# Patient Record
Sex: MALE | Race: WHITE | NOT HISPANIC OR LATINO | URBAN - METROPOLITAN AREA
[De-identification: names, ages, dates, MRNs, and addresses within clinical notes are randomized per-mention and may not be internally consistent; named-entity substitution may affect disease eponyms.]

---

## 2019-12-23 PROBLEM — Z00.00 ENCOUNTER FOR PREVENTIVE HEALTH EXAMINATION: Status: ACTIVE | Noted: 2019-12-23

## 2021-08-31 ENCOUNTER — OUTPATIENT (OUTPATIENT)
Dept: OUTPATIENT SERVICES | Facility: HOSPITAL | Age: 61
LOS: 1 days | Discharge: HOME | End: 2021-08-31
Payer: MEDICARE

## 2021-08-31 ENCOUNTER — RESULT REVIEW (OUTPATIENT)
Age: 61
End: 2021-08-31

## 2021-08-31 VITALS
DIASTOLIC BLOOD PRESSURE: 86 MMHG | HEART RATE: 75 BPM | RESPIRATION RATE: 16 BRPM | SYSTOLIC BLOOD PRESSURE: 123 MMHG | WEIGHT: 189.82 LBS | TEMPERATURE: 99 F | OXYGEN SATURATION: 97 % | HEIGHT: 70 IN

## 2021-08-31 DIAGNOSIS — M16.12 UNILATERAL PRIMARY OSTEOARTHRITIS, LEFT HIP: ICD-10-CM

## 2021-08-31 DIAGNOSIS — Z01.818 ENCOUNTER FOR OTHER PREPROCEDURAL EXAMINATION: ICD-10-CM

## 2021-08-31 DIAGNOSIS — Z98.890 OTHER SPECIFIED POSTPROCEDURAL STATES: Chronic | ICD-10-CM

## 2021-08-31 DIAGNOSIS — Z87.19 PERSONAL HISTORY OF OTHER DISEASES OF THE DIGESTIVE SYSTEM: Chronic | ICD-10-CM

## 2021-08-31 LAB
A1C WITH ESTIMATED AVERAGE GLUCOSE RESULT: 5.7 % — HIGH (ref 4–5.6)
ALBUMIN SERPL ELPH-MCNC: 4.4 G/DL — SIGNIFICANT CHANGE UP (ref 3.5–5.2)
ALP SERPL-CCNC: 86 U/L — SIGNIFICANT CHANGE UP (ref 30–115)
ALT FLD-CCNC: 26 U/L — SIGNIFICANT CHANGE UP (ref 0–41)
ANION GAP SERPL CALC-SCNC: 10 MMOL/L — SIGNIFICANT CHANGE UP (ref 7–14)
APTT BLD: 33.5 SEC — SIGNIFICANT CHANGE UP (ref 27–39.2)
AST SERPL-CCNC: 18 U/L — SIGNIFICANT CHANGE UP (ref 0–41)
BASOPHILS # BLD AUTO: 0.05 K/UL — SIGNIFICANT CHANGE UP (ref 0–0.2)
BASOPHILS NFR BLD AUTO: 1 % — SIGNIFICANT CHANGE UP (ref 0–1)
BILIRUB SERPL-MCNC: 0.7 MG/DL — SIGNIFICANT CHANGE UP (ref 0.2–1.2)
BLD GP AB SCN SERPL QL: SIGNIFICANT CHANGE UP
BUN SERPL-MCNC: 12 MG/DL — SIGNIFICANT CHANGE UP (ref 10–20)
CALCIUM SERPL-MCNC: 9.3 MG/DL — SIGNIFICANT CHANGE UP (ref 8.5–10.1)
CHLORIDE SERPL-SCNC: 106 MMOL/L — SIGNIFICANT CHANGE UP (ref 98–110)
CO2 SERPL-SCNC: 25 MMOL/L — SIGNIFICANT CHANGE UP (ref 17–32)
CREAT SERPL-MCNC: 0.9 MG/DL — SIGNIFICANT CHANGE UP (ref 0.7–1.5)
EOSINOPHIL # BLD AUTO: 0.25 K/UL — SIGNIFICANT CHANGE UP (ref 0–0.7)
EOSINOPHIL NFR BLD AUTO: 5 % — SIGNIFICANT CHANGE UP (ref 0–8)
ESTIMATED AVERAGE GLUCOSE: 117 MG/DL — HIGH (ref 68–114)
GLUCOSE SERPL-MCNC: 103 MG/DL — HIGH (ref 70–99)
HCT VFR BLD CALC: 45.5 % — SIGNIFICANT CHANGE UP (ref 42–52)
HGB BLD-MCNC: 15.6 G/DL — SIGNIFICANT CHANGE UP (ref 14–18)
IMM GRANULOCYTES NFR BLD AUTO: 0.2 % — SIGNIFICANT CHANGE UP (ref 0.1–0.3)
INR BLD: 0.96 RATIO — SIGNIFICANT CHANGE UP (ref 0.65–1.3)
LYMPHOCYTES # BLD AUTO: 1.81 K/UL — SIGNIFICANT CHANGE UP (ref 1.2–3.4)
LYMPHOCYTES # BLD AUTO: 35.8 % — SIGNIFICANT CHANGE UP (ref 20.5–51.1)
MCHC RBC-ENTMCNC: 32.2 PG — HIGH (ref 27–31)
MCHC RBC-ENTMCNC: 34.3 G/DL — SIGNIFICANT CHANGE UP (ref 32–37)
MCV RBC AUTO: 93.8 FL — SIGNIFICANT CHANGE UP (ref 80–94)
MONOCYTES # BLD AUTO: 0.61 K/UL — HIGH (ref 0.1–0.6)
MONOCYTES NFR BLD AUTO: 12.1 % — HIGH (ref 1.7–9.3)
MRSA PCR RESULT.: NEGATIVE — SIGNIFICANT CHANGE UP
NEUTROPHILS # BLD AUTO: 2.32 K/UL — SIGNIFICANT CHANGE UP (ref 1.4–6.5)
NEUTROPHILS NFR BLD AUTO: 45.9 % — SIGNIFICANT CHANGE UP (ref 42.2–75.2)
NRBC # BLD: 0 /100 WBCS — SIGNIFICANT CHANGE UP (ref 0–0)
PLATELET # BLD AUTO: 266 K/UL — SIGNIFICANT CHANGE UP (ref 130–400)
POTASSIUM SERPL-MCNC: 4.1 MMOL/L — SIGNIFICANT CHANGE UP (ref 3.5–5)
POTASSIUM SERPL-SCNC: 4.1 MMOL/L — SIGNIFICANT CHANGE UP (ref 3.5–5)
PROT SERPL-MCNC: 6.9 G/DL — SIGNIFICANT CHANGE UP (ref 6–8)
PROTHROM AB SERPL-ACNC: 11.1 SEC — SIGNIFICANT CHANGE UP (ref 9.95–12.87)
RBC # BLD: 4.85 M/UL — SIGNIFICANT CHANGE UP (ref 4.7–6.1)
RBC # FLD: 12.8 % — SIGNIFICANT CHANGE UP (ref 11.5–14.5)
SODIUM SERPL-SCNC: 141 MMOL/L — SIGNIFICANT CHANGE UP (ref 135–146)
WBC # BLD: 5.05 K/UL — SIGNIFICANT CHANGE UP (ref 4.8–10.8)
WBC # FLD AUTO: 5.05 K/UL — SIGNIFICANT CHANGE UP (ref 4.8–10.8)

## 2021-08-31 PROCEDURE — 71046 X-RAY EXAM CHEST 2 VIEWS: CPT | Mod: 26

## 2021-08-31 PROCEDURE — 93010 ELECTROCARDIOGRAM REPORT: CPT

## 2021-08-31 PROCEDURE — 73502 X-RAY EXAM HIP UNI 2-3 VIEWS: CPT | Mod: 26,LT

## 2021-08-31 NOTE — H&P PST ADULT - HISTORY OF PRESENT ILLNESS
PT PRESENTS TO PAST WITH NO SOB, CP, PALPITATIONS, DYSURIA, UTI OR URI AT PRESENT.   PT ABLE TO WALK UP 2-3 FLIGHTS OF STEPS WITH NO SOB.  AS PER THE PT, THIS IS HIS/HER COMPLETE MEDICAL AND SURGICAL HX, INCLUDING MEDICATIONS PRESCRIBED AND OVER THE COUNTER  pt denies any covid s/s, or tested positive in the past- PT AWARE OF COVID TEST ON 9/12 PRIOR TO PROCEDURE.   pt advised self quarantine till day of procedure  PT TRAVELED TO [place] the [place] in the past 30 days  Anesthesia Alert  NO--Difficult Airway  NO--History of neck surgery or radiation  NO--Limited ROM of neck  NO--History of Malignant hyperthermia  NO--Personal or family history of Pseudocholinesterase deficiency  NO--Prior Anesthesia Complication  NO--Latex Allergy  NO--Loose teeth  NO--History of Rheumatoid Arthritis  NO--STEFANY  NO BLEEDING RISK  NO--Other_____

## 2021-08-31 NOTE — H&P PST ADULT - REASON FOR ADMISSION
Patient is a   61   year old   male presenting to PAST in preparation for  left - thr   on    9/15/21  under    regional   anesthesia by Dr. CARVALHO .  pr reports- I have oa in my left hip FOR SEVERAL YEARS.  THE PAIN HAS INCREASED A LOT OVER THE LAST FEW MONTHS.  THE PAIN IS A 9 OUT OF 10.  ITS A BURNING, SHARP AND THROBBING PAIN.  PT POINTS TO HIS LEFT HIP AND AROUND HIS BACK AS HE DESCRIBES WHERE THE PAIN TRAVELS.   NOTHING REALLY TAKES THE PAIN AWAY.

## 2021-08-31 NOTE — H&P PST ADULT - NSICDXPASTSURGICALHX_GEN_ALL_CORE_FT
PAST SURGICAL HISTORY:  H/O hemorrhoids     H/O hernia repair     History of open reduction and internal fixation (ORIF) procedure RIGHT LEG

## 2021-08-31 NOTE — H&P PST ADULT - NSICDXPASTMEDICALHX_GEN_ALL_CORE_FT
PAST MEDICAL HISTORY:  Backache     Depression PT DENIES SUICIDAL IDEATION    HTN (hypertension)     Hypercholesterolemia     OA (osteoarthritis)

## 2021-09-14 ENCOUNTER — FORM ENCOUNTER (OUTPATIENT)
Age: 61
End: 2021-09-14

## 2021-09-15 ENCOUNTER — NON-APPOINTMENT (OUTPATIENT)
Age: 61
End: 2021-09-15

## 2021-09-15 PROBLEM — M54.9 DORSALGIA, UNSPECIFIED: Chronic | Status: ACTIVE | Noted: 2021-08-31

## 2021-09-15 PROBLEM — F32.9 MAJOR DEPRESSIVE DISORDER, SINGLE EPISODE, UNSPECIFIED: Chronic | Status: ACTIVE | Noted: 2021-08-31

## 2021-09-15 PROBLEM — I10 ESSENTIAL (PRIMARY) HYPERTENSION: Chronic | Status: ACTIVE | Noted: 2021-08-31

## 2021-09-15 PROBLEM — M19.90 UNSPECIFIED OSTEOARTHRITIS, UNSPECIFIED SITE: Chronic | Status: ACTIVE | Noted: 2021-08-31

## 2021-09-15 PROBLEM — E78.00 PURE HYPERCHOLESTEROLEMIA, UNSPECIFIED: Chronic | Status: ACTIVE | Noted: 2021-08-31

## 2021-11-03 ENCOUNTER — APPOINTMENT (OUTPATIENT)
Dept: ORTHOPEDIC SURGERY | Facility: CLINIC | Age: 61
End: 2021-11-03
Payer: MEDICARE

## 2021-11-03 ENCOUNTER — NON-APPOINTMENT (OUTPATIENT)
Age: 61
End: 2021-11-03

## 2021-11-03 VITALS — TEMPERATURE: 97.9 F

## 2021-11-03 DIAGNOSIS — Z86.79 PERSONAL HISTORY OF OTHER DISEASES OF THE CIRCULATORY SYSTEM: ICD-10-CM

## 2021-11-03 DIAGNOSIS — Z86.59 PERSONAL HISTORY OF OTHER MENTAL AND BEHAVIORAL DISORDERS: ICD-10-CM

## 2021-11-03 PROCEDURE — 99204 OFFICE O/P NEW MOD 45 MIN: CPT

## 2021-11-03 RX ORDER — METOPROLOL SUCCINATE 100 MG/1
100 TABLET, EXTENDED RELEASE ORAL
Refills: 0 | Status: ACTIVE | COMMUNITY

## 2021-11-03 RX ORDER — IBUPROFEN 800 MG/1
800 TABLET, FILM COATED ORAL
Refills: 0 | Status: ACTIVE | COMMUNITY

## 2021-11-03 RX ORDER — LORAZEPAM 0.5 MG/1
0.5 TABLET ORAL
Refills: 0 | Status: ACTIVE | COMMUNITY

## 2021-11-03 RX ORDER — DILTIAZEM HYDROCHLORIDE 120 MG/1
120 TABLET ORAL
Refills: 0 | Status: ACTIVE | COMMUNITY

## 2021-11-03 RX ORDER — GABAPENTIN 300 MG
300 TABLET ORAL
Refills: 0 | Status: ACTIVE | COMMUNITY

## 2021-11-03 RX ORDER — ZOLPIDEM TARTRATE 10 MG/1
10 TABLET ORAL
Refills: 0 | Status: ACTIVE | COMMUNITY

## 2021-11-03 RX ORDER — VENLAFAXINE HYDROCHLORIDE 37.5 MG/1
37.5 CAPSULE, EXTENDED RELEASE ORAL
Refills: 0 | Status: ACTIVE | COMMUNITY

## 2021-11-03 RX ORDER — LORAZEPAM 2 MG/1
2 TABLET ORAL
Refills: 0 | Status: ACTIVE | COMMUNITY

## 2021-11-03 RX ORDER — MELOXICAM 15 MG/1
15 TABLET ORAL DAILY
Qty: 30 | Refills: 0 | Status: COMPLETED | COMMUNITY
Start: 2021-11-03 | End: 2021-12-03

## 2021-11-19 ENCOUNTER — APPOINTMENT (OUTPATIENT)
Dept: ORTHOPEDIC SURGERY | Facility: CLINIC | Age: 61
End: 2021-11-19
Payer: MEDICARE

## 2021-11-19 DIAGNOSIS — Q65.89 OTHER SPECIFIED CONGENITAL DEFORMITIES OF HIP: ICD-10-CM

## 2021-11-19 PROCEDURE — 20610 DRAIN/INJ JOINT/BURSA W/O US: CPT | Mod: LT

## 2021-11-19 RX ORDER — TRIAMCINOLONE ACETONIDE 40 MG/ML
40 SUSPENSION INTRA-ARTERIAL; INTRAMUSCULAR
Qty: 1 | Refills: 0 | Status: COMPLETED | OUTPATIENT
Start: 2021-11-19

## 2021-11-19 RX ADMIN — TRIAMCINOLONE ACETONIDE 1 MG/ML: 40 INJECTION, SUSPENSION INTRA-ARTICULAR; INTRAMUSCULAR at 00:00

## 2021-11-19 NOTE — PHYSICAL EXAM
[de-identified] : Left hip\par Inspection: No swelling or ecchymosis.\par Wounds: none.\par Palpation: tender over the greater trochanter \par Stability: no instability.\par Strength: 5/5 all motor groups.\par ROM: Flexion to 80, ER 15, IR 10 with complaints of lateral hip pain, no groin pain. Not tender to palpation over greater trochanter. ABD 30\par Leg length: equal.\par  [de-identified] : Radiographs done outside 08/2021 at [facility] AP pelvis and lateral of hips shows dysplasia of the left hip with well maintained joint spaces.

## 2021-11-19 NOTE — ASSESSMENT
[FreeTextEntry1] : 11/3/2021 - 61 year old male presents to the office with complaints of left lateral hip pain. No complaints of groin pain. He has difficulty with ambulation, putting on shoes and socks and negotiating stairs. He takes ibuprofen PRN for pain. He has had PT without relief. His clinical picture is confusing. His radiographs do not show severe arthritis and his symptoms are limited to lateral hip. We will start with treating him with Mobic. Depending on his clinical pic at that time we might send him for a diagnostic hip injections. \par \par 11/19/2021 - Pt presents for a follow up after taking a course of Mobic. He did get moderate pain relief. He did admit that he does get occasional groin pain which is primarily negotiating stairs. But the majority of his pain is lateral. I reexamined him and ranging left hip only created lateral hip pain. He was also tender to palpation over posterior lateral trochanter. He has already tried PT without relief. We therefore injected his painful trochanteric bursa with lidocaine and kenalog. He will keep a log over the  next few hours about how he feels, continue to Mobic and f/u in 2-3 weeks. \par \par Discussed at length with the patient the planned steroid and lidocaine injection. The risks, benefits, convalescence and alternatives were reviewed. The possible side effects discussed included but were not limited to: pain, swelling, heat and redness. There symptoms are generally mild but if they are extensive then contact the office. Giving pain relievers by mouth such as NSAID’s or Tylenol can generally treat the reactions to steroid and lidocaine. Rare cases of infection have been noted. Rash, hives and itching may occur post injection. If you have muscle pain or cramps, flushing and or swelling of the face, rapid heart beat, nausea, dizziness, fever, chills, headache, difficulty breathing, swelling in the arms or legs, or have a prickly feeling of your skin, contact a health care provider immediately.\par \par Following this discussion, left lateral thigh was prepped with Betadine and under sterile conditions 4 cc of 1% lidocaine and 1 ml Kenalog were injected with a 21 gauge needle. The needle was introduced into the joint, aspiration was performed to ensure intra-articular placement and the medication was injected. Upon withdrawal of the needle the site was cleaned with alcohol and a Band-Aid applied. The patient tolerated the injection well and there were no adverse effects. Post injection instructions included no strenuous activity for 24 hours, cryotherapy and if there are any adverse effects to contact the office.

## 2021-11-19 NOTE — ASSESSMENT
[FreeTextEntry1] : 61 year old male presents to the office with complaints of left lateral hip pain. No complaints of groin pain. He has difficulty with ambulation, putting on shoes and socks and negotiating stairs. He takes ibuprofen PRN for pain. He has had PT without relief. His clinical picture is confusing. His radiographs do not show severe arthritis and his symptoms are limited to lateral hip. We will start with treating him with Mobic. Depending on his clinical pic at that time we might send him for a diagnostic hip injections.

## 2021-11-19 NOTE — HISTORY OF PRESENT ILLNESS
[de-identified] : 11/3/2021 - 61 year old male presents to the office today complaining of left hip pain for more than one year that is worsening. Patient reports pain that is sharp in nature. He states he was scheduled for a total hip replacement with another orthopedist but was canceled due to not getting his COVID test. He states this was the fault of the office and he knew nothing about the test. Patient reports clicking and a loss of motion. He uses a cane for long walks but states he is mostly only able to ambulate about one block at a time. Patient reports pain and difficulty with negotiating stairs. There is also difficulty with putting socks and shoes on. Patient reports taking Ibuprofen 800 mg leftover from a previous femur fracture repair and taking Gabapentin given by his PCP with only minimal relief. Patient has done physical therapy for his left hip in the past without any relief. Patient is here today to discuss his next options for pain relief. \par \par 11/19/2021 - 61 year old male presents to the office today for a follow up on his painful left hip. Patient was started on Meloxicam when we last saw him but reports only mild relief. He continues to have pain located mostly at the lateral hip and in the groin. Patient continues to have to ambulate using a cane for long walks. Patient is here today to discuss his next options for pain relief.

## 2021-11-19 NOTE — HISTORY OF PRESENT ILLNESS
[de-identified] : 61 year old male presents to the office today complaining of left hip pain for more than one year that is worsening. Patient reports pain that is sharp in nature. He states he was scheduled for a total hip replacement with another orthopedist but was canceled due to not getting his COVID test. He states this was the fault of the office and he knew nothing about the test. Patient reports clicking and a loss of motion. He uses a cane for long walks but states he is mostly only able to ambulate about one block at a time. Patient reports pain and difficulty with negotiating stairs. There is also difficulty with putting socks and shoes on. Patient reports taking Ibuprofen 800 mg leftover from a previous femur fracture repair and taking Gabapentin given by his PCP with only minimal relief. Patient has done physical therapy for his left hip in the past without any relief. Patient is here today to discuss his next options for pain relief.

## 2021-11-19 NOTE — PHYSICAL EXAM
[de-identified] : General appearance: well nourished and hydrated, pleasant, alert and oriented x 3, cooperative.\par Cardiovascular: no apparent abnormalities, no lower leg edema, no varicosities, pedal pulses are palpable.\par Neurologic: sensation is normal, no muscle weakness in upper or lower extremities\par Dermatologic no apparent skin lesions, moist, warm, no rash.\par Gait: nonantalgic.\par \par Left knee\par Inspection: no effusion or erythema.\par Wounds: none.\par Alignment: normal.\par Palpation: no specific tenderness on palpation.\par ROM: 0-120 degrees \par Ligamentous laxity: all ligaments appear stable\par Meniscal Test: negative McMurrays\par Muscle Test: good quad strength.\par \par Right knee\par Inspection: no effusion or erythema.\par Wounds: none.\par Alignment: normal.\par Palpation: no specific tenderness on palpation.\par ROM: 0-120 degrees \par Ligamentous laxity: all ligaments appear stable\par Meniscal Test: negative McMurrays.\par Muscle Test: good quad strength.\par \par Left hip\par Inspection: No swelling or ecchymosis.\par Wounds: none.\par Palpation: non-tender.\par Stability: no instability.\par Strength: 5/5 all motor groups.\par ROM: Flexion to 80, ER 15, IR 10 with complaints of lateral hip pain, no groin pain. Not tender to palpation over greater trochanter. ABD 30\par Leg length: equal.\par \par Right hip\par Inspection: No swelling or ecchymosis.\par Wounds: none.\par Palpation: non-tender.\par Stability: no instability.\par Strength: 5/5 all motor groups.\par ROM: no pain with FROM.\par Leg length: equal. [de-identified] : Radiographs done outside AP pelvis and lateral of hips shows dysplasia of the left hip with well maintained joint spaces.

## 2021-11-30 ENCOUNTER — RX RENEWAL (OUTPATIENT)
Age: 61
End: 2021-11-30

## 2021-12-10 ENCOUNTER — APPOINTMENT (OUTPATIENT)
Dept: ORTHOPEDIC SURGERY | Facility: CLINIC | Age: 61
End: 2021-12-10
Payer: MEDICARE

## 2021-12-10 DIAGNOSIS — M25.552 PAIN IN LEFT HIP: ICD-10-CM

## 2021-12-10 PROCEDURE — 99213 OFFICE O/P EST LOW 20 MIN: CPT

## 2021-12-10 NOTE — ASSESSMENT
[FreeTextEntry1] : 11/3/2021 - 61 year old male presents to the office with complaints of left lateral hip pain. No complaints of groin pain. He has difficulty with ambulation, putting on shoes and socks and negotiating stairs. He takes ibuprofen PRN for pain. He has had PT without relief. His clinical picture is confusing. His radiographs do not show severe arthritis and his symptoms are limited to lateral hip. We will start with treating him with Mobic. Depending on his clinical pic at that time we might send him for a diagnostic hip injections. \par \par 11/19/2021 - Pt presents for a follow up after taking a course of Mobic. He did get moderate pain relief. He did admit that he does get occasional groin pain which is primarily negotiating stairs. But the majority of his pain is lateral. I reexamined him and ranging left hip only created lateral hip pain. He was also tender to palpation over posterior lateral trochanter. He has already tried PT without relief. We therefore injected his painful trochanteric bursa with lidocaine and kenalog. He will keep a log over the  next few hours about how he feels, continue to Mobic and f/u in 2-3 weeks. \par \par 12/10/2021- Pt presents for a follow up after having a Kenalog and lidocaine injection into the left bursa on the last visit. He has also remained on Mobic. He states that the pain now is a 3 or 4. He attributes him improvement to the Mobic and not to the injection. I did reexamine his hip. He has a reasonable ROM with no groin pain. But he is still very tender over the trochanter. I think at this point he would benefit from PT. This was prescribed. He will remain on Mobic under PCP and f/u in 2 months time.

## 2021-12-10 NOTE — PHYSICAL EXAM
[de-identified] : Left hip\par Inspection: No swelling or ecchymosis.\par Wounds: none.\par Palpation: tender over the greater trochanter \par Stability: no instability.\par Strength: 5/5 all motor groups.\par ROM: Flexion to 80, ER 15, IR 10 with complaints of lateral hip pain, no groin pain. Not tender to palpation over greater trochanter. ABD 30\par Leg length: equal.\par  [de-identified] : Radiographs done outside 08/2021 at [facility] AP pelvis and lateral of hips shows dysplasia of the left hip with well maintained joint spaces.

## 2021-12-10 NOTE — HISTORY OF PRESENT ILLNESS
[de-identified] : 11/3/2021 - 61 year old male presents to the office today complaining of left hip pain for more than one year that is worsening. Patient reports pain that is sharp in nature. He states he was scheduled for a total hip replacement with another orthopedist but was canceled due to not getting his COVID test. He states this was the fault of the office and he knew nothing about the test. Patient reports clicking and a loss of motion. He uses a cane for long walks but states he is mostly only able to ambulate about one block at a time. Patient reports pain and difficulty with negotiating stairs. There is also difficulty with putting socks and shoes on. Patient reports taking Ibuprofen 800 mg leftover from a previous femur fracture repair and taking Gabapentin given by his PCP with only minimal relief. Patient has done physical therapy for his left hip in the past without any relief. Patient is here today to discuss his next options for pain relief. \par \par 11/19/2021 - 61 year old male presents to the office today for a follow up on his painful left hip. Patient was started on Meloxicam when we last saw him but reports only mild relief. He continues to have pain located mostly at the lateral hip and in the groin. Patient continues to have to ambulate using a cane for long walks. Patient is here today to discuss his next options for pain relief. \par \par 12/10/2021 - 61 year old male presents to the office today for a follow up on his painful left hip. When we last saw the patient, he received a cortisone injection into the bursa of the left hip. He denies any relief with this and states he did not feel a difference. Patient continues to take Meloxicam but reports only having mild relief with it. Patient is here today to discuss his next options for pain relief.

## 2021-12-30 ENCOUNTER — EMERGENCY (EMERGENCY)
Facility: HOSPITAL | Age: 61
LOS: 0 days | Discharge: HOME | End: 2021-12-30
Attending: EMERGENCY MEDICINE | Admitting: EMERGENCY MEDICINE
Payer: MEDICARE

## 2021-12-30 VITALS
RESPIRATION RATE: 17 BRPM | HEIGHT: 70 IN | SYSTOLIC BLOOD PRESSURE: 129 MMHG | HEART RATE: 102 BPM | WEIGHT: 184.97 LBS | TEMPERATURE: 97 F | DIASTOLIC BLOOD PRESSURE: 69 MMHG | OXYGEN SATURATION: 100 %

## 2021-12-30 DIAGNOSIS — Z98.890 OTHER SPECIFIED POSTPROCEDURAL STATES: Chronic | ICD-10-CM

## 2021-12-30 DIAGNOSIS — N20.0 CALCULUS OF KIDNEY: ICD-10-CM

## 2021-12-30 DIAGNOSIS — E78.00 PURE HYPERCHOLESTEROLEMIA, UNSPECIFIED: ICD-10-CM

## 2021-12-30 DIAGNOSIS — R91.1 SOLITARY PULMONARY NODULE: ICD-10-CM

## 2021-12-30 DIAGNOSIS — Z87.19 PERSONAL HISTORY OF OTHER DISEASES OF THE DIGESTIVE SYSTEM: Chronic | ICD-10-CM

## 2021-12-30 DIAGNOSIS — R10.9 UNSPECIFIED ABDOMINAL PAIN: ICD-10-CM

## 2021-12-30 DIAGNOSIS — E78.5 HYPERLIPIDEMIA, UNSPECIFIED: ICD-10-CM

## 2021-12-30 DIAGNOSIS — I10 ESSENTIAL (PRIMARY) HYPERTENSION: ICD-10-CM

## 2021-12-30 LAB
ALBUMIN SERPL ELPH-MCNC: 4.5 G/DL — SIGNIFICANT CHANGE UP (ref 3.5–5.2)
ALP SERPL-CCNC: 66 U/L — SIGNIFICANT CHANGE UP (ref 30–115)
ALT FLD-CCNC: 22 U/L — SIGNIFICANT CHANGE UP (ref 0–41)
ANION GAP SERPL CALC-SCNC: 16 MMOL/L — HIGH (ref 7–14)
APPEARANCE UR: CLEAR — SIGNIFICANT CHANGE UP
AST SERPL-CCNC: 21 U/L — SIGNIFICANT CHANGE UP (ref 0–41)
BASOPHILS # BLD AUTO: 0.05 K/UL — SIGNIFICANT CHANGE UP (ref 0–0.2)
BASOPHILS NFR BLD AUTO: 0.6 % — SIGNIFICANT CHANGE UP (ref 0–1)
BILIRUB DIRECT SERPL-MCNC: <0.2 MG/DL — SIGNIFICANT CHANGE UP (ref 0–0.3)
BILIRUB INDIRECT FLD-MCNC: >0.3 MG/DL — SIGNIFICANT CHANGE UP (ref 0.2–1.2)
BILIRUB SERPL-MCNC: 0.5 MG/DL — SIGNIFICANT CHANGE UP (ref 0.2–1.2)
BILIRUB UR-MCNC: NEGATIVE — SIGNIFICANT CHANGE UP
BUN SERPL-MCNC: 22 MG/DL — HIGH (ref 10–20)
CALCIUM SERPL-MCNC: 9.5 MG/DL — SIGNIFICANT CHANGE UP (ref 8.5–10.1)
CHLORIDE SERPL-SCNC: 103 MMOL/L — SIGNIFICANT CHANGE UP (ref 98–110)
CO2 SERPL-SCNC: 19 MMOL/L — SIGNIFICANT CHANGE UP (ref 17–32)
COLOR SPEC: YELLOW — SIGNIFICANT CHANGE UP
CREAT SERPL-MCNC: 1.2 MG/DL — SIGNIFICANT CHANGE UP (ref 0.7–1.5)
DIFF PNL FLD: SIGNIFICANT CHANGE UP
EOSINOPHIL # BLD AUTO: 0.02 K/UL — SIGNIFICANT CHANGE UP (ref 0–0.7)
EOSINOPHIL NFR BLD AUTO: 0.2 % — SIGNIFICANT CHANGE UP (ref 0–8)
GLUCOSE SERPL-MCNC: 108 MG/DL — HIGH (ref 70–99)
GLUCOSE UR QL: NEGATIVE — SIGNIFICANT CHANGE UP
HCT VFR BLD CALC: 43.1 % — SIGNIFICANT CHANGE UP (ref 42–52)
HGB BLD-MCNC: 14.7 G/DL — SIGNIFICANT CHANGE UP (ref 14–18)
IMM GRANULOCYTES NFR BLD AUTO: 0.3 % — SIGNIFICANT CHANGE UP (ref 0.1–0.3)
KETONES UR-MCNC: NEGATIVE — SIGNIFICANT CHANGE UP
LACTATE SERPL-SCNC: 1.1 MMOL/L — SIGNIFICANT CHANGE UP (ref 0.7–2)
LEUKOCYTE ESTERASE UR-ACNC: NEGATIVE — SIGNIFICANT CHANGE UP
LIDOCAIN IGE QN: 18 U/L — SIGNIFICANT CHANGE UP (ref 7–60)
LYMPHOCYTES # BLD AUTO: 1.08 K/UL — LOW (ref 1.2–3.4)
LYMPHOCYTES # BLD AUTO: 12.2 % — LOW (ref 20.5–51.1)
MCHC RBC-ENTMCNC: 32 PG — HIGH (ref 27–31)
MCHC RBC-ENTMCNC: 34.1 G/DL — SIGNIFICANT CHANGE UP (ref 32–37)
MCV RBC AUTO: 93.7 FL — SIGNIFICANT CHANGE UP (ref 80–94)
MONOCYTES # BLD AUTO: 0.95 K/UL — HIGH (ref 0.1–0.6)
MONOCYTES NFR BLD AUTO: 10.7 % — HIGH (ref 1.7–9.3)
NEUTROPHILS # BLD AUTO: 6.71 K/UL — HIGH (ref 1.4–6.5)
NEUTROPHILS NFR BLD AUTO: 76 % — HIGH (ref 42.2–75.2)
NITRITE UR-MCNC: NEGATIVE — SIGNIFICANT CHANGE UP
NRBC # BLD: 0 /100 WBCS — SIGNIFICANT CHANGE UP (ref 0–0)
PH UR: 6 — SIGNIFICANT CHANGE UP (ref 5–8)
PLATELET # BLD AUTO: 283 K/UL — SIGNIFICANT CHANGE UP (ref 130–400)
POTASSIUM SERPL-MCNC: 4.4 MMOL/L — SIGNIFICANT CHANGE UP (ref 3.5–5)
POTASSIUM SERPL-SCNC: 4.4 MMOL/L — SIGNIFICANT CHANGE UP (ref 3.5–5)
PROT SERPL-MCNC: 7 G/DL — SIGNIFICANT CHANGE UP (ref 6–8)
PROT UR-MCNC: SIGNIFICANT CHANGE UP
RBC # BLD: 4.6 M/UL — LOW (ref 4.7–6.1)
RBC # FLD: 12.7 % — SIGNIFICANT CHANGE UP (ref 11.5–14.5)
SODIUM SERPL-SCNC: 138 MMOL/L — SIGNIFICANT CHANGE UP (ref 135–146)
SP GR SPEC: 1.02 — SIGNIFICANT CHANGE UP (ref 1.01–1.03)
UROBILINOGEN FLD QL: SIGNIFICANT CHANGE UP
WBC # BLD: 8.84 K/UL — SIGNIFICANT CHANGE UP (ref 4.8–10.8)
WBC # FLD AUTO: 8.84 K/UL — SIGNIFICANT CHANGE UP (ref 4.8–10.8)

## 2021-12-30 PROCEDURE — 99285 EMERGENCY DEPT VISIT HI MDM: CPT

## 2021-12-30 PROCEDURE — 93010 ELECTROCARDIOGRAM REPORT: CPT

## 2021-12-30 PROCEDURE — 74177 CT ABD & PELVIS W/CONTRAST: CPT | Mod: 26,MA

## 2021-12-30 PROCEDURE — 71045 X-RAY EXAM CHEST 1 VIEW: CPT | Mod: 26

## 2021-12-30 RX ORDER — TAMSULOSIN HYDROCHLORIDE 0.4 MG/1
0.4 CAPSULE ORAL AT BEDTIME
Refills: 0 | Status: DISCONTINUED | OUTPATIENT
Start: 2021-12-30 | End: 2021-12-30

## 2021-12-30 RX ORDER — KETOROLAC TROMETHAMINE 30 MG/ML
1 SYRINGE (ML) INJECTION
Qty: 20 | Refills: 0
Start: 2021-12-30 | End: 2022-01-03

## 2021-12-30 RX ORDER — SODIUM CHLORIDE 9 MG/ML
1000 INJECTION, SOLUTION INTRAVENOUS ONCE
Refills: 0 | Status: COMPLETED | OUTPATIENT
Start: 2021-12-30 | End: 2021-12-30

## 2021-12-30 RX ORDER — TAMSULOSIN HYDROCHLORIDE 0.4 MG/1
1 CAPSULE ORAL
Qty: 5 | Refills: 0
Start: 2021-12-30 | End: 2022-01-03

## 2021-12-30 RX ORDER — KETOROLAC TROMETHAMINE 30 MG/ML
1 SYRINGE (ML) INJECTION
Qty: 12 | Refills: 0
Start: 2021-12-30 | End: 2022-01-01

## 2021-12-30 RX ORDER — ASPIRIN/CALCIUM CARB/MAGNESIUM 324 MG
162 TABLET ORAL ONCE
Refills: 0 | Status: DISCONTINUED | OUTPATIENT
Start: 2021-12-30 | End: 2021-12-30

## 2021-12-30 RX ADMIN — SODIUM CHLORIDE 1000 MILLILITER(S): 9 INJECTION, SOLUTION INTRAVENOUS at 15:53

## 2021-12-30 RX ADMIN — SODIUM CHLORIDE 1000 MILLILITER(S): 9 INJECTION, SOLUTION INTRAVENOUS at 17:15

## 2021-12-30 NOTE — ED PROVIDER NOTE - OBJECTIVE STATEMENT
61yM pmhx HTN, HLD, OA, "arrhythmia," kidney stones c/o LT flank pain x2days; sharp, waxing and waning, radiating to LT groin; associated w/ sweats; pain relieved by tramadol; denies fever, dysuria, hematuria, chest pain, SOB.

## 2021-12-30 NOTE — ED PROVIDER NOTE - CARE PLAN
Assessment and plan of treatment:	Plan: EKG, CXR, labs, ivf, urine, imaging, reassess.   Principal Discharge DX:	Kidney stones  Assessment and plan of treatment:	Plan: EKG, CXR, labs, ivf, urine, imaging, reassess.  Secondary Diagnosis:	Pulmonary nodule   1

## 2021-12-30 NOTE — ED PROVIDER NOTE - PATIENT PORTAL LINK FT
You can access the FollowMyHealth Patient Portal offered by [facility] by registering at the following website: [email or web address]. By joining [facility]’s FollowMyHealth portal, you will also be able to view your health information using other applications (apps) compatible with our system.

## 2021-12-30 NOTE — ED PROVIDER NOTE - ATTENDING CONTRIBUTION TO CARE
62 y/o m w/ pmhx of anxiety, depressions, htn, hld, ? arrythmia, kidney stones, last was few years ago, follows [facility] Dr. Rosa ,presents with ~ 3 days L sided flank pain, intermittent, sharp, moderate in intensity when it comes on, radiating to LLQ relieved with tramadol he has been taking that his daughter had, worse with movement, no trauma, no urinary or bowel incontinence, no saddle parerethisis. pt reports last took tramadol at 12:30 pm which helps. reports feels like his kidney stones. denies fever, chills, n/v, cp, sob, pleuritic chest pain, palpitations, diaphoresis, cough, diarrhea, constipation, melena/brbpr, urinary symptoms,  penile pain/discharge, testicular pain/swelling/erythema, rectal pain or tenesmus, a sick contacts, recent travel or rash.      on exam: WDWN appearing male sitting on stretcher in nad, no rash, mmm, regular rate, radial pulses 2/4 b/l, no jvd, ctabl w/ breath sounds present b/l, no wheezing or crackles, no accessory muscle use, no tachypnea, no stridor, bs present throughout all 4 quadrants, abd soft, nd, nt, no rebound tenderness or guarding, (+) L cvat, (-) Rovsing (-) Obturator (-) Psaos. (-) Mukherjee's,  FROM of ext, no edema, no calf pain/swelling/erythema, No spinous ttp, no palpable shelves or step - offs. AAOx3. No focal deficits. 62 y/o m w/ pmhx of anxiety, depressions, htn, hld, ? arrythmia (pvc's), kidney stones, last was few years ago, follows [facility] Dr. Rosa ,presents with ~ 3 days L sided flank pain, intermittent, sharp, moderate in intensity when it comes on, radiating to LLQ relieved with tramadol he has been taking that his daughter had, worse with movement, no trauma, no urinary or bowel incontinence, no saddle parerethisis. pt reports last took tramadol at 12:30 pm which helps. reports feels like his kidney stones. denies fever, chills, n/v, cp, sob, pleuritic chest pain, palpitations, diaphoresis, cough, diarrhea, constipation, melena/brbpr, urinary symptoms,  penile pain/discharge, testicular pain/swelling/erythema, rectal pain or tenesmus, a sick contacts, recent travel or rash.      on exam: WDWN appearing male sitting on stretcher in nad, no rash, mmm, regular rate, radial pulses 2/4 b/l, no jvd, ctabl w/ breath sounds present b/l, no wheezing or crackles, no accessory muscle use, no tachypnea, no stridor, bs present throughout all 4 quadrants, abd soft, nd, nt, no rebound tenderness or guarding, (+) L cvat, (-) Rovsing (-) Obturator (-) Psaos. (-) Mukherjee's,  FROM of ext, no edema, no calf pain/swelling/erythema, No spinous ttp, no palpable shelves or step - offs. AAOx3. No focal deficits.

## 2021-12-30 NOTE — ED PROVIDER NOTE - PHYSICAL EXAMINATION
Vital Signs: Reviewed  GEN: alert, NAD, speaks full sentences  HEAD:  normocephalic, atraumatic  EYES:  PERRLA; conjunctivae without injection, drainage or discharge  ENMT:  nasal mucosa moist; mouth moist without ulcerations or lesions; throat moist without erythema, exudate, ulcerations or lesions  NECK:  supple  CARDIAC:  regular rate, normal S1 and S2, no murmurs  RESP:  respiratory rate and effort appear normal for age; lungs are clear to auscultation bilaterally; no rales or wheezes  ABDOMEN: LLQ discomfort no guarding no rebound; soft, nondistended  : LT flank tenderness  MUSCULOSKELETAL/NEURO:  normal movement, normal tone  SKIN:  normal skin color for age and race, well-perfused; warm and dry

## 2021-12-30 NOTE — ED ADULT TRIAGE NOTE - CHIEF COMPLAINT QUOTE
patient complaining of left flank pain that started two days ago. patient denies any fevers or dysuria

## 2021-12-30 NOTE — ED PROVIDER NOTE - CARE PROVIDER_API CALL
Marquis Rosa)  Urology  Count includes the [facility] [address]  Phone: [phone]  Fax: [phone]  Follow Up Time:

## 2021-12-30 NOTE — ED ADULT NURSE NOTE - OBJECTIVE STATEMENT
Pt c/o left flank pain radiating to left groin x2 days. Pt endorses diaphoresis. Denies fever, chills, cough, dysuria, hematuria, chest pain, SOB. Pt has hx of kidney stones.

## 2021-12-30 NOTE — ED PROVIDER NOTE - CLINICAL SUMMARY MEDICAL DECISION MAKING FREE TEXT BOX
Patient is a good candidate to attempt outpatient management. Supportive care and home care discussed in detail. Patient aware they may have to return for re-evaluation  if outpatient treatment fails. Strict return precautions discussed.  Reports will follow up.

## 2021-12-30 NOTE — ED PROVIDER NOTE - PROGRESS NOTE DETAILS
AG: pt declined analgesia on initial evaluation Pt currently in no pain. Informed of all ct/ua/blood results. Says he has flomax at home. Says he is already aware of pulmonary nodule on CT. ED Attending SANDHYA Strickland  Pt aware of all results, family at bedside :"1. Mild left hydronephrosis with a 0.3 x 0.3 x 0.3 cm calculus at left   proximal ureter. Additional 0.1 x 0.1 x 0.1 cm calculus layering   proximally within distended left ureter.  2. Right lower lobe 0.8 cm groundglass pulmonary nodule. Per Fleischner   Society 2017 guidelines, follow-up CT chest in 6-12 months recommended." has urology to follow up with, understands proper hydration, signs and symptoms to return for, abd re exam soft ntnd, no r/g, no cvat, tolerating po, cre 1.2, copy of results provided, states he is aware of nodule, reports will follow up.

## 2021-12-31 LAB
CULTURE RESULTS: SIGNIFICANT CHANGE UP
SPECIMEN SOURCE: SIGNIFICANT CHANGE UP

## 2022-02-11 ENCOUNTER — APPOINTMENT (OUTPATIENT)
Dept: ORTHOPEDIC SURGERY | Facility: CLINIC | Age: 62
End: 2022-02-11
Payer: MEDICARE

## 2022-02-11 PROCEDURE — 99214 OFFICE O/P EST MOD 30 MIN: CPT | Mod: 95

## 2022-02-11 NOTE — PHYSICAL EXAM
[de-identified] : 12/10/2021- \par Left hip\par Inspection: No swelling or ecchymosis.\par Wounds: none.\par Palpation: tender over the greater trochanter \par Stability: no instability.\par Strength: 5/5 all motor groups.\par ROM: Flexion to 80, ER 15, IR 10 with complaints of lateral hip pain, no groin pain. Not tender to palpation over greater trochanter. ABD 30\par Leg length: equal.\par  [de-identified] : Radiographs done outside 08/2021 at [facility] AP pelvis and lateral of hips shows dysplasia of the left hip with well maintained joint spaces.

## 2022-02-11 NOTE — REASON FOR VISIT
[Home] : at home, [unfilled] , at the time of the visit. [Other Location: e.g. Home (Enter Location, City,State)___] : at [unfilled] [Verbal consent obtained from patient] : the patient, [unfilled] [FreeTextEntry4] : Rmoina Rucker MA [Follow-Up Visit] : a follow-up visit for [Hip Pain] : hip pain

## 2022-02-11 NOTE — HISTORY OF PRESENT ILLNESS
[de-identified] : 11/3/2021 - 61 year old male presents to the office today complaining of left hip pain for more than one year that is worsening. Patient reports pain that is sharp in nature. He states he was scheduled for a total hip replacement with another orthopedist but was canceled due to not getting his COVID test. He states this was the fault of the office and he knew nothing about the test. Patient reports clicking and a loss of motion. He uses a cane for long walks but states he is mostly only able to ambulate about one block at a time. Patient reports pain and difficulty with negotiating stairs. There is also difficulty with putting socks and shoes on. Patient reports taking Ibuprofen 800 mg leftover from a previous femur fracture repair and taking Gabapentin given by his PCP with only minimal relief. Patient has done physical therapy for his left hip in the past without any relief. Patient is here today to discuss his next options for pain relief. \par \par 11/19/2021 - 61 year old male presents to the office today for a follow up on his painful left hip. Patient was started on Meloxicam when we last saw him but reports only mild relief. He continues to have pain located mostly at the lateral hip and in the groin. Patient continues to have to ambulate using a cane for long walks. Patient is here today to discuss his next options for pain relief. \par \par 12/10/2021 - 61 year old male presents to the office today for a follow up on his painful left hip. When we last saw the patient, he received a cortisone injection into the bursa of the left hip. He denies any relief with this and states he did not feel a difference. Patient continues to take Meloxicam but reports only having mild relief with it. Patient is here today to discuss his next options for pain relief.

## 2022-02-11 NOTE — ASSESSMENT
[FreeTextEntry1] : 11/3/2021 - 61 year old male presents to the office with complaints of left lateral hip pain. No complaints of groin pain. He has difficulty with ambulation, putting on shoes and socks and negotiating stairs. He takes ibuprofen PRN for pain. He has had PT without relief. His clinical picture is confusing. His radiographs do not show severe arthritis and his symptoms are limited to lateral hip. We will start with treating him with Mobic. Depending on his clinical pic at that time we might send him for a diagnostic hip injections. \par \par 11/19/2021 - Pt presents for a follow up after taking a course of Mobic. He did get moderate pain relief. He did admit that he does get occasional groin pain which is primarily negotiating stairs. But the majority of his pain is lateral. I reexamined him and ranging left hip only created lateral hip pain. He was also tender to palpation over posterior lateral trochanter. He has already tried PT without relief. We therefore injected his painful trochanteric bursa with lidocaine and kenalog. He will keep a log over the  next few hours about how he feels, continue to Mobic and f/u in 2-3 weeks. \par \par 12/10/2021- Pt presents for a follow up after having a Kenalog and lidocaine injection into the left bursa on the last visit. He has also remained on Mobic. He states that the pain now is a 3 or 4. He attributes him improvement to the Mobic and not to the injection. I did reexamine his hip. He has a reasonable ROM with no groin pain. But he is still very tender over the trochanter. I think at this point he would benefit from PT. This was prescribed. He will remain on Mobic under PCP and f/u in 2 months time. \par \par 02/11/2022- Pt says his hip is a little better. He is still in PT. His knee has started to hurt him in the mean time. He will make an appt to come into the office for xray.

## 2022-02-18 ENCOUNTER — APPOINTMENT (OUTPATIENT)
Dept: ORTHOPEDIC SURGERY | Facility: CLINIC | Age: 62
End: 2022-02-18
Payer: MEDICARE

## 2022-02-18 DIAGNOSIS — M22.2X9 PATELLOFEMORAL DISORDERS, UNSPECIFIED KNEE: ICD-10-CM

## 2022-02-18 DIAGNOSIS — M17.11 UNILATERAL PRIMARY OSTEOARTHRITIS, RIGHT KNEE: ICD-10-CM

## 2022-02-18 PROCEDURE — 73564 X-RAY EXAM KNEE 4 OR MORE: CPT | Mod: RT

## 2022-02-18 PROCEDURE — 73502 X-RAY EXAM HIP UNI 2-3 VIEWS: CPT | Mod: LT

## 2022-02-18 PROCEDURE — 99214 OFFICE O/P EST MOD 30 MIN: CPT

## 2022-02-23 PROBLEM — M22.2X9 PATELLOFEMORAL MALTRACKING: Status: ACTIVE | Noted: 2022-02-23

## 2022-02-23 PROBLEM — M17.11 PATELLOFEMORAL ARTHRITIS OF RIGHT KNEE: Status: ACTIVE | Noted: 2022-02-23

## 2022-02-23 NOTE — HISTORY OF PRESENT ILLNESS
[de-identified] : 11/3/2021 - 61 year old male presents to the office today complaining of left hip pain for more than one year that is worsening. Patient reports pain that is sharp in nature. He states he was scheduled for a total hip replacement with another orthopedist but was canceled due to not getting his COVID test. He states this was the fault of the office and he knew nothing about the test. Patient reports clicking and a loss of motion. He uses a cane for long walks but states he is mostly only able to ambulate about one block at a time. Patient reports pain and difficulty with negotiating stairs. There is also difficulty with putting socks and shoes on. Patient reports taking Ibuprofen 800 mg leftover from a previous femur fracture repair and taking Gabapentin given by his PCP with only minimal relief. Patient has done physical therapy for his left hip in the past without any relief. Patient is here today to discuss his next options for pain relief. \par \par 11/19/2021 - 61 year old male presents to the office today for a follow up on his painful left hip. Patient was started on Meloxicam when we last saw him but reports only mild relief. He continues to have pain located mostly at the lateral hip and in the groin. Patient continues to have to ambulate using a cane for long walks. Patient is here today to discuss his next options for pain relief. \par \par 12/10/2021 - 61 year old male presents to the office today for a follow up on his painful left hip. When we last saw the patient, he received a cortisone injection into the bursa of the left hip. He denies any relief with this and states he did not feel a difference. Patient continues to take Meloxicam but reports only having mild relief with it. Patient is here today to discuss his next options for pain relief.

## 2022-02-23 NOTE — ADDENDUM
[FreeTextEntry1] : While the pt was here for his knee pain he also brought up the fact that his left hip has been giving him more pain. The majority of his pain is lateral with minimal groin pain. He does have moderate arthritis secondary to dysplasia. Since he is considering hip replacement Sx, I feel a intraarticular hip injection of Marcaine and Kenalog would both be diagnostic and therapeutic. The pt was agreeable for going for the injection.

## 2022-02-23 NOTE — PHYSICAL EXAM
[de-identified] : \par  [de-identified] : Radiographs done today  AP pelvis and lateral of hips shows dysplasia of the left hip with 50 percent narrowing superior laterally. \par \par Radiographs done today AP lateral and skyline of knees shows well maintained joint spaces on AP standing view, and the right knee can be seen the distal part of an interlocking lisa.

## 2022-02-23 NOTE — ASSESSMENT
[FreeTextEntry1] : 11/3/2021 - 61 year old male presents to the office with complaints of left lateral hip pain. No complaints of groin pain. He has difficulty with ambulation, putting on shoes and socks and negotiating stairs. He takes ibuprofen PRN for pain. He has had PT without relief. His clinical picture is confusing. His radiographs do not show severe arthritis and his symptoms are limited to lateral hip. We will start with treating him with Mobic. Depending on his clinical pic at that time we might send him for a diagnostic hip injections. \par \par 11/19/2021 - Pt presents for a follow up after taking a course of Mobic. He did get moderate pain relief. He did admit that he does get occasional groin pain which is primarily negotiating stairs. But the majority of his pain is lateral. I reexamined him and ranging left hip only created lateral hip pain. He was also tender to palpation over posterior lateral trochanter. He has already tried PT without relief. We therefore injected his painful trochanteric bursa with lidocaine and kenalog. He will keep a log over the  next few hours about how he feels, continue to Mobic and f/u in 2-3 weeks. \par \par 12/10/2021- Pt presents for a follow up after having a Kenalog and lidocaine injection into the left bursa on the last visit. He has also remained on Mobic. He states that the pain now is a 3 or 4. He attributes him improvement to the Mobic and not to the injection. I did reexamine his hip. He has a reasonable ROM with no groin pain. But he is still very tender over the trochanter. I think at this point he would benefit from PT. This was prescribed. He will remain on Mobic under PCP and f/u in 2 months time. \par \par 02/11/2022- Pt says his hip is a little better. He is still in PT. His knee has started to hurt him in the mean time. He will make an appt to come into the office for xray. \par \par 2/18/2022- Pt presents to the office with complaints of right knee pain. Pain is over the anterior aspect of the knee. It is associated with getting in and out of chairs and going up and down stairs. On exam his pain localizes to the patella and his radiographs show a laterally tracking patella on skyline. We will therefore start him on a rehab program with an emphasize on his VMO. He can f/u in 3 months time.

## 2022-03-10 ENCOUNTER — APPOINTMENT (OUTPATIENT)
Dept: UROLOGY | Facility: CLINIC | Age: 62
End: 2022-03-10
Payer: MEDICARE

## 2022-03-10 VITALS — HEIGHT: 70 IN | WEIGHT: 181 LBS | BODY MASS INDEX: 25.91 KG/M2

## 2022-03-10 DIAGNOSIS — N20.1 CALCULUS OF URETER: ICD-10-CM

## 2022-03-10 PROCEDURE — 99204 OFFICE O/P NEW MOD 45 MIN: CPT

## 2022-03-10 NOTE — ADDENDUM
[FreeTextEntry1] : Patient's note was transcribed with the assistance of a medical scribe under the supervision of Dr. Polk.\par I, Dr. Polk, have reviewed the patient's chart and agree that it aligns with my medical decisions.\par Harmony Bach, our scribe, also served as a chaperone for physical examination purposes.\par \par \par

## 2022-03-10 NOTE — HISTORY OF PRESENT ILLNESS
[FreeTextEntry1] : ANGLE KING is a 61 year old male who presents for consultation for renal colic, CT found to have Lt ureteral stone, passed per pt.\par \par Pt reports in December he initially experienced LLQ pain, found to have Lt ureteral stone which he passed. Reports no pain at this time. Denies gross hematuria, dysuria or associated symptoms. \par Pt does not recall having a PSA done in the past. \par Pt reports he is taking multivitamin supplement at this time. \par \par  PMH: Previously one episode of renal stone, prior pt of dr regalado no gu procedures\par Family History: No  malignancies\par Social History: on disability \par \par CT scan images visualized from 12/2021: Mild Lt hydroureteronephrosis to the level of 3 mm proximal ureteral stone, additional 1 mm stone proximal to the initial stone. [id number]=[id number]. No stones in the kidneys BL. \par 8 mm pulmonary nodule. WHich he has been following for 20 years (no change). \par \par Cr=1.2 \par UA: negative \par Ca= 9.5 // \par \par \par

## 2022-03-10 NOTE — ASSESSMENT
[FreeTextEntry1] : ANGLE KING is a 61 year old male who presents for consultation for renal colic, CT found to have Lt ureteral stone, passed per pt. Asymptomatic now.\par \par Plan: \par -Renal BLadder US assess passage \par -Stone prevention diet with increased hydration  -D/C Multivitamin if not deficient \par -PSA (today)\par -f/u Telemed to review the symptoms\par \par \par Stone nutritional counseling given--First and foremost, the most important recommendation is to increase water intake. I have recommended that he drink enough water to produce 2.5L of urine per day. More easily put, I have recommended the patient consume enough water to keep His urine clear. I have also recommended adding crystal light (or lemon) which contains citrate which prevents stone formation. I have also stressed the importance of minimizing salt and animal protein in the diet.\par

## 2022-03-23 ENCOUNTER — RESULT REVIEW (OUTPATIENT)
Age: 62
End: 2022-03-23

## 2022-03-23 ENCOUNTER — OUTPATIENT (OUTPATIENT)
Dept: OUTPATIENT SERVICES | Facility: HOSPITAL | Age: 62
LOS: 1 days | Discharge: HOME | End: 2022-03-23
Payer: MEDICARE

## 2022-03-23 DIAGNOSIS — Z87.19 PERSONAL HISTORY OF OTHER DISEASES OF THE DIGESTIVE SYSTEM: Chronic | ICD-10-CM

## 2022-03-23 DIAGNOSIS — N13.30 UNSPECIFIED HYDRONEPHROSIS: ICD-10-CM

## 2022-03-23 DIAGNOSIS — Z98.890 OTHER SPECIFIED POSTPROCEDURAL STATES: Chronic | ICD-10-CM

## 2022-03-23 DIAGNOSIS — M25.559 PAIN IN UNSPECIFIED HIP: ICD-10-CM

## 2022-03-23 PROCEDURE — 73525 CONTRAST X-RAY OF HIP: CPT | Mod: 26,LT

## 2022-03-23 PROCEDURE — 76770 US EXAM ABDO BACK WALL COMP: CPT | Mod: 26

## 2022-03-23 PROCEDURE — 27093 INJECTION FOR HIP X-RAY: CPT | Mod: LT

## 2022-03-25 ENCOUNTER — NON-APPOINTMENT (OUTPATIENT)
Age: 62
End: 2022-03-25

## 2022-03-31 ENCOUNTER — NON-APPOINTMENT (OUTPATIENT)
Age: 62
End: 2022-03-31

## 2022-03-31 LAB
PSA FREE FLD-MCNC: 29 %
PSA FREE SERPL-MCNC: 0.25 NG/ML
PSA SERPL-MCNC: 0.87 NG/ML

## 2022-04-28 ENCOUNTER — APPOINTMENT (OUTPATIENT)
Dept: UROLOGY | Facility: CLINIC | Age: 62
End: 2022-04-28
Payer: MEDICARE

## 2022-04-28 DIAGNOSIS — Z12.5 ENCOUNTER FOR SCREENING FOR MALIGNANT NEOPLASM OF PROSTATE: ICD-10-CM

## 2022-04-28 DIAGNOSIS — N13.30 UNSPECIFIED HYDRONEPHROSIS: ICD-10-CM

## 2022-04-28 DIAGNOSIS — N28.1 CYST OF KIDNEY, ACQUIRED: ICD-10-CM

## 2022-04-28 PROCEDURE — 99214 OFFICE O/P EST MOD 30 MIN: CPT | Mod: 95

## 2022-04-28 NOTE — ASSESSMENT
[FreeTextEntry1] : ANGLE KING is a 61 year old male who presents for consultation for renal colic, CT found to have Lt ureteral stone, passed per pt. Asymptomatic now and sono confirms passage.\par \par Plan: \par -Renal sono one year\par -Stone prevention diet with increased hydration  -D/C Multivitamin if not deficient \par -PSA 1 year\par fu1  year \par \par \par \par Stone nutritional counseling given--First and foremost, the most important recommendation is to increase water intake. I have recommended that he drink enough water to produce 2.5L of urine per day. More easily put, I have recommended the patient consume enough water to keep His urine clear. I have also recommended adding crystal light (or lemon) which contains citrate which prevents stone formation. I have also stressed the importance of minimizing salt and animal protein in the diet.\par

## 2022-04-28 NOTE — HISTORY OF PRESENT ILLNESS
[Home] : at home, [unfilled] , at the time of the visit. [Medical Office: ([facility])___] : at the medical office located in  [Verbal consent obtained from patient] : the patient, [unfilled] [FreeTextEntry1] : ANGLE KING is a 61 year old male who presents for consultation for renal colic, CT found to have Lt ureteral stone, passed per pt.\par \par Patient denies any flank pain or hematuria.\par \par RBUS 3/2022 images visualized agree with findings and the cyst has in fact been present since at least 2009\par 1.  No hydronephrosis. Interval resolution of mild left hydronephrosis seen on 12/30/2021 CT scan.\par 2.  Left renal cyst with peripheral calcification, stable compared to CT.\par 3.  Mildly enlarged prostate gland without evidence of urinary retention. Normal \par \par PSA 0.87 3/2022\par \par  PMH: Previously one episode of renal stone, prior pt of dr regalado no gu procedures\par Family History: No  malignancies\par Social History: on disability \par \par CT scan images visualized from 12/2021: Mild Lt hydroureteronephrosis to the level of 3 mm proximal ureteral stone, additional 1 mm stone proximal to the initial stone. [id number]. No stones in the kidneys BL. \par 8 mm pulmonary nodule. WHich he has been following for 20 years (no change). \par \par Cr=1.2 \par UA: negative \par Ca= 9.5 // \par \par \par

## 2022-05-18 ENCOUNTER — APPOINTMENT (OUTPATIENT)
Dept: ORTHOPEDIC SURGERY | Facility: CLINIC | Age: 62
End: 2022-05-18
Payer: MEDICARE

## 2022-05-18 PROCEDURE — 99213 OFFICE O/P EST LOW 20 MIN: CPT

## 2022-05-18 NOTE — HISTORY OF PRESENT ILLNESS
[de-identified] : 11/3/2021 - 61 year old male presents to the office today complaining of left hip pain for more than one year that is worsening. Patient reports pain that is sharp in nature. He states he was scheduled for a total hip replacement with another orthopedist but was canceled due to not getting his COVID test. He states this was the fault of the office and he knew nothing about the test. Patient reports clicking and a loss of motion. He uses a cane for long walks but states he is mostly only able to ambulate about one block at a time. Patient reports pain and difficulty with negotiating stairs. There is also difficulty with putting socks and shoes on. Patient reports taking Ibuprofen 800 mg leftover from a previous femur fracture repair and taking Gabapentin given by his PCP with only minimal relief. Patient has done physical therapy for his left hip in the past without any relief. Patient is here today to discuss his next options for pain relief. \par \par 11/19/2021 - 61 year old male presents to the office today for a follow up on his painful left hip. Patient was started on Meloxicam when we last saw him but reports only mild relief. He continues to have pain located mostly at the lateral hip and in the groin. Patient continues to have to ambulate using a cane for long walks. Patient is here today to discuss his next options for pain relief. \par \par 12/10/2021 - 61 year old male presents to the office today for a follow up on his painful left hip. When we last saw the patient, he received a cortisone injection into the bursa of the left hip. He denies any relief with this and states he did not feel a difference. Patient continues to take Meloxicam but reports only having mild relief with it. Patient is here today to discuss his next options for pain relief.\par \par 5/18/2022 - 61 year old male presents to the office today for a follow up after doing PT and receiving a diagnostic hip injection. Patient states the injection gave him relief x3 weeks, but states that his groin pain was gone and is now mild. Patient is doing PT 2x per week for his hip and knee but doesn’t feel that it is helping. He is taking Meloxicam and Tylenol for pain with some relief. Patient is here today to discuss his next options for pain relief.

## 2022-05-18 NOTE — ASSESSMENT
[FreeTextEntry1] : Pt presents for a follow up after receiving a diagnostic injection into the left hip. Consisting of Kenalog and Marcaine. He states that several hours after the shot, he experienced relief of his left groin and left lateral hip pain. He is also in PT and taking Mobic. He feels these are also helpful. He is complaining of bilateral knee pain. I have felt this in the past to be PF. Both the PT and the Mobic should help that as well. He is not interested in having a hip replacement at this time. He can f/u in 1 year, sooner if he wants to schedule surgery for the hip.

## 2022-05-22 ENCOUNTER — EMERGENCY (EMERGENCY)
Facility: HOSPITAL | Age: 62
LOS: 0 days | Discharge: HOME | End: 2022-05-22
Attending: EMERGENCY MEDICINE | Admitting: EMERGENCY MEDICINE
Payer: MEDICARE

## 2022-05-22 VITALS
RESPIRATION RATE: 17 BRPM | WEIGHT: 173.94 LBS | TEMPERATURE: 101 F | HEIGHT: 70 IN | HEART RATE: 148 BPM | DIASTOLIC BLOOD PRESSURE: 70 MMHG | OXYGEN SATURATION: 98 % | SYSTOLIC BLOOD PRESSURE: 108 MMHG

## 2022-05-22 VITALS — HEART RATE: 93 BPM

## 2022-05-22 DIAGNOSIS — M19.90 UNSPECIFIED OSTEOARTHRITIS, UNSPECIFIED SITE: ICD-10-CM

## 2022-05-22 DIAGNOSIS — Z87.19 PERSONAL HISTORY OF OTHER DISEASES OF THE DIGESTIVE SYSTEM: Chronic | ICD-10-CM

## 2022-05-22 DIAGNOSIS — R10.9 UNSPECIFIED ABDOMINAL PAIN: ICD-10-CM

## 2022-05-22 DIAGNOSIS — Z98.890 OTHER SPECIFIED POSTPROCEDURAL STATES: Chronic | ICD-10-CM

## 2022-05-22 DIAGNOSIS — Z20.822 CONTACT WITH AND (SUSPECTED) EXPOSURE TO COVID-19: ICD-10-CM

## 2022-05-22 DIAGNOSIS — R50.9 FEVER, UNSPECIFIED: ICD-10-CM

## 2022-05-22 DIAGNOSIS — E78.5 HYPERLIPIDEMIA, UNSPECIFIED: ICD-10-CM

## 2022-05-22 DIAGNOSIS — Z87.442 PERSONAL HISTORY OF URINARY CALCULI: ICD-10-CM

## 2022-05-22 DIAGNOSIS — R30.0 DYSURIA: ICD-10-CM

## 2022-05-22 DIAGNOSIS — I10 ESSENTIAL (PRIMARY) HYPERTENSION: ICD-10-CM

## 2022-05-22 DIAGNOSIS — N30.90 CYSTITIS, UNSPECIFIED WITHOUT HEMATURIA: ICD-10-CM

## 2022-05-22 DIAGNOSIS — R00.0 TACHYCARDIA, UNSPECIFIED: ICD-10-CM

## 2022-05-22 LAB
ALBUMIN SERPL ELPH-MCNC: 4.1 G/DL — SIGNIFICANT CHANGE UP (ref 3.5–5.2)
ALP SERPL-CCNC: 80 U/L — SIGNIFICANT CHANGE UP (ref 30–115)
ALT FLD-CCNC: 48 U/L — HIGH (ref 0–41)
ANION GAP SERPL CALC-SCNC: 14 MMOL/L — SIGNIFICANT CHANGE UP (ref 7–14)
APPEARANCE UR: CLEAR — SIGNIFICANT CHANGE UP
APTT BLD: 30.8 SEC — SIGNIFICANT CHANGE UP (ref 27–39.2)
AST SERPL-CCNC: 30 U/L — SIGNIFICANT CHANGE UP (ref 0–41)
BACTERIA # UR AUTO: NEGATIVE — SIGNIFICANT CHANGE UP
BASOPHILS # BLD AUTO: 0.03 K/UL — SIGNIFICANT CHANGE UP (ref 0–0.2)
BASOPHILS NFR BLD AUTO: 0.2 % — SIGNIFICANT CHANGE UP (ref 0–1)
BILIRUB SERPL-MCNC: 0.8 MG/DL — SIGNIFICANT CHANGE UP (ref 0.2–1.2)
BILIRUB UR-MCNC: NEGATIVE — SIGNIFICANT CHANGE UP
BUN SERPL-MCNC: 21 MG/DL — HIGH (ref 10–20)
CALCIUM SERPL-MCNC: 9.6 MG/DL — SIGNIFICANT CHANGE UP (ref 8.5–10.1)
CHLORIDE SERPL-SCNC: 102 MMOL/L — SIGNIFICANT CHANGE UP (ref 98–110)
CO2 SERPL-SCNC: 20 MMOL/L — SIGNIFICANT CHANGE UP (ref 17–32)
COLOR SPEC: SIGNIFICANT CHANGE UP
CREAT SERPL-MCNC: 1.2 MG/DL — SIGNIFICANT CHANGE UP (ref 0.7–1.5)
DIFF PNL FLD: NEGATIVE — SIGNIFICANT CHANGE UP
EGFR: 68 ML/MIN/1.73M2 — SIGNIFICANT CHANGE UP
EOSINOPHIL # BLD AUTO: 0.01 K/UL — SIGNIFICANT CHANGE UP (ref 0–0.7)
EOSINOPHIL NFR BLD AUTO: 0.1 % — SIGNIFICANT CHANGE UP (ref 0–8)
EPI CELLS # UR: 1 /HPF — SIGNIFICANT CHANGE UP (ref 0–5)
GLUCOSE SERPL-MCNC: 118 MG/DL — HIGH (ref 70–99)
GLUCOSE UR QL: NEGATIVE — SIGNIFICANT CHANGE UP
HCT VFR BLD CALC: 39.7 % — LOW (ref 42–52)
HGB BLD-MCNC: 13.7 G/DL — LOW (ref 14–18)
HYALINE CASTS # UR AUTO: 0 /LPF — SIGNIFICANT CHANGE UP (ref 0–7)
IMM GRANULOCYTES NFR BLD AUTO: 0.7 % — HIGH (ref 0.1–0.3)
INR BLD: 1.25 RATIO — SIGNIFICANT CHANGE UP (ref 0.65–1.3)
KETONES UR-MCNC: NEGATIVE — SIGNIFICANT CHANGE UP
LACTATE BLDV-MCNC: 2 MMOL/L — SIGNIFICANT CHANGE UP (ref 0.5–2)
LACTATE SERPL-SCNC: 2 MMOL/L — SIGNIFICANT CHANGE UP (ref 0.7–2)
LEUKOCYTE ESTERASE UR-ACNC: ABNORMAL
LYMPHOCYTES # BLD AUTO: 0.67 K/UL — LOW (ref 1.2–3.4)
LYMPHOCYTES # BLD AUTO: 5 % — LOW (ref 20.5–51.1)
MCHC RBC-ENTMCNC: 31.8 PG — HIGH (ref 27–31)
MCHC RBC-ENTMCNC: 34.5 G/DL — SIGNIFICANT CHANGE UP (ref 32–37)
MCV RBC AUTO: 92.1 FL — SIGNIFICANT CHANGE UP (ref 80–94)
MONOCYTES # BLD AUTO: 0.67 K/UL — HIGH (ref 0.1–0.6)
MONOCYTES NFR BLD AUTO: 5 % — SIGNIFICANT CHANGE UP (ref 1.7–9.3)
NEUTROPHILS # BLD AUTO: 12.06 K/UL — HIGH (ref 1.4–6.5)
NEUTROPHILS NFR BLD AUTO: 89 % — HIGH (ref 42.2–75.2)
NITRITE UR-MCNC: NEGATIVE — SIGNIFICANT CHANGE UP
NRBC # BLD: 0 /100 WBCS — SIGNIFICANT CHANGE UP (ref 0–0)
PH UR: 6.5 — SIGNIFICANT CHANGE UP (ref 5–8)
PLATELET # BLD AUTO: 215 K/UL — SIGNIFICANT CHANGE UP (ref 130–400)
POTASSIUM SERPL-MCNC: 4 MMOL/L — SIGNIFICANT CHANGE UP (ref 3.5–5)
POTASSIUM SERPL-SCNC: 4 MMOL/L — SIGNIFICANT CHANGE UP (ref 3.5–5)
PROT SERPL-MCNC: 6.6 G/DL — SIGNIFICANT CHANGE UP (ref 6–8)
PROT UR-MCNC: SIGNIFICANT CHANGE UP
PROTHROM AB SERPL-ACNC: 14.4 SEC — HIGH (ref 9.95–12.87)
RBC # BLD: 4.31 M/UL — LOW (ref 4.7–6.1)
RBC # FLD: 13.1 % — SIGNIFICANT CHANGE UP (ref 11.5–14.5)
RBC CASTS # UR COMP ASSIST: 2 /HPF — SIGNIFICANT CHANGE UP (ref 0–4)
SARS-COV-2 RNA SPEC QL NAA+PROBE: SIGNIFICANT CHANGE UP
SODIUM SERPL-SCNC: 136 MMOL/L — SIGNIFICANT CHANGE UP (ref 135–146)
SP GR SPEC: 1.01 — SIGNIFICANT CHANGE UP (ref 1.01–1.03)
UROBILINOGEN FLD QL: SIGNIFICANT CHANGE UP
WBC # BLD: 13.53 K/UL — HIGH (ref 4.8–10.8)
WBC # FLD AUTO: 13.53 K/UL — HIGH (ref 4.8–10.8)
WBC UR QL: 11 /HPF — HIGH (ref 0–5)

## 2022-05-22 PROCEDURE — 99285 EMERGENCY DEPT VISIT HI MDM: CPT

## 2022-05-22 PROCEDURE — 71045 X-RAY EXAM CHEST 1 VIEW: CPT | Mod: 26

## 2022-05-22 PROCEDURE — 93010 ELECTROCARDIOGRAM REPORT: CPT

## 2022-05-22 PROCEDURE — 74176 CT ABD & PELVIS W/O CONTRAST: CPT | Mod: 26,MA

## 2022-05-22 RX ORDER — ACETAMINOPHEN 500 MG
650 TABLET ORAL ONCE
Refills: 0 | Status: COMPLETED | OUTPATIENT
Start: 2022-05-22 | End: 2022-05-22

## 2022-05-22 RX ORDER — MORPHINE SULFATE 50 MG/1
4 CAPSULE, EXTENDED RELEASE ORAL ONCE
Refills: 0 | Status: DISCONTINUED | OUTPATIENT
Start: 2022-05-22 | End: 2022-05-22

## 2022-05-22 RX ORDER — CEFDINIR 250 MG/5ML
1 POWDER, FOR SUSPENSION ORAL
Qty: 20 | Refills: 0
Start: 2022-05-22 | End: 2022-05-31

## 2022-05-22 RX ORDER — CEFTRIAXONE 500 MG/1
1000 INJECTION, POWDER, FOR SOLUTION INTRAMUSCULAR; INTRAVENOUS ONCE
Refills: 0 | Status: COMPLETED | OUTPATIENT
Start: 2022-05-22 | End: 2022-05-22

## 2022-05-22 RX ORDER — SODIUM CHLORIDE 9 MG/ML
2400 INJECTION INTRAMUSCULAR; INTRAVENOUS; SUBCUTANEOUS ONCE
Refills: 0 | Status: DISCONTINUED | OUTPATIENT
Start: 2022-05-22 | End: 2022-05-22

## 2022-05-22 RX ORDER — SODIUM CHLORIDE 9 MG/ML
2400 INJECTION, SOLUTION INTRAVENOUS ONCE
Refills: 0 | Status: COMPLETED | OUTPATIENT
Start: 2022-05-22 | End: 2022-05-22

## 2022-05-22 RX ADMIN — CEFTRIAXONE 100 MILLIGRAM(S): 500 INJECTION, POWDER, FOR SOLUTION INTRAMUSCULAR; INTRAVENOUS at 15:42

## 2022-05-22 RX ADMIN — SODIUM CHLORIDE 2400 MILLILITER(S): 9 INJECTION, SOLUTION INTRAVENOUS at 17:00

## 2022-05-22 RX ADMIN — CEFTRIAXONE 1000 MILLIGRAM(S): 500 INJECTION, POWDER, FOR SOLUTION INTRAMUSCULAR; INTRAVENOUS at 16:00

## 2022-05-22 RX ADMIN — SODIUM CHLORIDE 2400 MILLILITER(S): 9 INJECTION, SOLUTION INTRAVENOUS at 15:45

## 2022-05-22 NOTE — ED PROVIDER NOTE - CLINICAL SUMMARY MEDICAL DECISION MAKING FREE TEXT BOX
62-year-old male presents emerged from with right flank and right lower quadrant pain.  History of stones in the past initial impression was infected kidney stone due to existing tachycardia.  Emergency department screening exam, labs, imaging, parenteral IV antibiotics administration.  Patient is well-appearing, with wife and prefers outpatient management.  CT imaging reveals no nephrolithiasis, no appendicitis.  Vital signs improved with IV hydration in the ED, no acute emergent findings on lab work, will discharge with outpatient management and return follow-up instructions.  Will continue on oral antibiotics as outpatient.

## 2022-05-22 NOTE — ED PROVIDER NOTE - CARE PROVIDER_API CALL
Yoshi Riley)  Urology  [address] [place] [address]  Phone: [phone]  Fax: [phone]  Follow Up Time: 1-3 Days

## 2022-05-22 NOTE — ED ADULT TRIAGE NOTE - CHIEF COMPLAINT QUOTE
pt here for abd pain x 2 days. pt also c.o not being able to urinate effectively. pt HR in triage 148. ekg in progresws

## 2022-05-22 NOTE — ED ADULT NURSE NOTE - NS ED NURSE LEVEL OF CONSCIOUSNESS SPEECH
Report received at 7:00 AM from NOC BERNARD Martinez, pt is sitting up at the edge of the bed, awake and alert with no reports of pain or dicomfort. Bed is locked in lowest position with call light, belongings within reach, white board updated, and POC discussed including VIBHA and echo. All needs met at this time.    Speaking Coherently/Age appropriate

## 2022-05-22 NOTE — ED PROVIDER NOTE - PHYSICAL EXAMINATION
CONST: well appearing for age  HEAD:  normocephalic, atraumatic  EYES:  conjunctivae without injection, drainage or discharge  ENMT:  tympanic membranes pearly gray with normal landmarks; nasal mucosa moist; mouth moist without ulcerations or lesions; throat moist without erythema, exudate, ulcerations or lesions  NECK:  supple, no masses, no significant lymphadenopathy  CARDIAC:  regular rate and rhythm, normal S1 and S2, no murmurs, rubs or gallops  RESP:  respiratory rate and effort appear normal for age; lungs are clear to auscultation bilaterally; no rales or wheezes  ABDOMEN:  soft, + right CVA tenderness, + suprapubic tenderness  MUSCULOSKELETAL/NEURO:  CN II-XII grossly intact, sensation intact throughout, 5/5 strength in all extremities, normal movement, normal tone  SKIN:  normal skin color for age and race, well-perfused; warm and dry

## 2022-05-22 NOTE — ED PROVIDER NOTE - OBJECTIVE STATEMENT
Pt is a 61 y/o male with PMH of HTN, HLD, osteoarthritis and kidney stones presenting for right flank pain. Reports pain started 2 days ago, has progressively worsened. Associated with dysuria and fever. No nausea/vomiting, no hematuria.

## 2022-05-22 NOTE — ED PROVIDER NOTE - NS ED ROS FT
Review of Systems:  CONSTITUTIONAL: + fever, No diaphoresis, No weight change  SKIN: No rash  HEMATOLOGIC: No abnormal bleeding or bruising  EYES: No eye pain, No blurred vision  ENT: No change in hearing, No sore throat, No neck pain, No rhinorrhea, No ear pain  RESPIRATORY: No shortness of breath, No cough  CARDIAC: No chest pain, No palpitations  GI: + right sided abdominal pain, No nausea, No vomiting, No diarrhea, No constipation, No bright red blood per rectum or melena. No flank pain  : No dysuria, frequency, hematuria.   ENDO: No polydypsia, No polyuria, No heat/cold intolerance  MUSCULOSKELETAL: No joint paint, No swelling, No back pain  NEUROLOGIC: No numbness, No focal weakness, No headache, No dizziness  All other systems negative, unless specified in HPI

## 2022-05-22 NOTE — ED PROVIDER NOTE - PATIENT PORTAL LINK FT
You can access the FollowMyHealth Patient Portal offered by  by registering at the following website: [email or web address]. By joining [employer]’s FollowMyHealth portal, you will also be able to view your health information using other applications (apps) compatible with our system.

## 2022-05-22 NOTE — ED PROVIDER NOTE - ATTENDING CONTRIBUTION TO CARE
I personally evaluated the patient. I reviewed the Resident´s or Physician Assistant´s note (as assigned above), and agree with the findings and plan except as documented in my note.    62-year-old male presents to emergency department for 1 days worth of right lower quadrant pain/right flank pain.  History of stones in the past, is followed by outpatient urology.  No vomiting, fevers or chills.    The review of systems otherwise unremarkable    GENERAL: male in no distress.   HEENT: EOMI non icteric   NECK: FROM  CHEST: normal work of breathing noted. CTA bilateral.   CV: pulses intact S1S2 regular  ABD: soft, non rigid, non distended.  Mild right lower quadrant tenderness without rebound or guarding  EXTR: FROM   NEURO: AAO 3 no focal deficits  SKIN: normal no pallor  PSYCH: normal mood & mentation    Impression right flank pain, RLQ pain    Plan: IV, labs, imaging, supportive care & reevaluation

## 2022-05-22 NOTE — ED PROVIDER NOTE - NSFOLLOWUPINSTRUCTIONS_ED_ALL_ED_FT
Urinary Tract Infection, Adult       A urinary tract infection (UTI) is an infection of any part of the urinary tract. The urinary tract includes the kidneys, ureters, bladder, and urethra. These organs make, store, and get rid of urine in the body.    An upper UTI affects the ureters and kidneys. A lower UTI affects the bladder and urethra.      What are the causes?    Most urinary tract infections are caused by bacteria in your genital area around your urethra, where urine leaves your body. These bacteria grow and cause inflammation of your urinary tract.      What increases the risk?    You are more likely to develop this condition if:  •You have a urinary catheter that stays in place.      •You are not able to control when you urinate or have a bowel movement (incontinence).    •You are female and you:  •Use a spermicide or diaphragm for birth control.      •Have low estrogen levels.      •Are pregnant.        •You have certain genes that increase your risk.      •You are sexually active.      •You take antibiotic medicines.    •You have a condition that causes your flow of urine to slow down, such as:  •An enlarged prostate, if you are male.      •Blockage in your urethra.      •A kidney stone.      •A nerve condition that affects your bladder control (neurogenic bladder).      •Not getting enough to drink, or not urinating often.      •You have certain medical conditions, such as:  •Diabetes.      •A weak disease-fighting system (immunesystem).      •Sickle cell disease.      •Gout.      •Spinal cord injury.          What are the signs or symptoms?    Symptoms of this condition include:  •Needing to urinate right away (urgency).      •Frequent urination. This may include small amounts of urine each time you urinate.      •Pain or burning with urination.      •Blood in the urine.      •Urine that smells bad or unusual.      •Trouble urinating.      •Cloudy urine.      •Vaginal discharge, if you are female.      •Pain in the abdomen or the lower back.      You may also have:  •Vomiting or a decreased appetite.      •Confusion.      •Irritability or tiredness.      •A fever or chills.      •Diarrhea.      The first symptom in older adults may be confusion. In some cases, they may not have any symptoms until the infection has worsened.      How is this diagnosed?    This condition is diagnosed based on your medical history and a physical exam. You may also have other tests, including:  •Urine tests.      •Blood tests.      •Tests for STIs (sexually transmitted infections).      If you have had more than one UTI, a cystoscopy or imaging studies may be done to determine the cause of the infections.      How is this treated?    Treatment for this condition includes:  •Antibiotic medicine.      •Over-the-counter medicines to treat discomfort.      •Drinking enough water to stay hydrated.      If you have frequent infections or have other conditions such as a kidney stone, you may need to see a health care provider who specializes in the urinary tract (urologist).    In rare cases, urinary tract infections can cause sepsis. Sepsis is a life-threatening condition that occurs when the body responds to an infection. Sepsis is treated in the hospital with IV antibiotics, fluids, and other medicines.      Follow these instructions at home:     Medicines     •Take over-the-counter and prescription medicines only as told by your health care provider.      •If you were prescribed an antibiotic medicine, take it as told by your health care provider. Do not stop using the antibiotic even if you start to feel better.      General instructions   •Make sure you:  •Empty your bladder often and completely. Do not hold urine for long periods of time.      •Empty your bladder after sex.      •Wipe from front to back after urinating or having a bowel movement if you are female. Use each tissue only one time when you wipe.        •Drink enough fluid to keep your urine pale yellow.      •Keep all follow-up visits. This is important.        Contact a health care provider if:    •Your symptoms do not get better after 1–2 days.      •Your symptoms go away and then return.        Get help right away if:    •You have severe pain in your back or your lower abdomen.      •You have a fever or chills.      •You have nausea or vomiting.        Summary    •A urinary tract infection (UTI) is an infection of any part of the urinary tract, which includes the kidneys, ureters, bladder, and urethra.      •Most urinary tract infections are caused by bacteria in your genital area.      •Treatment for this condition often includes antibiotic medicines.      •If you were prescribed an antibiotic medicine, take it as told by your health care provider. Do not stop using the antibiotic even if you start to feel better.      •Keep all follow-up visits. This is important.

## 2022-05-23 LAB
CULTURE RESULTS: SIGNIFICANT CHANGE UP
SPECIMEN SOURCE: SIGNIFICANT CHANGE UP

## 2022-05-28 LAB
CULTURE RESULTS: SIGNIFICANT CHANGE UP
CULTURE RESULTS: SIGNIFICANT CHANGE UP
SPECIMEN SOURCE: SIGNIFICANT CHANGE UP
SPECIMEN SOURCE: SIGNIFICANT CHANGE UP

## 2022-06-15 DIAGNOSIS — R30.0 DYSURIA: ICD-10-CM

## 2022-06-20 ENCOUNTER — APPOINTMENT (OUTPATIENT)
Dept: UROLOGY | Facility: CLINIC | Age: 62
End: 2022-06-20
Payer: MEDICARE

## 2022-06-20 VITALS
WEIGHT: 183 LBS | DIASTOLIC BLOOD PRESSURE: 87 MMHG | HEIGHT: 70 IN | HEART RATE: 88 BPM | BODY MASS INDEX: 26.2 KG/M2 | SYSTOLIC BLOOD PRESSURE: 133 MMHG

## 2022-06-20 DIAGNOSIS — N41.9 INFLAMMATORY DISEASE OF PROSTATE, UNSPECIFIED: ICD-10-CM

## 2022-06-20 PROCEDURE — 99214 OFFICE O/P EST MOD 30 MIN: CPT

## 2022-06-20 RX ORDER — PHENAZOPYRIDINE HYDROCHLORIDE 200 MG/1
200 TABLET ORAL 3 TIMES DAILY
Qty: 21 | Refills: 3 | Status: ACTIVE | COMMUNITY
Start: 2022-06-20 | End: 1900-01-01

## 2022-06-20 NOTE — HISTORY OF PRESENT ILLNESS
[Home] : at home, [unfilled] , at the time of the visit. [Medical Office: ([facility])___] : at the medical office located in  [Verbal consent obtained from patient] : the patient, [unfilled] [FreeTextEntry1] : ANGLE KING is a 61 year old male who presents for consultation for renal colic, CT found to have Lt ureteral stone, passed per pt. Asymptomatic now and sono confirms passage.\par Now presents with dysuria improving. \par \par may 22 2022 - reported to [facility] because he believed and showed symptoms of a stone - fever , nausea and generally malaise. \par they ordered a ct scan \par Pt took more antibiotics , but still experiencing dysuria \par Patient denies any flank pain or hematuria.\par \par CT abdomen pelvis images visualized from May 2022\par  no hydroperinephrosis , bilaterally no kidney stones or urt \par left renal cyst 3 cm with peripheral calcification , seen previously unchanged \par Hounsfield units 19\par perivesical stranding \par \par UA \par UA- 5/22 \par wBS , no nitrate ,\par UC - no growth\par \par previously -\par RBUS 3/2022 images visualized agree with findings and the cyst has in fact been present since at least 2009\par 1.  No hydronephrosis. Interval resolution of mild left hydronephrosis seen on 12/30/2021 CT scan.\par 2.  Left renal cyst with peripheral calcification, stable compared to CT.\par 3.  Mildly enlarged prostate gland without evidence of urinary retention. Normal \par \par PSA 0.87 3/2022\par \par  PMH: Previously one episode of renal stone, prior pt of dr regalado no gu procedures\par Family History: No  malignancies\par Social History: on disability \par \par CT scan images visualized from 12/2021: Mild Lt hydroureteronephrosis to the level of 3 mm proximal ureteral stone, additional 1 mm stone proximal to the initial stone. [id number]. No stones in the kidneys BL. \par 8 mm pulmonary nodule. WHich he has been following for 20 years (no change). \par \par Cr=1.2 \par UA: negative \par Ca= 9.5 // \par \par \par

## 2022-06-20 NOTE — ASSESSMENT
[FreeTextEntry1] : ANGLE KING is a 61 year old male who presents for consultation for renal colic, CT found to have Lt ureteral stone, passed per pt. Asymptomatic now and sono confirms passage.\par Now presents with dysuria improving. Culture neg. Perhaps this was a prostatitis. \par \par Recommend Pyridium\par -Stone prevention diet with increased hydration  -D/C Multivitamin if not deficient \par -PSA 1 year\par fu1  year \par \par \par Stone nutritional counseling given--First and foremost, the most important recommendation is to increase water intake. I have recommended that he drink enough water to produce 2.5L of urine per day. More easily put, I have recommended the patient consume enough water to keep His urine clear. I have also recommended adding crystal light (or lemon) which contains citrate which prevents stone formation. I have also stressed the importance of minimizing salt and animal protein in the diet.\par

## 2022-06-20 NOTE — PHYSICAL EXAM
[Urethral Meatus] : meatus normal [Urinary Bladder Findings] : the bladder was normal on palpation [Scrotum] : the scrotum was normal [Testes Mass (___cm)] : there were no testicular masses [No Prostate Nodules] : no prostate nodules [FreeTextEntry1] : 20 grams non tender

## 2022-06-20 NOTE — HISTORY OF PRESENT ILLNESS
[Home] : at home, [unfilled] , at the time of the visit. [Medical Office: ([facility])___] : at the medical office located in  [Verbal consent obtained from patient] : the patient, [unfilled] [FreeTextEntry1] : ANGLE KING is a 61 year old male who presents for consultation for renal colic, CT found to have Lt ureteral stone, passed per pt. Asymptomatic now and sono confirms passage.\par Now presents with dysuria improving. \par \par may 22 2022 - reported to [facility] because he believed and showed symptoms of a stone - fever , nausea and generally malaise. \par they ordered a ct scan \par Pt took more antibiotics , but still experiencing dysuria \par Patient denies any flank pain or hematuria.\par \par CT abdomen pelvis images visualized from May 2022\par  no hydroperinephrosis , bilaterally no kidney stones or urt \par left renal cyst 3 cm with peripheral calcification , seen previously unchanged \par Hounsfield units 19\par perivesical stranding \par \par UA \par UA- 5/22 \par wBS , no nitrate ,\par UC - no growth\par \par previously -\par RBUS 3/2022 images visualized agree with findings and the cyst has in fact been present since at least 2009\par 1.  No hydronephrosis. Interval resolution of mild left hydronephrosis seen on 12/30/2021 CT scan.\par 2.  Left renal cyst with peripheral calcification, stable compared to CT.\par 3.  Mildly enlarged prostate gland without evidence of urinary retention. Normal \par \par PSA 0.87 3/2022\par \par  PMH: Previously one episode of renal stone, prior pt of dr regalado no gu procedures\par Family History: No  malignancies\par Social History: on disability \par \par CT scan images visualized from 12/2021: Mild Lt hydroureteronephrosis to the level of 3 mm proximal ureteral stone, additional 1 mm stone proximal to the initial stone. BZ=2512. No stones in the kidneys BL. \par 8 mm pulmonary nodule. WHich he has been following for 20 years (no change). \par \par Cr=1.2 \par UA: negative \par Ca= 9.5 // \par \par \par

## 2022-06-20 NOTE — ADDENDUM
[FreeTextEntry1] : Patient's note was transcribed with the assistance of a medical scribe under the supervision of Dr. Polk.\par I, Dr. Polk, have reviewed the patient's chart and agree that it aligns with my medical decisions.\par Deena Riggs, our scribe, also served as a chaperone for physical examination purposes.\par \par \par

## 2022-06-28 ENCOUNTER — NON-APPOINTMENT (OUTPATIENT)
Age: 62
End: 2022-06-28

## 2022-06-28 LAB
APPEARANCE: CLEAR
BACTERIA UR CULT: NORMAL
BILIRUBIN URINE: NEGATIVE
BLOOD URINE: NEGATIVE
COLOR: NORMAL
GLUCOSE QUALITATIVE U: NEGATIVE
KETONES URINE: NEGATIVE
LEUKOCYTE ESTERASE URINE: NEGATIVE
NITRITE URINE: NEGATIVE
PH URINE: 6.5
PROTEIN URINE: NEGATIVE
SPECIFIC GRAVITY URINE: 1.01
UROBILINOGEN URINE: NORMAL

## 2022-08-25 ENCOUNTER — APPOINTMENT (OUTPATIENT)
Dept: PAIN MANAGEMENT | Facility: CLINIC | Age: 62
End: 2022-08-25

## 2022-08-25 VITALS
BODY MASS INDEX: 26.2 KG/M2 | DIASTOLIC BLOOD PRESSURE: 81 MMHG | HEIGHT: 70 IN | WEIGHT: 183 LBS | SYSTOLIC BLOOD PRESSURE: 126 MMHG | HEART RATE: 56 BPM

## 2022-08-25 DIAGNOSIS — M22.2X2 PATELLOFEMORAL DISORDERS, LEFT KNEE: ICD-10-CM

## 2022-08-25 PROCEDURE — 99204 OFFICE O/P NEW MOD 45 MIN: CPT | Mod: 25

## 2022-08-25 PROCEDURE — 20610 DRAIN/INJ JOINT/BURSA W/O US: CPT

## 2022-08-25 NOTE — PHYSICAL EXAM
[de-identified] : Lumbar Spine Exam:\par \par Inspection:\par erythema (-)\par ecchymosis (-)\par rashes (-)\par alignment: no scoliosis, (_)\par \par Palpation:\par Midline lumbar tenderness:             (-)\par midline thoracic tenderness:          (-)\par paraspinal tenderness:                  L (-) ; R (-)\par TTP at the lateral buttock\par \par ROM:  \par Full ROM with mild stiffness. \par \par Strength:\par 5/5 throughout all muscle groups of the lower extremity \par \par Special Tests:\par SLR:                           R (+) ; L (+)\par Facet loading:            R (-) ; L (-)\par MAXINE test:               R (-) ; L (-)\par \par Neurologic:\par Light touch intact throughout LE \par Reflexes normal and symmetric \par \par Gait:\par mildly antalgic gait\par \par

## 2022-08-25 NOTE — REASON FOR VISIT
[Initial Consultation] : an initial pain management consultation [FreeTextEntry2] : Patient presents to office for an evaluation on left knee and hip pain

## 2022-08-25 NOTE — DISCUSSION/SUMMARY
[Medication Risks Reviewed] : Medication risks reviewed [de-identified] : A discussion regarding available pain management treatment options occurred with the patient.  These included interventional, rehabilitative, pharmacological, and alternative modalities. We will proceed with the following:\par \par Interventional treatment options:\par - performed a left knee injection CSI with gelone today under anatomic guidance without complications- done for meniscal pain and patellofemoral pain syndrome\par \par Rehabilitative options:\par - gave the patient physician directed HEP focusing on lumbar radiculopathy and knee pain\par \par Medication based treatment options:\par - can trial motrin and tylenol for pain relief. He deferred mobic today. \par \par Additional treatment recommendations as follows:\par - patient with follow up in 2 weeks\par \par Reviewed his left hip MRI and left knee MRI. Left knee MRI showed some degenerative wear of the meniscus with evidence of patellofemoral defects.\par MRI lumbar NC was ordered for his likely left-sided lumbar radiculopathy \par

## 2022-08-25 NOTE — HISTORY OF PRESENT ILLNESS
[FreeTextEntry1] : 62 year old patient presents with dull, achy left knee pain worse with walking and better with sitting but worse with sitting for long periods of time. He has had this for more than 6 months and has done more than 10 weeks of PT which does help mildly. He occasionally tries NSAIDs and tylenol which does not help him. He is in severe pain in his left knee which impedes his ADLs and everyday activities. He also complains of burning, tingling sharp pain in b/l lower extremities when he lays down and is intermittent in nature. It improves when he is sitting and standing. He does have some left buttock tingling, pain and low back pain associated with it.

## 2022-08-25 NOTE — PROCEDURE
[Large Joint Injection] : Large joint injection [Left] : of the left [Knee] : knee [Pain] : pain [Inflammation] : inflammation [Alcohol] : alcohol [___ cc    0.25%] : Bupivacaine (Marcaine) ~Vcc of 0.25%  [____] : [unfilled] [Gel One] : Gel One [de-identified] : evidence of OA and meniscal tear on MRI knee

## 2022-09-08 ENCOUNTER — APPOINTMENT (OUTPATIENT)
Dept: PAIN MANAGEMENT | Facility: CLINIC | Age: 62
End: 2022-09-08

## 2022-09-08 VITALS
BODY MASS INDEX: 26.2 KG/M2 | SYSTOLIC BLOOD PRESSURE: 123 MMHG | WEIGHT: 183 LBS | DIASTOLIC BLOOD PRESSURE: 68 MMHG | HEIGHT: 70 IN | HEART RATE: 55 BPM

## 2022-09-08 PROCEDURE — 99214 OFFICE O/P EST MOD 30 MIN: CPT

## 2022-09-08 RX ORDER — MELOXICAM 15 MG/1
15 TABLET ORAL DAILY
Qty: 14 | Refills: 0 | Status: ACTIVE | COMMUNITY
Start: 2022-09-08 | End: 1900-01-01

## 2022-09-08 NOTE — REASON FOR VISIT
[Follow-Up Visit] : a follow-up pain management visit [FreeTextEntry2] : follow up hip and knee pain

## 2022-09-08 NOTE — PHYSICAL EXAM
[de-identified] : Lumbar Spine Exam:\par \par Palpation:\par paraspinal tenderness:                  L (-) ; R (-)\par TTP at the lateral buttock\par \par ROM:  \par Full ROM with mild stiffness. \par \par Strength:\par \par Neurologic:\par Light touch intact throughout LE \par Reflexes normal and symmetric \par \par Gait:\par mildly antalgic gait\par \par

## 2022-09-08 NOTE — DISCUSSION/SUMMARY
[Medication Risks Reviewed] : Medication risks reviewed [de-identified] : A discussion regarding available pain management treatment options occurred with the patient.  These included interventional, rehabilitative, pharmacological, and alternative modalities. We will proceed with the following:\par \par Interventional treatment options:\par - pt has 60% relief from left knee injection. Pt continues to have left lumbar radiculopathy symptoms that is impeding his adls and quality of life. \par Discussed option of a left TESI injection to help manage the pain if the pain continues with continued conservative tx\par \par Rehabilitative options:\par - c/w patient physician directed HEP focusing on lumbar radiculopathy \par \par Medication based treatment options:\par - ordered mobic 15 mg daily for 10 days and then stop. Also ordered gabapentin with up titration to 300mg TID\par \par Additional treatment recommendations as follows:\par - patient with follow up for image review\par \par MRI lumbar spine NC is still pending auth approval\par

## 2022-09-08 NOTE — HISTORY OF PRESENT ILLNESS
[FreeTextEntry1] : 62 year old patient presents with dull, achy left knee pain worse with walking and better with sitting but worse with sitting for long periods of time. He has had this for more than 6 months and has done more than 10 weeks of PT which does help mildly. He occasionally tries NSAIDs and tylenol which does not help him. He is in severe pain in his left knee which impedes his ADLs and everyday activities. He also complains of burning, tingling sharp pain in b/l lower extremities when he lays down and is intermittent in nature. It improves when he is sitting and standing. He does have some left buttock tingling, pain and low back pain associated with it. \par \par interval hx 9/8/22:\par \par Patient states his left knee pain is 60% improve currently. He continues to have severe left low back pain tingling, burning, sharp that is radiates down the left posteriolateral leg more than the right leg. The pain is worse when laying down and intermittent in nature, improves with sitting. He denies bowel/bladder incontinence, falls. His ADLs and and quality of life is severely impaired with the back and radicular pain. NSAIDs and tylenol has not help.

## 2022-10-20 ENCOUNTER — APPOINTMENT (OUTPATIENT)
Dept: PAIN MANAGEMENT | Facility: CLINIC | Age: 62
End: 2022-10-20
Payer: MEDICARE

## 2022-10-20 VITALS
HEART RATE: 97 BPM | SYSTOLIC BLOOD PRESSURE: 119 MMHG | HEIGHT: 70 IN | BODY MASS INDEX: 26.2 KG/M2 | DIASTOLIC BLOOD PRESSURE: 87 MMHG | WEIGHT: 183 LBS

## 2022-10-20 PROCEDURE — 20610 DRAIN/INJ JOINT/BURSA W/O US: CPT | Mod: LT

## 2022-10-20 PROCEDURE — 99214 OFFICE O/P EST MOD 30 MIN: CPT | Mod: 25

## 2022-10-20 NOTE — PROCEDURE
[Large Joint Injection] : Large joint injection [Left] : of the left [Knee] : knee [Pain] : pain [Inflammation] : inflammation [Alcohol] : alcohol [___ cc    0.25%] : Bupivacaine (Marcaine) ~Vcc of 0.25%  [___ cc    4mg] : Dexamethasone (Decadron) ~Vcc of 4 mg  [Call if redness, pain or fever occur] : call if redness, pain or fever occur [Apply ice for 15min out of every hour for the next 12-24 hours as tolerated] : apply ice for 15 minutes out of every hour for the next 12-24 hours as tolerated [Previous OTC use and PT nontherapeutic] : patient has tried OTC's including aspirin, Ibuprofen, Aleve, etc or prescription NSAIDS, and/or exercises at home and/or physical therapy without satisfactory response [Patient had decreased mobility in the joint] : patient had decreased mobility in the joint [Risks, benefits, alternatives discussed / Verbal consent obtained] : the risks benefits, and alternatives have been discussed, and verbal consent was obtained

## 2022-10-20 NOTE — PHYSICAL EXAM
[de-identified] : Lumbar Spine Exam:\par \par Palpation:\par paraspinal tenderness:                  L (-) ; R (-)\par TTP at the lateral buttock\par \par ROM:  \par Full ROM with mild stiffness. \par \par Strength:\par \par Neurologic:\par Light touch intact throughout LE \par Reflexes normal and symmetric \par \par Gait:\par mildly antalgic gait\par \par left knee\par TTP to medial and lateral joint\par mild effusion, no erythema, mildly antalgic gait\par \par

## 2022-10-20 NOTE — HISTORY OF PRESENT ILLNESS
[FreeTextEntry1] : 62 year old patient presents with dull, achy left knee pain worse with walking and better with sitting but worse with sitting for long periods of time. He has had this for more than 6 months and has done more than 10 weeks of PT which does help mildly. He occasionally tries NSAIDs and tylenol which does not help him. He is in severe pain in his left knee which impedes his ADLs and everyday activities. He also complains of burning, tingling sharp pain in b/l lower extremities when he lays down and is intermittent in nature. It improves when he is sitting and standing. He does have some left buttock tingling, pain and low back pain associated with it. \par \par interval hx 9/8/22:\par \par Patient states his left knee pain is 60% improve currently. He continues to have severe left low back pain tingling, burning, sharp that is radiates down the left posteriolateral leg more than the right leg. The pain is worse when laying down and intermittent in nature, improves with sitting. He denies bowel/bladder incontinence, falls. His ADLs and and quality of life is severely impaired with the back and radicular pain. NSAIDs and tylenol has not help.\par \par interval hx 10/20/22\par Pt states he had 60% pain relief after his previous knee steroid injection for 5 weeks. He continues to have left low back pain with radicular pain to the anterior hip and posterior thigh and calf that is burning, tingling, stabbing worse with sitting better with standing. He denies bowel/bladder incontinence, weakness, falls, knee buckling. MRI lumbar spine NC has not been approved yet. The radicular pain is impeding his gait, ADLs, and QOL. He has been doing home exercises without relief. Mobic and gabapenitn has not helped his pain.

## 2022-10-20 NOTE — REASON FOR VISIT
[Follow-Up Visit] : a follow-up pain management visit [FreeTextEntry2] : follow up back and knee pain

## 2022-10-20 NOTE — DISCUSSION/SUMMARY
[Medication Risks Reviewed] : Medication risks reviewed [de-identified] : A discussion regarding available pain management treatment options occurred with the patient.  These included interventional, rehabilitative, pharmacological, and alternative modalities. We will proceed with the following:\par \par Interventional treatment options:\par - pt has 60% relief from left knee injection for 5 weeks. \par \par Rehabilitative options:\par - c/w patient physician directed HEP focusing on lumbar radiculopathy \par \par Medication based treatment options:\par - c/w mobic and gabapentin\par \par MRI lumbar spine NC is still pending auth approval\par

## 2022-11-04 ENCOUNTER — OUTPATIENT (OUTPATIENT)
Dept: OUTPATIENT SERVICES | Facility: HOSPITAL | Age: 62
LOS: 1 days | Discharge: HOME | End: 2022-11-04

## 2022-11-04 ENCOUNTER — NON-APPOINTMENT (OUTPATIENT)
Age: 62
End: 2022-11-04

## 2022-11-04 ENCOUNTER — APPOINTMENT (OUTPATIENT)
Dept: GASTROENTEROLOGY | Facility: CLINIC | Age: 62
End: 2022-11-04

## 2022-11-04 VITALS
OXYGEN SATURATION: 99 % | SYSTOLIC BLOOD PRESSURE: 138 MMHG | HEART RATE: 80 BPM | HEIGHT: 70 IN | DIASTOLIC BLOOD PRESSURE: 74 MMHG | WEIGHT: 190 LBS | BODY MASS INDEX: 27.2 KG/M2 | TEMPERATURE: 97 F

## 2022-11-04 DIAGNOSIS — Z98.890 OTHER SPECIFIED POSTPROCEDURAL STATES: Chronic | ICD-10-CM

## 2022-11-04 DIAGNOSIS — Z86.010 PERSONAL HISTORY OF COLONIC POLYPS: ICD-10-CM

## 2022-11-04 DIAGNOSIS — K21.9 GASTRO-ESOPHAGEAL REFLUX DISEASE W/OUT ESOPHAGITIS: ICD-10-CM

## 2022-11-04 DIAGNOSIS — Z78.9 OTHER SPECIFIED HEALTH STATUS: ICD-10-CM

## 2022-11-04 DIAGNOSIS — Z87.19 PERSONAL HISTORY OF OTHER DISEASES OF THE DIGESTIVE SYSTEM: Chronic | ICD-10-CM

## 2022-11-04 PROCEDURE — 99204 OFFICE O/P NEW MOD 45 MIN: CPT | Mod: GC

## 2022-11-04 NOTE — ASSESSMENT
[FreeTextEntry1] : 63 y/o M w/ pMHx of HTN, DLD, CBP and Renal stones presents for initial evaluation for colon cancer screening. Pt does not endorse any new symptoms including change in bowel habits, red blood per rectum or abdominal pain. States he had CF 5 years ago in [place] and unsure of findings. Pt also endorses chronic GERD on setting over being over weight. Denies any dyspepsia, N/v/D/C, fever ,chills, epigasric pain, hematemesis, hx of smokin,g occasional etoh use and no family hx of gastric, colon or pancreatic cancer\par \par #Colon cancer surveillance in average risk patient\par - Last CF 5 years ago. unsure of findings\par \par Plan\par - will order bowel prep\par - will order CBC/BMP/INR\par - will schedule for colonoscopy\par \par #Chronic GERD >5 years \par #Overweight\par \par Plan\par - diet and exercise counselling provided\par - will schedule for EGD\par \par #Isolated elevation of ALT - 48\par - previous liver function test wnl\par \par Plan\par - will repeat LFts and if elevated will perform cld workup including RUQ sono

## 2022-11-04 NOTE — HISTORY OF PRESENT ILLNESS
[FreeTextEntry1] : 61 y/o M w/ pMHx of HTN, DLD, CBP and Renal stones presents for initial evaluation for colon cancer screening. Pt does not endorse any new symptoms including change in bowel habits, red blood per rectum or abdominal pain. States he had CF 5 years ago in [place] and unsure of findings. Pt also endorses chronic GERD on setting over being over weight. Denies any dyspepsia, N/v/D/C, fever ,chills, epigasric pain, hematemesis, hx of smokin,g occasional etoh use and no family hx of gastric, colon or pancreatic cancer

## 2022-11-04 NOTE — END OF VISIT
[] : Fellow [FreeTextEntry3] : Pt presentong for CRC screening adenoma status unclear last colonoscopy reportedly 5 years ago at [facility]. Also endroses chronic GERD failed PPI. WIll perform EGD and colonosocpy

## 2022-11-04 NOTE — PHYSICAL EXAM
[Alert] : alert [Normal Voice/Communication] : normal voice/communication [Healthy Appearing] : healthy appearing [No Acute Distress] : no acute distress [Sclera] : the sclera and conjunctiva were normal [Hearing Threshold Finger Rub Not [place]] : hearing was normal [Normal Lips/Gums] : the lips and gums were normal [Oropharynx] : the oropharynx was normal [Normal Appearance] : the appearance of the neck was normal [No Neck Mass] : no neck mass was observed [No Respiratory Distress] : no respiratory distress [No Acc Muscle Use] : no accessory muscle use [Respiration, Rhythm And Depth] : normal respiratory rhythm and effort [Auscultation Breath Sounds / Voice Sounds] : lungs were clear to auscultation bilaterally [Heart Rate And Rhythm] : heart rate was normal and rhythm regular [Normal S1, S2] : normal S1 and S2 [Murmurs] : no murmurs [Bowel Sounds] : normal bowel sounds [Abdomen Tenderness] : non-tender [No Masses] : no abdominal mass palpated [Abdomen Soft] : soft [] : no hepatosplenomegaly [Oriented To Time, Place, And Person] : oriented to person, place, and time

## 2022-11-09 DIAGNOSIS — K21.9 GASTRO-ESOPHAGEAL REFLUX DISEASE WITHOUT ESOPHAGITIS: ICD-10-CM

## 2022-11-09 DIAGNOSIS — Z86.010 PERSONAL HISTORY OF COLONIC POLYPS: ICD-10-CM

## 2022-11-10 ENCOUNTER — APPOINTMENT (OUTPATIENT)
Dept: PAIN MANAGEMENT | Facility: CLINIC | Age: 62
End: 2022-11-10

## 2022-11-10 VITALS
BODY MASS INDEX: 27.2 KG/M2 | HEIGHT: 70 IN | HEART RATE: 53 BPM | DIASTOLIC BLOOD PRESSURE: 73 MMHG | SYSTOLIC BLOOD PRESSURE: 130 MMHG | WEIGHT: 190 LBS

## 2022-11-10 PROCEDURE — 99214 OFFICE O/P EST MOD 30 MIN: CPT

## 2022-11-10 NOTE — HISTORY OF PRESENT ILLNESS
[FreeTextEntry1] : 62 year old patient presents with dull, achy left knee pain worse with walking and better with sitting but worse with sitting for long periods of time. He has had this for more than 6 months and has done more than 10 weeks of PT which does help mildly. He occasionally tries NSAIDs and tylenol which does not help him. He is in severe pain in his left knee which impedes his ADLs and everyday activities. He also complains of burning, tingling sharp pain in b/l lower extremities when he lays down and is intermittent in nature. It improves when he is sitting and standing. He does have some left buttock tingling, pain and low back pain associated with it. \par \par interval hx 9/8/22:\par \par Patient states his left knee pain is 60% improve currently. He continues to have severe left low back pain tingling, burning, sharp that is radiates down the left posteriolateral leg more than the right leg. The pain is worse when laying down and intermittent in nature, improves with sitting. He denies bowel/bladder incontinence, falls. His ADLs and and quality of life is severely impaired with the back and radicular pain. NSAIDs and tylenol has not help.\par \par interval hx 10/20/22\par Pt states he had 60% pain relief after his previous knee steroid injection for 5 weeks. He continues to have left low back pain with radicular pain to the anterior hip and posterior thigh and calf that is burning, tingling, stabbing worse with sitting better with standing. He denies bowel/bladder incontinence, weakness, falls, knee buckling. MRI lumbar spine NC has not been approved yet. The radicular pain is impeding his gait, ADLs, and QOL. He has been doing home exercises without relief. Mobic and gabapenitn has not helped his pain. \par \par interval hx 11/10/22\par Pt continues to have severe left groin pain with walking and standing, left low back pain that is sharp and burning that radiates down the lateral thigh  and anterior leg with standing and sitting and severe left knee pain. He has 50% relief from his last steroid intraarticular knee injection roughly one month ago. The patient had severe left knee OA on imaging from this year and multilevel disc bulging worse at L4-5 on the left side on MRI lumbar spine from 2022. The patient denies bowel/bladder incontinence, weakness, falls. NSAIDs, gabapentin has not helped. His low back and radicular pain and left hip pain is impeding his gait, ADLs, QOL.

## 2022-11-10 NOTE — PHYSICAL EXAM
[de-identified] : Lumbar Spine Exam:\par \par Palpation:\par paraspinal tenderness:                  L (-) ; R (-)\par TTP at the lateral buttock\par \par ROM:  \par Full ROM with mild stiffness. \par \par Strength:\par \par Neurologic:\par Light touch intact throughout LE \par Reflexes normal and symmetric \par + seated slump on left\par \par Gait:\par  antalgic gait\par \par left knee\par TTP to medial and lateral joint\par mild effusion, no erythema, mildly antalgic gait\par \par pain with IR and ER in the left groin and lateral hip with restricted ROM\par \par

## 2022-11-10 NOTE — DISCUSSION/SUMMARY
[Medication Risks Reviewed] : Medication risks reviewed [de-identified] : A discussion regarding available pain management treatment options occurred with the patient.  These included interventional, rehabilitative, pharmacological, and alternative modalities. We will proceed with the following:\par \par Interventional treatment options:\par - pt has 50% relief to date of left knee injection\par We are going to undergo a diagnostic and potentially therapeutic hip injection under fluoroscopy. The patient understands that if the injection helps for the duration of the local anesthetic then it means that the hip joint is the cause of the pain. Whether or not there is longer pain relief from the corticosteroids will depend on the susceptibility of the underlying pathology to steroids.\par The risks and benefits were discussed and the patient would like to proceed. For left hip\par I explained that we may have to send him for surgical eval by an Orthopedic surgeon for PATRICE on left side. He may knee a surgical consult for his left knee as well at some point. I discussed genicular blocks since his hip is worse and we can hold him over with the knee until the hip is addressed. \par \par \par \par Medication based treatment options:\par deferred for now\par \par MRI lumbar spine NC and left hip imaging was reviewed today as stated in the HPI\par

## 2022-11-16 ENCOUNTER — APPOINTMENT (OUTPATIENT)
Dept: PAIN MANAGEMENT | Facility: CLINIC | Age: 62
End: 2022-11-16

## 2022-11-16 PROCEDURE — 20610 DRAIN/INJ JOINT/BURSA W/O US: CPT | Mod: LT

## 2022-11-16 PROCEDURE — 77002 NEEDLE LOCALIZATION BY XRAY: CPT

## 2022-12-01 ENCOUNTER — APPOINTMENT (OUTPATIENT)
Dept: PAIN MANAGEMENT | Facility: CLINIC | Age: 62
End: 2022-12-01

## 2022-12-01 PROCEDURE — 99214 OFFICE O/P EST MOD 30 MIN: CPT

## 2022-12-01 NOTE — HISTORY OF PRESENT ILLNESS
[FreeTextEntry1] : 62 year old patient presents with dull, achy left knee pain worse with walking and better with sitting but worse with sitting for long periods of time. He has had this for more than 6 months and has done more than 10 weeks of PT which does help mildly. He occasionally tries NSAIDs and tylenol which does not help him. He is in severe pain in his left knee which impedes his ADLs and everyday activities. He also complains of burning, tingling sharp pain in b/l lower extremities when he lays down and is intermittent in nature. It improves when he is sitting and standing. He does have some left buttock tingling, pain and low back pain associated with it. \par \par interval hx 9/8/22:\par \par Patient states his left knee pain is 60% improve currently. He continues to have severe left low back pain tingling, burning, sharp that is radiates down the left posteriolateral leg more than the right leg. The pain is worse when laying down and intermittent in nature, improves with sitting. He denies bowel/bladder incontinence, falls. His ADLs and and quality of life is severely impaired with the back and radicular pain. NSAIDs and tylenol has not help.\par \par interval hx 10/20/22\par Pt states he had 60% pain relief after his previous knee steroid injection for 5 weeks. He continues to have left low back pain with radicular pain to the anterior hip and posterior thigh and calf that is burning, tingling, stabbing worse with sitting better with standing. He denies bowel/bladder incontinence, weakness, falls, knee buckling. MRI lumbar spine NC has not been approved yet. The radicular pain is impeding his gait, ADLs, and QOL. He has been doing home exercises without relief. Mobic and gabapenitn has not helped his pain. \par \par interval hx 11/10/22\par Pt continues to have severe left groin pain with walking and standing, left low back pain that is sharp and burning that radiates down the lateral thigh  and anterior leg with standing and sitting and severe left knee pain. He has 50% relief from his last steroid intraarticular knee injection roughly one month ago. The patient had severe left knee OA on imaging from this year and multilevel disc bulging worse at L4-5 on the left side on MRI lumbar spine from 2022. The patient denies bowel/bladder incontinence, weakness, falls. NSAIDs, gabapentin has not helped. His low back and radicular pain and left hip pain is impeding his gait, ADLs, QOL. \par \par interval hx 12/1/2022\par Pt presents with 40% pain relief to the left hip joint and is able to walk and stand more with less left groin pain. The patient continues to have left lateral hip pain, low back pain that radiates to the lateral thigh and anterior leg worse with sitting, laying flat. He denies bowel/bladder incontinence, weakness, falls. The pain is sharp and dull at times. the radicular pain is a 9/10 at its worst and sometimes impedes ADLs.

## 2022-12-01 NOTE — PROCEDURE
[FreeTextEntry3] : \par Date of Service: 11/16/2022 \par \par Account: [id number]\par \par Patient: ANGLE KING \par \par YOB: 1960\par \par Age: 62 year\par \par Surgeon:      Vincent Reis DO\par \par Pre-Operative Diagnosis:        Left Primary Osteoarthritis of Hip (M16.0)\par \par Post-Operative Diagnosis:       Same\par \par Procedure:             LEFTl Hip arthrogram and steroid injection under fluoroscopic guidance. \par \par \par This procedure was carried out using fluoroscopic guidance.  The risks and benefits of the procedure were discussed extensively with the patient.  The consent of the patient was obtained and the following procedure was performed. The patient was placed in the supine position on the fluoroscopic table and the area was prepped and draped in a sterile fashion.  A timeout was performed with all essential staff present and the site and side were verified.\par \par The patient was placed in the supine position with right hip flexed and externally rotated 25 degrees.  The area of the right groin was prepped and draped in a sterile fashion.  The fluoroscopic image intensifier was then positioned so that the right hip appeared in view, and the midline intertrochanteric region was identified and marked.  A 25 gauge spinal needle was then inserted and directed into this right hip intra-capsular region.  After negative aspiration for heme and CSF, 3 cc of Omnipaque was injected and appeared to fill the joint margins.\par \par Right hip arthrogram showed no intravascular flow, and good spread around the femoral head and to the acetabulum. An injectate of 3 cc 0.25% Marcaine plus 10 mg of Dexamethasone was then injected into the right hip space. \par \par The patient was placed in the supine position with left hip flexed and externally rotated 25 degrees.  The area of the left groin was prepped and draped in a sterile fashion.  The fluoroscopic image intensifier was then positioned so that the left hip appeared in view, and the midline intertrochanteric region was identified and marked.  A 25 gauge spinal needle was then inserted and directed into the left hip intra-capsular region.  After negative aspiration for heme and CSF, 3 cc of Omnipaque was injected and appeared to fill the joint margins.\par \par Left hip arthrogram showed no intravascular flow, and good spread around the femoral head and to the acetabulum. An injectate of 3 cc 0.25% marcaine plus 10 mg of Dexamethasone was then injected into the left hip space.\par \par The needle was subsequently removed.  Vital signs remained normal.  Pulse oximeter was used throughout the procedure and the patient's pulse and oxygen saturation remained within normal limits.  The patient tolerated the procedure well.  There were no complications.  The patient was instructed to apply ice over the injection sites for twenty minutes every two hours for the next 24 to 48 hours.\par \par Disposition:\par \par      1. The patient was advised to F/U in 1-2 weeks to assess the response to the injection.\par      2. The patient was also instructed to contact me immediately if there were any concerns related to the procedure performed.\par \par Vincent Reis DO\par

## 2022-12-01 NOTE — PHYSICAL EXAM
[de-identified] : Lumbar Spine Exam:\par \par Palpation:\par paraspinal tenderness:                  L (+) ; R (-)\par TTP at the lateral buttock\par \par ROM:  \par Full ROM with mild stiffness. \par \par Strength:\par \par Neurologic:\par Reflexes normal and symmetric \par + seated slump on left\par \par Gait:\par  antalgic gait\par \par left knee\par TTP to medial and lateral joint\par mild effusion, no erythema, mildly antalgic gait\par \par much less pain with IR and ER in the left groin and lateral hip with restricted ROM\par \par

## 2022-12-01 NOTE — DISCUSSION/SUMMARY
[Medication Risks Reviewed] : Medication risks reviewed [de-identified] : A discussion regarding available pain management treatment options occurred with the patient.  These included interventional, rehabilitative, pharmacological, and alternative modalities. We will proceed with the following:\par \par Interventional treatment options:\par pt has 40% relief after his left hip injection for 2 weeks thus far. Pt has 10% relief to date after his left knee injection after 1 month.\par Treatment options were discussed with the patient. The patient has been having persistent lower back and lumbar radicular pain with minimal improvement with conservative therapies. Given that the patient has severe pain and failed conservative treatment, the patient was given the option to proceed with a lumbar epidural steroid injection to try to get some pain relief.  \par \par The risks and benefits were discussed which included bleeding, infection, nerve injury, no pain relief or worse, increased pain. All questions were answered and concerns addressed.\par L3-4 LESI\par \par Medication based treatment options:\par deferred for now\par \par f/u for LESI\par

## 2022-12-02 ENCOUNTER — APPOINTMENT (OUTPATIENT)
Dept: PAIN MANAGEMENT | Facility: CLINIC | Age: 62
End: 2022-12-02

## 2022-12-02 PROCEDURE — 62323 NJX INTERLAMINAR LMBR/SAC: CPT

## 2022-12-02 NOTE — PROCEDURE
[FreeTextEntry3] : Date of Service: 12/02/2022 \par \par Account: [id number]\par \par Patient: ANGLE KING \par \par YOB: 1960\par \par Age: 62 year\par \par Surgeon:     Vincent Reis DO\par \par Assistant:    None\par \par Pre-Operative Diagnosis:         Lumbosacral Radiculitis (M54.17)\par \par Post Operative Diagnosis:       Lumbosacral Radiculitis (M54.17)   \par \par Procedure:             Lumbar interlaminar (L4-5) epidural steroid injection under fluoroscopic guidance\par \par Anesthesia:           none\par \par This procedure was carried out using fluoroscopic guidance.  The risks and benefits of the procedure were discussed extensively with the patient.  The consent of the patient was obtained and the following procedure was performed. The patient was placed in the prone position on the fluoroscopy table and the area was prepped and draped in a sterile fashion.  A timeout was performed with all essential staff present and the site and side were verified.\par \par The patient was placed in the prone position with a pillow under the abdomen to minimize the lumbar lordosis.  The lumbar area was prepped and draped in a sterile fashion.  Under A/P view with slight cephalad-caudad angulation, the L4-5 interspace was identified and marked.  Using sterile technique the superficial skin was anesthetized with 1% Lidocaine.  A 20 gauge Tuohy needle was advanced into the epidural space under fluoroscopy using loss of resistance at the L4-5 level.  After negative aspiration for heme or CSF, an epidurogram was obtained in the A/P fluoroscopic views using 2-3 cc of Omnipaque contrast confirming epidural placement of the needle.  After this, 5 cc of of a mixture of preservative free normal saline and 10mg dexamethasone were injected into the epidural space. \par \par The needle was subsequently removed.  Vital signs remained normal.  Pulse oximeter was used throughout the procedure and the patient's pulse and oxygen saturation remained within normal limits.  The patient tolerated the procedure well.  There were no complications.  The patient was instructed to apply ice over the injection sites for twenty minutes every two hours for the next 24 to 48 hours.\par \par Disposition:\par \par      1. The patient was advised to F/U in 1-2 weeks to assess the response to the injection.\par      2. The patient was also instructed to contact me immediately if there were any concerns related to the procedure performed.\par

## 2022-12-15 ENCOUNTER — APPOINTMENT (OUTPATIENT)
Dept: PAIN MANAGEMENT | Facility: CLINIC | Age: 62
End: 2022-12-15

## 2022-12-15 VITALS
BODY MASS INDEX: 27.92 KG/M2 | DIASTOLIC BLOOD PRESSURE: 82 MMHG | HEART RATE: 57 BPM | WEIGHT: 195 LBS | SYSTOLIC BLOOD PRESSURE: 126 MMHG | HEIGHT: 70 IN

## 2022-12-15 PROCEDURE — 99213 OFFICE O/P EST LOW 20 MIN: CPT

## 2022-12-15 NOTE — HISTORY OF PRESENT ILLNESS
[FreeTextEntry1] : 62 year old patient presents with dull, achy left knee pain worse with walking and better with sitting but worse with sitting for long periods of time. He has had this for more than 6 months and has done more than 10 weeks of PT which does help mildly. He occasionally tries NSAIDs and tylenol which does not help him. He is in severe pain in his left knee which impedes his ADLs and everyday activities. He also complains of burning, tingling sharp pain in b/l lower extremities when he lays down and is intermittent in nature. It improves when he is sitting and standing. He does have some left buttock tingling, pain and low back pain associated with it. \par \par interval hx 9/8/22:\par \par Patient states his left knee pain is 60% improve currently. He continues to have severe left low back pain tingling, burning, sharp that is radiates down the left posteriolateral leg more than the right leg. The pain is worse when laying down and intermittent in nature, improves with sitting. He denies bowel/bladder incontinence, falls. His ADLs and and quality of life is severely impaired with the back and radicular pain. NSAIDs and tylenol has not help.\par \par interval hx 10/20/22\par Pt states he had 60% pain relief after his previous knee steroid injection for 5 weeks. He continues to have left low back pain with radicular pain to the anterior hip and posterior thigh and calf that is burning, tingling, stabbing worse with sitting better with standing. He denies bowel/bladder incontinence, weakness, falls, knee buckling. MRI lumbar spine NC has not been approved yet. The radicular pain is impeding his gait, ADLs, and QOL. He has been doing home exercises without relief. Mobic and gabapenitn has not helped his pain. \par \par interval hx 11/10/22\par Pt continues to have severe left groin pain with walking and standing, left low back pain that is sharp and burning that radiates down the lateral thigh  and anterior leg with standing and sitting and severe left knee pain. He has 50% relief from his last steroid intraarticular knee injection roughly one month ago. The patient had severe left knee OA on imaging from this year and multilevel disc bulging worse at L4-5 on the left side on MRI lumbar spine from 2022. The patient denies bowel/bladder incontinence, weakness, falls. NSAIDs, gabapentin has not helped. His low back and radicular pain and left hip pain is impeding his gait, ADLs, QOL. \par \par interval hx 12/1/2022\par Pt presents with 40% pain relief to the left hip joint and is able to walk and stand more with less left groin pain. The patient continues to have left lateral hip pain, low back pain that radiates to the lateral thigh and anterior leg worse with sitting, laying flat. He denies bowel/bladder incontinence, weakness, falls. The pain is sharp and dull at times. the radicular pain is a 9/10 at its worst and sometimes impedes ADLs. \par \par interval hx 12/15/22\par Pt has 50% burning pain relief in his anterior legs worse when laying down at night after his LESI. He continues to have left knee pain from his OA and left groin pain from his hip OA. He denies bowel/bladder incontinence, weakness, falls, numbness. Left Groin and knee pain is worse with standing and walking and better with sitting. He takes gabapentin at bedtime for burning pain and mobic when overall pain is severe.

## 2022-12-15 NOTE — DISCUSSION/SUMMARY
[Medication Risks Reviewed] : Medication risks reviewed [de-identified] : A discussion regarding available pain management treatment options occurred with the patient.  These included interventional, rehabilitative, pharmacological, and alternative modalities. We will proceed with the following:\par \par Interventional treatment options:\par pt has 50% pain relief of his anterior leg burning pain after the LESI. \par \par Medication based treatment options:\par deferred for now. Has mobic at home\par \par f/u in the new year\par

## 2022-12-15 NOTE — PHYSICAL EXAM
[de-identified] : Lumbar Spine Exam:\par \par ROM:  \par Full ROM with mild stiffness. \par \par full strength in b/l LE\par \par Gait:\par  antalgic gait\par \par left knee\par TTP to medial and lateral joint\par mild effusion, no erythema, mildly antalgic gait\par \par much less pain with IR and ER in the left groin and lateral hip with restricted ROM\par \par

## 2023-01-03 ENCOUNTER — TRANSCRIPTION ENCOUNTER (OUTPATIENT)
Age: 63
End: 2023-01-03

## 2023-01-03 ENCOUNTER — RESULT REVIEW (OUTPATIENT)
Age: 63
End: 2023-01-03

## 2023-01-03 ENCOUNTER — OUTPATIENT (OUTPATIENT)
Dept: OUTPATIENT SERVICES | Facility: HOSPITAL | Age: 63
LOS: 1 days | Discharge: HOME | End: 2023-01-03
Payer: MEDICARE

## 2023-01-03 VITALS
RESPIRATION RATE: 20 BRPM | WEIGHT: 184.97 LBS | HEIGHT: 70 IN | TEMPERATURE: 98 F | SYSTOLIC BLOOD PRESSURE: 137 MMHG | DIASTOLIC BLOOD PRESSURE: 69 MMHG | HEART RATE: 86 BPM

## 2023-01-03 VITALS
DIASTOLIC BLOOD PRESSURE: 76 MMHG | RESPIRATION RATE: 15 BRPM | OXYGEN SATURATION: 100 % | HEART RATE: 76 BPM | SYSTOLIC BLOOD PRESSURE: 136 MMHG

## 2023-01-03 DIAGNOSIS — Z98.890 OTHER SPECIFIED POSTPROCEDURAL STATES: Chronic | ICD-10-CM

## 2023-01-03 DIAGNOSIS — Z87.19 PERSONAL HISTORY OF OTHER DISEASES OF THE DIGESTIVE SYSTEM: Chronic | ICD-10-CM

## 2023-01-03 PROCEDURE — 45380 COLONOSCOPY AND BIOPSY: CPT

## 2023-01-03 PROCEDURE — 43239 EGD BIOPSY SINGLE/MULTIPLE: CPT | Mod: XU

## 2023-01-03 PROCEDURE — 88305 TISSUE EXAM BY PATHOLOGIST: CPT | Mod: 26

## 2023-01-03 PROCEDURE — 88312 SPECIAL STAINS GROUP 1: CPT | Mod: 26

## 2023-01-03 RX ORDER — PANTOPRAZOLE SODIUM 20 MG/1
1 TABLET, DELAYED RELEASE ORAL
Qty: 120 | Refills: 0
Start: 2023-01-03 | End: 2023-03-03

## 2023-01-03 RX ORDER — HYDROMORPHONE HYDROCHLORIDE 2 MG/ML
0.5 INJECTION INTRAMUSCULAR; INTRAVENOUS; SUBCUTANEOUS
Refills: 0 | Status: DISCONTINUED | OUTPATIENT
Start: 2023-01-03 | End: 2023-01-03

## 2023-01-03 RX ORDER — SODIUM CHLORIDE 9 MG/ML
1000 INJECTION, SOLUTION INTRAVENOUS
Refills: 0 | Status: DISCONTINUED | OUTPATIENT
Start: 2023-01-03 | End: 2023-01-17

## 2023-01-03 NOTE — ASU DISCHARGE PLAN (ADULT/PEDIATRIC) - ASU DC SPECIAL INSTRUCTIONSFT
- Follow up with our [facility] located at [address] [place] [address]. Phone Number: [phone]

## 2023-01-03 NOTE — H&P PST ADULT - HISTORY OF PRESENT ILLNESS
63 y/o M w/ pMHx of HTN, DLD, CBP and Renal stones presents for initial evaluation for colon cancer screening and chronic gerd here for egd/colonsocopy

## 2023-01-03 NOTE — CHART NOTE - NSCHARTNOTEFT_GEN_A_CORE
PACU ANESTHESIA ADMISSION NOTE      Procedure: EGD, COLONOSCOPY   Post op diagnosis:  POLPYS        __x__  Patent Airway    __x__  Full return of protective reflexes    _x___  Full recovery from anesthesia / back to baseline     Vitals:   T:    N/A       R:      12            BP:    102/68              Sat:    100%               P: 80      Mental Status:  ___x_ Awake   __x___ Alert   _____ Drowsy   _____ Sedated    Nausea/Vomiting:  ____ NO  ______Yes,   x   See Post - Op Orders          Pain Scale (0-10):  _____    Treatment: ____ None    _x___ See Post - Op/PCA Orders    Post - Operative Fluids:   ____ Oral   ___x_ See Post - Op Orders    Plan: Discharge:   ___x_Home       _____Floor     _____Critical Care    _____  Other:_________________    Comments:    Pt tolerated procedure well, no anesthesia related complications. Care of pt endorsed to PACU, report given to PACU RN. Discharge when criteria are met.

## 2023-01-04 NOTE — ASU PATIENT PROFILE, ADULT - NS PRO TALK SOMEONE YN
[] : results reviewed [de-identified] : CBC 11/23/22 all ok--per patient surgeon ok with 11/2022 labwork [de-identified] : BMP 11/23/22 all ok--per patient surgeon ok with 11/2022 labwork [de-identified] : 11/23/22 EKG NSR 64 bpm, no ST-T wave abnormalities. no

## 2023-01-05 LAB
SURGICAL PATHOLOGY STUDY: SIGNIFICANT CHANGE UP
SURGICAL PATHOLOGY STUDY: SIGNIFICANT CHANGE UP

## 2023-01-06 DIAGNOSIS — K57.30 DIVERTICULOSIS OF LARGE INTESTINE WITHOUT PERFORATION OR ABSCESS WITHOUT BLEEDING: ICD-10-CM

## 2023-01-06 DIAGNOSIS — Z12.11 ENCOUNTER FOR SCREENING FOR MALIGNANT NEOPLASM OF COLON: ICD-10-CM

## 2023-01-06 DIAGNOSIS — K63.5 POLYP OF COLON: ICD-10-CM

## 2023-01-06 DIAGNOSIS — I10 ESSENTIAL (PRIMARY) HYPERTENSION: ICD-10-CM

## 2023-01-06 DIAGNOSIS — K64.8 OTHER HEMORRHOIDS: ICD-10-CM

## 2023-01-06 DIAGNOSIS — K25.3 ACUTE GASTRIC ULCER WITHOUT HEMORRHAGE OR PERFORATION: ICD-10-CM

## 2023-01-06 DIAGNOSIS — E78.00 PURE HYPERCHOLESTEROLEMIA, UNSPECIFIED: ICD-10-CM

## 2023-01-06 DIAGNOSIS — K62.1 RECTAL POLYP: ICD-10-CM

## 2023-01-06 DIAGNOSIS — K44.9 DIAPHRAGMATIC HERNIA WITHOUT OBSTRUCTION OR GANGRENE: ICD-10-CM

## 2023-01-06 DIAGNOSIS — K20.90 ESOPHAGITIS, UNSPECIFIED WITHOUT BLEEDING: ICD-10-CM

## 2023-01-06 DIAGNOSIS — F32.9 MAJOR DEPRESSIVE DISORDER, SINGLE EPISODE, UNSPECIFIED: ICD-10-CM

## 2023-01-12 ENCOUNTER — APPOINTMENT (OUTPATIENT)
Dept: PAIN MANAGEMENT | Facility: CLINIC | Age: 63
End: 2023-01-12
Payer: MEDICARE

## 2023-01-12 VITALS — WEIGHT: 195 LBS | BODY MASS INDEX: 27.92 KG/M2 | HEIGHT: 70 IN

## 2023-01-12 PROCEDURE — 99214 OFFICE O/P EST MOD 30 MIN: CPT

## 2023-01-13 ENCOUNTER — APPOINTMENT (OUTPATIENT)
Dept: PAIN MANAGEMENT | Facility: CLINIC | Age: 63
End: 2023-01-13
Payer: MEDICARE

## 2023-01-13 PROCEDURE — 62323 NJX INTERLAMINAR LMBR/SAC: CPT

## 2023-01-13 RX ORDER — GABAPENTIN 300 MG/1
300 CAPSULE ORAL 3 TIMES DAILY
Qty: 90 | Refills: 0 | Status: ACTIVE | COMMUNITY
Start: 2022-09-08 | End: 1900-01-01

## 2023-01-13 NOTE — PROCEDURE
[FreeTextEntry3] : Date of Service: 01/13/2023 \par \par Account: [id number]\par \par Patient: ANGLE KING \par \par YOB: 1960\par \par Age: 62 year\par \par Surgeon:      Vincent Reis DO\par \par Assistant:    None\par \par Pre-Operative Diagnosis:         Lumbosacral Radiculopathy (M54.16)\par \par Post Operative Diagnosis:       Lumbosacral Radiculopathy (M54.16)   \par \par Procedure:             Lumbar interlaminar (L4-5) epidural steroid injection under fluoroscopic guidance\par \par Anesthesia:           none\par \par This procedure was carried out using fluoroscopic guidance.  The risks and benefits of the procedure were discussed extensively with the patient.  The consent of the patient was obtained and the following procedure was performed. The patient was placed in the prone position on the fluoroscopy table and the area was prepped and draped in a sterile fashion.  A timeout was performed with all essential staff present and the site and side were verified.\par \par The patient was placed in the prone position with a pillow under the abdomen to minimize the lumbar lordosis.  The lumbar area was prepped and draped in a sterile fashion.  Under A/P view with slight cephalad-caudad angulation, the L4-5 interspace was identified and marked.  Using sterile technique the superficial skin was anesthetized with 1% Lidocaine.  A 20 gauge Tuohy needle was advanced into the epidural space under fluoroscopy using loss of resistance at the L4-5 level.  After negative aspiration for heme or CSF, an epidurogram was obtained in the A/P fluoroscopic views using 2-3 cc of Omnipaque contrast confirming epidural placement of the needle.  After this, 5 cc of of a mixture of preservative free normal saline and 10 mg dexamethasone were injected into the epidural space. \par \par The needle was subsequently removed.  Vital signs remained normal.  Pulse oximeter was used throughout the procedure and the patient's pulse and oxygen saturation remained within normal limits.  The patient tolerated the procedure well.  There were no complications.  The patient was instructed to apply ice over the injection sites for twenty minutes every two hours for the next 24 to 48 hours.\par \par Disposition:\par \par      1. The patient was advised to F/U in 1-2 weeks to assess the response to the injection.\par      2. The patient was also instructed to contact me immediately if there were any concerns related to the procedure performed.\par

## 2023-01-14 NOTE — PHYSICAL EXAM
[de-identified] : Lumbar Spine Exam:\par \par ROM:  \par Full ROM with mild stiffness. \par \par Gait:\par  antalgic gait\par \par left knee\par TTP to medial and lateral joint\par mild effusion, no erythema, mildly antalgic gait\par \par pain with IR and ER in the left groin and lateral hip with restricted ROM\par \par +seated slump b/l\par \par

## 2023-01-14 NOTE — HISTORY OF PRESENT ILLNESS
[FreeTextEntry1] : 62 year old patient presents with dull, achy left knee pain worse with walking and better with sitting but worse with sitting for long periods of time. He has had this for more than 6 months and has done more than 10 weeks of PT which does help mildly. He occasionally tries NSAIDs and tylenol which does not help him. He is in severe pain in his left knee which impedes his ADLs and everyday activities. He also complains of burning, tingling sharp pain in b/l lower extremities when he lays down and is intermittent in nature. It improves when he is sitting and standing. He does have some left buttock tingling, pain and low back pain associated with it. \par \par interval hx 9/8/22:\par \par Patient states his left knee pain is 60% improve currently. He continues to have severe left low back pain tingling, burning, sharp that is radiates down the left posteriolateral leg more than the right leg. The pain is worse when laying down and intermittent in nature, improves with sitting. He denies bowel/bladder incontinence, falls. His ADLs and and quality of life is severely impaired with the back and radicular pain. NSAIDs and tylenol has not help.\par \par interval hx 10/20/22\par Pt states he had 60% pain relief after his previous knee steroid injection for 5 weeks. He continues to have left low back pain with radicular pain to the anterior hip and posterior thigh and calf that is burning, tingling, stabbing worse with sitting better with standing. He denies bowel/bladder incontinence, weakness, falls, knee buckling. MRI lumbar spine NC has not been approved yet. The radicular pain is impeding his gait, ADLs, and QOL. He has been doing home exercises without relief. Mobic and gabapenitn has not helped his pain. \par \par interval hx 11/10/22\par Pt continues to have severe left groin pain with walking and standing, left low back pain that is sharp and burning that radiates down the lateral thigh  and anterior leg with standing and sitting and severe left knee pain. He has 50% relief from his last steroid intraarticular knee injection roughly one month ago. The patient had severe left knee OA on imaging from this year and multilevel disc bulging worse at L4-5 on the left side on MRI lumbar spine from 2022. The patient denies bowel/bladder incontinence, weakness, falls. NSAIDs, gabapentin has not helped. His low back and radicular pain and left hip pain is impeding his gait, ADLs, QOL. \par \par interval hx 12/1/2022\par Pt presents with 40% pain relief to the left hip joint and is able to walk and stand more with less left groin pain. The patient continues to have left lateral hip pain, low back pain that radiates to the lateral thigh and anterior leg worse with sitting, laying flat. He denies bowel/bladder incontinence, weakness, falls. The pain is sharp and dull at times. the radicular pain is a 9/10 at its worst and sometimes impedes ADLs. \par \par interval hx 12/15/22\par Pt has 50% burning pain relief in his anterior legs worse when laying down at night after his LESI. He continues to have left knee pain from his OA and left groin pain from his hip OA. He denies bowel/bladder incontinence, weakness, falls, numbness. Left Groin and knee pain is worse with standing and walking and better with sitting. He takes gabapentin at bedtime for burning pain and mobic when overall pain is severe.\par \par interval hx 1/12/23\par Pt continues to have b/l low back pain with burning down his thighs into his anterior legs worse with sitting and laying down. He also has left knee pain that is worse with standing and walking and better with sitting which is dull, achy in nature, 10/10 in pain intensity which causes him to limp and also cause pain in his low back. He denies bowel, bladder incontinence, weakness, falls. The radicular pain occurs daily and is 7/10 in intensity.

## 2023-01-14 NOTE — DISCUSSION/SUMMARY
[de-identified] : A discussion regarding available pain management treatment options occurred with the patient.  These included interventional, rehabilitative, pharmacological, and alternative modalities. We will proceed with the following:\par \par Interventional treatment options:\par Treatment options were discussed with the patient. The patient has been having persistent lower back and lumbar radicular pain with minimal improvement with conservative therapies. Given that the patient has severe pain and failed conservative treatment, the patient was given the option to proceed with a lumbar epidural steroid injection to try to get some pain relief.  \par \par The risks and benefits were discussed which included bleeding, infection, nerve injury, no pain relief or worse, increased pain. All questions were answered and concerns addressed.\par \par I also want to try a genicular nerve block on the left side because the intra-articular knee injections has short term relief. \par                                                        \par Medication based treatment options:\par pt does not want to take any more NSAIDs\par \par f/u for the LESI\par \par \par

## 2023-01-20 ENCOUNTER — APPOINTMENT (OUTPATIENT)
Dept: PAIN MANAGEMENT | Facility: CLINIC | Age: 63
End: 2023-01-20
Payer: MEDICARE

## 2023-01-20 PROCEDURE — 64454 NJX AA&/STRD GNCLR NRV BRNCH: CPT | Mod: LT

## 2023-01-21 NOTE — PROCEDURE
[FreeTextEntry3] : Date of Service: 01/21/2023 \par \par Account: [id number]\par \par Patient: ANGLE KING \par \par YOB: 1960\par \par Age: 62 year\par \par Surgeon:  Vincent Reis DO\par \par Assistant: None\par \par Pre-Operative Diagnosis: \par 1) Chronic left knee pain\par 2) Left knee osteoarthritis\par \par Post Operative Diagnosis:\par 1) Chronic left knee pain\par 2) left knee osteoarthritis\par \par Procedure:\par 1) left superior medial genicular nerve block with fluoroscopic guidance\par 2 left superior lateral genicular nerve block with fluoroscopic guidance\par 3) left inferomedial genicular nerve block with fluoroscopic guidance\par  \par Anesthesia:  none\par \par This procedure was carried out using fluoroscopic guidance.  The risks and benefits of the procedure were discussed extensively with the patient.  The consent of the patient was obtained and the following procedure was performed.  The patient was placed in the supine position on the fluoroscopy table with the left and right knee flexed to approximately 60 degrees and the area was prepped and draped in a sterile fashion.  A timeout was performed with all essential staff present and the site and side were verified.\par  \par  Then a 3.5 inch 25 gauge spinal needle was advanced under fluoroscopic guidance to medial flare of the femur metaphysis/diaphysis junction on the left side.  The needle position was confirmed in A/P and  lateral views.  Then 1 ml of 0.5% Bupivacaine was injected after negative aspiration at the left  superior medial genicular branch.   This was repeated in the exact same fashion at the left superior lateral genicular branch and inferior medial genicular branch.\par  \par The needles were subsequently removed.  Vital signs remained normal.  Pulse oximeter was used throughout the procedure and the patient's pulse and oxygen saturation remained within normal limits.  The patient tolerated the procedure well.  There were no complications.  The patient was instructed to apply ice over the injection sites for twenty minutes every two hours for the next 24 to 48 hours.\par  \par Disposition:\par 1. The patient was advised to F/U in 1-2 weeks to assess the response to the injection.\par 2. The patient was also instructed to contact me immediately if there were any concerns related to the procedure performed.\par \par \par Vincent Reis, \par

## 2023-02-02 ENCOUNTER — APPOINTMENT (OUTPATIENT)
Dept: PAIN MANAGEMENT | Facility: CLINIC | Age: 63
End: 2023-02-02
Payer: MEDICARE

## 2023-02-02 PROCEDURE — 99214 OFFICE O/P EST MOD 30 MIN: CPT

## 2023-02-02 RX ORDER — DICLOFENAC SODIUM 1% 10 MG/G
1 GEL TOPICAL DAILY
Qty: 1 | Refills: 0 | Status: ACTIVE | COMMUNITY
Start: 2023-02-02 | End: 1900-01-01

## 2023-02-02 NOTE — HISTORY OF PRESENT ILLNESS
[FreeTextEntry1] : 62 year old patient presents with dull, achy left knee pain worse with walking and better with sitting but worse with sitting for long periods of time. He has had this for more than 6 months and has done more than 10 weeks of PT which does help mildly. He occasionally tries NSAIDs and tylenol which does not help him. He is in severe pain in his left knee which impedes his ADLs and everyday activities. He also complains of burning, tingling sharp pain in b/l lower extremities when he lays down and is intermittent in nature. It improves when he is sitting and standing. He does have some left buttock tingling, pain and low back pain associated with it. \par \par interval hx 9/8/22:\par \par Patient states his left knee pain is 60% improve currently. He continues to have severe left low back pain tingling, burning, sharp that is radiates down the left posteriolateral leg more than the right leg. The pain is worse when laying down and intermittent in nature, improves with sitting. He denies bowel/bladder incontinence, falls. His ADLs and and quality of life is severely impaired with the back and radicular pain. NSAIDs and tylenol has not help.\par \par interval hx 10/20/22\par Pt states he had 60% pain relief after his previous knee steroid injection for 5 weeks. He continues to have left low back pain with radicular pain to the anterior hip and posterior thigh and calf that is burning, tingling, stabbing worse with sitting better with standing. He denies bowel/bladder incontinence, weakness, falls, knee buckling. MRI lumbar spine NC has not been approved yet. The radicular pain is impeding his gait, ADLs, and QOL. He has been doing home exercises without relief. Mobic and gabapenitn has not helped his pain. \par \par interval hx 11/10/22\par Pt continues to have severe left groin pain with walking and standing, left low back pain that is sharp and burning that radiates down the lateral thigh and anterior leg with standing and sitting and severe left knee pain. He has 50% relief from his last steroid intraarticular knee injection roughly one month ago. The patient had severe left knee OA on imaging from this year and multilevel disc bulging worse at L4-5 on the left side on MRI lumbar spine from 2022. The patient denies bowel/bladder incontinence, weakness, falls. NSAIDs, gabapentin has not helped. His low back and radicular pain and left hip pain is impeding his gait, ADLs, QOL. \par \par interval hx 12/1/2022\par Pt presents with 40% pain relief to the left hip joint and is able to walk and stand more with less left groin pain. The patient continues to have left lateral hip pain, low back pain that radiates to the lateral thigh and anterior leg worse with sitting, laying flat. He denies bowel/bladder incontinence, weakness, falls. The pain is sharp and dull at times. the radicular pain is a 9/10 at its worst and sometimes impedes ADLs. \par \par interval hx 12/15/22\par Pt has 50% burning pain relief in his anterior legs worse when laying down at night after his LESI. He continues to have left knee pain from his OA and left groin pain from his hip OA. He denies bowel/bladder incontinence, weakness, falls, numbness. Left Groin and knee pain is worse with standing and walking and better with sitting. He takes gabapentin at bedtime for burning pain and mobic when overall pain is severe.\par \par interval hx 1/12/23\par Pt continues to have b/l low back pain with burning down his thighs into his anterior legs worse with sitting and laying down. He also has left knee pain that is worse with standing and walking and better with sitting which is dull, achy in nature, 10/10 in pain intensity which causes him to limp and also cause pain in his low back. He denies bowel, bladder incontinence, weakness, falls. The radicular pain occurs daily and is 7/10 in intensity. \par \par interval hx 2/2/23\par Patient presents today for follow-up. He notes he has had 60-70% relief s/p recent injection treatment. He is s/p left genicular knee injection, and an L4-5 LESI, 01/12/23 and 01/13/23. He notes good relief of about 60-70%, though sitting for long periods of time and walking up stairs increases the pain still. He does still have persistent hip pain. He is s/p left hip injection 11/16/22 with relief for 1.5 months. His hip pain has since returned.

## 2023-02-02 NOTE — PHYSICAL EXAM
[de-identified] : \par \par left knee\par TTP to medial and lateral joint\par mild effusion, no erythema, mildly antalgic gait\par \par pain with IR and ER in the left groin and lateral hip with restricted ROM\par

## 2023-02-02 NOTE — DISCUSSION/SUMMARY
[de-identified] : A discussion regarding available pain management treatment options occurred with the patient. These included interventional, rehabilitative, pharmacological, and alternative modalities. We will proceed with the following: \par \par Interventional treatment options:\par Treatment options were discussed with the patient. He has had about 60-70% relief s/p left genicular knee injection. He continues to have left hip pain. I am sending out authorization for a left hip injection, this will be for in about 2 weeks time.\par We are going to undergo a diagnostic and potentially therapeutic left hip injection under fluoroscopy. The patient understands that if the injection helps for the duration of the local anesthetic then it means that the hip joint is the cause of the pain. Whether or not there is longer pain relief from the corticosteroids will depend on the susceptibility of the underlying pathology to steroids.\par The risks and benefits were discussed and the patient would like to proceed.\par \par \par The risks and benefits were discussed which included bleeding, infection, nerve injury, no pain relief or worse, increased pain. All questions were answered and concerns addressed. \par  \par Medication based treatment options:\par pt does not want to take any more NSAIDs, I am ordering Voltaren gel. \par \par f/u s/p left hip injection \par \par \par

## 2023-02-17 ENCOUNTER — APPOINTMENT (OUTPATIENT)
Dept: PAIN MANAGEMENT | Facility: CLINIC | Age: 63
End: 2023-02-17
Payer: MEDICARE

## 2023-02-17 PROCEDURE — 20610 DRAIN/INJ JOINT/BURSA W/O US: CPT | Mod: LT

## 2023-02-17 PROCEDURE — 77002 NEEDLE LOCALIZATION BY XRAY: CPT

## 2023-02-19 NOTE — PROCEDURE
[FreeTextEntry3] : \par Date of Service: 02/19/2023 \par \par Account: [id number]\par \par Patient: ANGLE KING \par \par YOB: 1960\par \par Age: 62 year\par \par Surgeon:      Vincent Reis DO\par \par Pre-Operative Diagnosis:         Left Primary Osteoarthritis of Hip (M16.0)\par \par Post-Operative Diagnosis:       Same\par \par Procedure:             Left Hip arthrogram and steroid injection under fluoroscopic guidance. \par \par \par This procedure was carried out using fluoroscopic guidance.  The risks and benefits of the procedure were discussed extensively with the patient.  The consent of the patient was obtained and the following procedure was performed. The patient was placed in the supine position on the fluoroscopic table and the area was prepped and draped in a sterile fashion.  A timeout was performed with all essential staff present and the site and side were verified.\par \par The patient was placed in the supine position with left hip flexed and externally rotated 25 degrees.  The area of the left groin was prepped and draped in a sterile fashion.  The fluoroscopic image intensifier was then positioned so that the left hip appeared in view, and the midline intertrochanteric region was identified and marked.  A 25 gauge spinal needle was then inserted and directed into the left hip intra-capsular region.  After negative aspiration for heme and CSF, 3 cc of Omnipaque was injected and appeared to fill the joint margins.\par \par Left hip arthrogram showed no intravascular flow, and good spread around the femoral head and to the acetabulum. An injectate of 3 cc 0.25% marcaine plus 10 mg of Dexamethasone was then injected into the left hip space.\par \par The needle was subsequently removed.  Vital signs remained normal.  Pulse oximeter was used throughout the procedure and the patient's pulse and oxygen saturation remained within normal limits.  The patient tolerated the procedure well.  There were no complications.  The patient was instructed to apply ice over the injection sites for twenty minutes every two hours for the next 24 to 48 hours.\par \par Disposition:\par \par      1. The patient was advised to F/U in 1-2 weeks to assess the response to the injection.\par      2. The patient was also instructed to contact me immediately if there were any concerns related to the procedure performed.\par \par  \par Vincent Reis, \par

## 2023-03-02 ENCOUNTER — APPOINTMENT (OUTPATIENT)
Dept: PAIN MANAGEMENT | Facility: CLINIC | Age: 63
End: 2023-03-02
Payer: MEDICARE

## 2023-03-02 VITALS — WEIGHT: 195 LBS | HEIGHT: 70 IN | BODY MASS INDEX: 27.92 KG/M2

## 2023-03-02 PROCEDURE — 99214 OFFICE O/P EST MOD 30 MIN: CPT

## 2023-03-02 NOTE — DISCUSSION/SUMMARY
[de-identified] : A discussion regarding available pain management treatment options occurred with the patient. These included interventional, rehabilitative, pharmacological, and alternative modalities. We will proceed with the following: \par \par Interventional treatment options:\par Patient wants to undergo his second left genicular nerve block under fluoroscopy.  He had about 1 month of significant left knee pain relief after the first genicular block.  The patient had 90% pain relief after the left intra-articular hip injection under fluoroscopy and then the pain slowly came back to his baseline.  He has an appointment to see Dr. Hubert skaggs as in the next few months for a possible hip replacement.  He has had 2 positive hip injections with me but does not sustaining relief.\par \par \par The risks and benefits were discussed which included bleeding, infection, nerve injury, no pain relief or worse, increased pain. All questions were answered and concerns addressed. \par \par Follow-up for the left genicular block\par \par \par

## 2023-03-02 NOTE — PHYSICAL EXAM
[de-identified] : \par \par left knee\par TTP to medial and lateral joint\par mild effusion, no erythema, mildly antalgic gait\par \par pain with IR and ER in the left groin and lateral hip with restricted ROM\par

## 2023-03-02 NOTE — HISTORY OF PRESENT ILLNESS
[FreeTextEntry1] : 62 year old patient presents with dull, achy left knee pain worse with walking and better with sitting but worse with sitting for long periods of time. He has had this for more than 6 months and has done more than 10 weeks of PT which does help mildly. He occasionally tries NSAIDs and tylenol which does not help him. He is in severe pain in his left knee which impedes his ADLs and everyday activities. He also complains of burning, tingling sharp pain in b/l lower extremities when he lays down and is intermittent in nature. It improves when he is sitting and standing. He does have some left buttock tingling, pain and low back pain associated with it. \par \par interval hx 9/8/22:\par \par Patient states his left knee pain is 60% improve currently. He continues to have severe left low back pain tingling, burning, sharp that is radiates down the left posteriolateral leg more than the right leg. The pain is worse when laying down and intermittent in nature, improves with sitting. He denies bowel/bladder incontinence, falls. His ADLs and and quality of life is severely impaired with the back and radicular pain. NSAIDs and tylenol has not help.\par \par interval hx 10/20/22\par Pt states he had 60% pain relief after his previous knee steroid injection for 5 weeks. He continues to have left low back pain with radicular pain to the anterior hip and posterior thigh and calf that is burning, tingling, stabbing worse with sitting better with standing. He denies bowel/bladder incontinence, weakness, falls, knee buckling. MRI lumbar spine [place] has not been approved yet. The radicular pain is impeding his gait, ADLs, and QOL. He has been doing home exercises without relief. Mobic and gabapenitn has not helped his pain. \par \par interval hx 11/10/22\par Pt continues to have severe left groin pain with walking and standing, left low back pain that is sharp and burning that radiates down the lateral thigh and anterior leg with standing and sitting and severe left knee pain. He has 50% relief from his last steroid intraarticular knee injection roughly one month ago. The patient had severe left knee OA on imaging from this year and multilevel disc bulging worse at L4-5 on the left side on MRI lumbar spine from 2022. The patient denies bowel/bladder incontinence, weakness, falls. NSAIDs, gabapentin has not helped. His low back and radicular pain and left hip pain is impeding his gait, ADLs, QOL. \par \par interval hx 12/1/2022\par Pt presents with 40% pain relief to the left hip joint and is able to walk and stand more with less left groin pain. The patient continues to have left lateral hip pain, low back pain that radiates to the lateral thigh and anterior leg worse with sitting, laying flat. He denies bowel/bladder incontinence, weakness, falls. The pain is sharp and dull at times. the radicular pain is a 9/10 at its worst and sometimes impedes ADLs. \par \par interval hx 12/15/22\par Pt has 50% burning pain relief in his anterior legs worse when laying down at night after his LESI. He continues to have left knee pain from his OA and left groin pain from his hip OA. He denies bowel/bladder incontinence, weakness, falls, numbness. Left Groin and knee pain is worse with standing and walking and better with sitting. He takes gabapentin at bedtime for burning pain and mobic when overall pain is severe.\par \par interval hx 1/12/23\par Pt continues to have b/l low back pain with burning down his thighs into his anterior legs worse with sitting and laying down. He also has left knee pain that is worse with standing and walking and better with sitting which is dull, achy in nature, 10/10 in pain intensity which causes him to limp and also cause pain in his low back. He denies bowel, bladder incontinence, weakness, falls. The radicular pain occurs daily and is 7/10 in intensity. \par \par interval hx 2/2/23\par Patient presents today for follow-up. He notes he has had 60-70% relief s/p recent injection treatment. He is s/p left genicular knee injection, and an L4-5 LESI, 01/12/23 and 01/13/23. He notes good relief of about 60-70%, though sitting for long periods of time and walking up stairs increases the pain still. He does still have persistent hip pain. He is s/p left hip injection 11/16/22 with relief for 1.5 months. His hip pain has since returned. \par \par Interval history 3/2/2023\par Patient had 90% relief after the left hip injection for 1 week and the pain slowly returned to his baseline.  The pain is dull aching in nature worse with standing and walking better with sitting.  Patient had at least 1 month of significant left-sided knee pain relief after the left genicular nerve block which was roughly 80% pain relief.  The pain is not has not returned which is dull achy in nature worse with standing and walking better with sitting.  He denies bowel bladder incontinence weakness and falls.  Voltaren and NSAIDs does not help with the pain at all with the knee or the hip.\par \par

## 2023-03-10 ENCOUNTER — APPOINTMENT (OUTPATIENT)
Dept: PAIN MANAGEMENT | Facility: CLINIC | Age: 63
End: 2023-03-10
Payer: MEDICARE

## 2023-03-10 PROCEDURE — 94761 N-INVAS EAR/PLS OXIMETRY MLT: CPT

## 2023-03-10 PROCEDURE — 93770 DETERMINATION VENOUS PRESS: CPT

## 2023-03-10 PROCEDURE — 64454 NJX AA&/STRD GNCLR NRV BRNCH: CPT | Mod: LT

## 2023-03-10 NOTE — PROCEDURE
[FreeTextEntry3] : \par Surgeon:    Vincent Reis, DO\par \par Assistant:     None\par \par Pre-Operative Diagnosis: \par 1) Chronic left knee pain\par 2) left left osteoarthritis\par \par Post Operative Diagnosis:\par 1) Chronic left knee pain\par 2) left knee osteoarthritis\par \par Procedure:\par 1) left superior medial genicular nerve block with fluoroscopic guidance\par 2) left superior lateral genicular nerve block with fluoroscopic guidance\par 3) left inferomedial genicular nerve block with fluoroscopic guidance\par \par Anesthesia:  none\par \par This procedure was carried out using fluoroscopic guidance.  The risks and benefits of the procedure were discussed extensively with the patient.  The consent of the patient was obtained and the following procedure was performed.  The patient was placed in the supine position on the fluoroscopy table with the left knee flexed to approximately 60 degrees and the area was prepped and draped in a sterile fashion.  A timeout was performed with all essential staff present and the site and side were verified.\par \par Then a 3.5 inch 25 gauge spinal needle was advanced under fluoroscopic guidance to medial flare of the femur metaphysis/diaphysis junction on the left side.  The needle position was confirmed in A/P and lateral fluoroscopic views.  Then 1 ml of 0.5% Bupivacaine was injected after negative aspiration at the left superior medial genicular branch.   This was repeated in the exact same fashion at the left superior lateral genicular branch and inferior medial genicular branch.\par \par The needles were subsequently removed.  Vital signs remained normal.  Pulse oximeter was used throughout the procedure and the patient's pulse and oxygen saturation remained within normal limits.  The patient tolerated the procedure well.  There were no complications.  The patient was instructed to apply ice over the injection sites for twenty minutes every two hours for the next 24 to 48 hours.\par \par Disposition:\par \par 1. The patient was advised to F/U in 1-2 weeks to assess the response to the injection.\par 2. The patient was also instructed to contact me immediately if there were any concerns related to the procedure performed.\par \par  \par Vincent Reis, \par \par \par

## 2023-03-23 ENCOUNTER — APPOINTMENT (OUTPATIENT)
Dept: PAIN MANAGEMENT | Facility: CLINIC | Age: 63
End: 2023-03-23
Payer: MEDICARE

## 2023-03-23 VITALS — WEIGHT: 195 LBS | HEIGHT: 70 IN | BODY MASS INDEX: 27.92 KG/M2

## 2023-03-23 PROCEDURE — 99214 OFFICE O/P EST MOD 30 MIN: CPT

## 2023-03-23 NOTE — DATA REVIEWED
[FreeTextEntry1] : MRI of the lumbar spine taken on 11-4-2022 showed mild left L3-4 neural foraminal narrowing. Mild left L4-5 neural foraminal narrowing. Partially visualized severe degenerative changes of the left hip joint with probable large subchondral cysts.

## 2023-03-23 NOTE — HISTORY OF PRESENT ILLNESS
[FreeTextEntry1] : 62 year old patient presents with dull, achy left knee pain worse with walking and better with sitting but worse with sitting for long periods of time. He has had this for more than 6 months and has done more than 10 weeks of PT which does help mildly. He occasionally tries NSAIDs and tylenol which does not help him. He is in severe pain in his left knee which impedes his ADLs and everyday activities. He also complains of burning, tingling sharp pain in b/l lower extremities when he lays down and is intermittent in nature. It improves when he is sitting and standing. He does have some left buttock tingling, pain and low back pain associated with it. \par \par TODAY: Since last visit, he is status post left knee genicular nerve block on 3- with approximately 50% relief of her left knee pain. He states he still has some stiffness along with some pain with putting pressure over the left knee. Overall, he states the pain is currently tolerable. He rates the pain at a 5/10 on the pain scale. \par \par He is also presenting with a flare up of left lumbar radicular pain. He states the pain starts in the pain and radiates into the left lower extremity in a lateral aspect. He does note some numbness and tingling. He states the pain is worse with walking for extended periods of time in the leg. He states at times he has to walk with a limp secondary to the pain. He currently rates the pain at a 5/10 on the pain scale however when it is severe it can be a 7/10 on the pain scale. He states the pain is present throughout the day and constant in nature. \par \par To be reiterated, he did undergo 2 L4-5 interlaminar epidural injections in December and January which did provide him with 3 months of relief up until this point. \par

## 2023-03-23 NOTE — PHYSICAL EXAM
[de-identified] : \par \par left knee\par TTP to medial and lateral joint\par mild effusion, no erythema, mildly antalgic gait\par \par + SLR b/l\par

## 2023-03-23 NOTE — ASSESSMENT
[FreeTextEntry1] : A discussion regarding available pain management treatment options occurred with the patient.  These included interventional, rehabilitative, pharmacological, and alternative modalities. We will proceed with the following:\par \par Interventional treatment options:\par - Left L4-5, L5-S1 transforaminal epidural steroid injection with sedation.\par Treatment options were discussed with the patient. The patient has been having persistent lower back and lumbar radicular pain with minimal improvement with conservative therapies. Given that the patient has severe pain and failed conservative treatment, the patient was given the option to proceed with a lumbar epidural steroid injection to try to get some pain relief.  \par \par The risks and benefits were discussed which included bleeding, infection, nerve injury, no pain relief or worse, increased pain. All questions were answered and concerns addressed. \par \par Rehabilitative options:\par - continue with active HEP.\par \par Medication based treatment options:\par - deferred. \par \par \par - patient with follow up in 2 weeks after injection. \par \par \par I, Maricel Kwong, attest that this documentation has been prepared under the direction and in the presence of Provider Vincent Reis DO\par \par The documentation recorded by the scribe, in my presence, accurately reflects the service I personally performed, and the decisions made by me with my edits as appropriate.\par \par Best Regards, \par Vincent Reis D.O. \par

## 2023-04-14 ENCOUNTER — APPOINTMENT (OUTPATIENT)
Dept: PAIN MANAGEMENT | Facility: CLINIC | Age: 63
End: 2023-04-14

## 2023-04-14 ENCOUNTER — APPOINTMENT (OUTPATIENT)
Dept: PAIN MANAGEMENT | Facility: CLINIC | Age: 63
End: 2023-04-14
Payer: MEDICARE

## 2023-04-14 PROCEDURE — 64483 NJX AA&/STRD TFRM EPI L/S 1: CPT | Mod: LT

## 2023-04-14 PROCEDURE — 64484 NJX AA&/STRD TFRM EPI L/S EA: CPT | Mod: LT

## 2023-04-15 NOTE — PROCEDURE
[FreeTextEntry3] : Date of Service: 04/15/2023 \par \par Account: [id number]\par \par Patient: ANGLE KING \par \par YOB: 1960\par \par Age: 62 year\par \par \par Pre-Operative Diagnosis:   Lumbosacral Radiculopathy (M54.16)\par \par Post Operative Diagnosis: Lumbosacral Radiculupathy (M54.16)\par \par Procedure:             Left L4-L5, L5-S1 transforaminal epidural steroid injection under fluoroscopic guidance.\par \par Anesthesia:            none\par \par This procedure was carried out using fluoroscopic guidance.  The risks and benefits of the procedure were discussed extensively with the patient.  The consent of the patient was obtained and the following procedure was performed. The patient was placed in the prone position on the fluoroscopy table and the area was prepped and draped in a sterile fashion.  A timeout was performed with all essential staff present and the site and side were verified.\par \par The left L4-L5 neural foramen was then identified on left oblique "han dog" anatomical view at the 6 o' clock position using fluoroscopic guidance, and the area was marked.   A 25 gauge 3.5 inch spinal needle was directed toward the inferior (6 o'clock) position of the pedicle, which formed the roof of the identified foramen.  Once in the epidural space, after negative aspiration for heme and CSF, 1cc of Omnipaque contrast was injected to confirm epidural location and assess filling defects and rule out intravascular needle placement.\par \par Lumbar epidurogram showed no intravascular or intrathecal flow pattern.  No blood or CSF was aspirated.  Omnipaque spread medially in epidural space and outlined the exiting nerve root.\par \par After this, 3 cc of a mixture of 4cc of preservative free normal saline plus 20mg dexamethasone was injected in the epidural space\par \par The left L5-S1 neural foramen and was then identified on left oblique "han dog" anatomical view at the 6 o' clock position using fluoroscopic guidance, and the area was marked.  A 25 gauge 3.5 inch spinal needle was directed toward the inferior (6 o'clock) position of the pedicle, which formed the roof of the identified foramen.  Once in the epidural space, after negative aspiration for heme and CSF, 1cc of Omnipaque contrast was injected to confirm epidural location and assess filling defects and rule out intravascular needle placement. \par \par Lumbar epidurogram showed no intravascular or intrathecal flow pattern.  No blood or CSF was aspirated.  Omnipaque spread medially in epidural space and outlined the exiting nerve root. \par \par After this, the remainder of the injectate listed above was injected in the epidural space.\par \par The needle was subsequently removed.  Vital signs remained normal.  Pulse oximeter was used throughout the procedure and the patient's pulse and oxygen saturation remained within normal limits.  The patient tolerated the procedure well.  There were no complications.  The patient was instructed to apply ice over the injection sites for twenty minutes every two hours for the next 24 to 48 hours.\par \par Disposition:\par \par      1. The patient was advised to F/U in 1-2 weeks to assess the response to the injection.\par      2. The patient was also instructed to contact me immediately if there were any concerns related to the procedure performed.\par

## 2023-04-27 ENCOUNTER — NON-APPOINTMENT (OUTPATIENT)
Age: 63
End: 2023-04-27

## 2023-04-27 ENCOUNTER — APPOINTMENT (OUTPATIENT)
Dept: PAIN MANAGEMENT | Facility: CLINIC | Age: 63
End: 2023-04-27
Payer: MEDICARE

## 2023-04-27 VITALS — HEIGHT: 70 IN | BODY MASS INDEX: 27.92 KG/M2 | WEIGHT: 195 LBS

## 2023-04-27 DIAGNOSIS — M25.562 PAIN IN LEFT KNEE: ICD-10-CM

## 2023-04-27 LAB
PSA FREE FLD-MCNC: 8 %
PSA FREE SERPL-MCNC: 0.15 NG/ML
PSA SERPL-MCNC: 1.76 NG/ML

## 2023-04-27 PROCEDURE — 99214 OFFICE O/P EST MOD 30 MIN: CPT

## 2023-04-27 NOTE — DISCUSSION/SUMMARY
[de-identified] : A discussion regarding available pain management treatment options occurred with the patient.  These included interventional, rehabilitative, pharmacological, and alternative modalities. We will proceed with the following:\par \par Interventional treatment options:\par - Will proceed with a Left genicular ablation \par \par Rehabilitative options:\par - continue with active HEP.\par \par Medication based treatment options:\par - deferred. \par \par - patient with follow up in 2 weeks after injection. \par \par I, Landy Simons, attest that this documentation has been prepared under the direction and in the presence of Provider Vincent Reis DO\par \par The documentation recorded by the scribe, in my presence, accurately reflects the service I personally performed, and the decisions made by me with my edits as appropriate.\par \par Best Regards, \par Vincent Reis D.O.

## 2023-04-27 NOTE — HISTORY OF PRESENT ILLNESS
[FreeTextEntry1] : 62 year old patient presents with dull, achy left knee pain worse with walking and better with sitting but worse with sitting for long periods of time. He has had this for more than 6 months and has done more than 10 weeks of PT which does help mildly. He occasionally tries NSAIDs and tylenol which does not help him. He is in severe pain in his left knee which impedes his ADLs and everyday activities. He also complains of burning, tingling sharp pain in b/l lower extremities when he lays down and is intermittent in nature. It improves when he is sitting and standing. He does have some left buttock tingling, pain and low back pain associated with it. \par \par 4/27/23: Today this patient is status post Left L4-L5, L5-S1 transforaminal epidural steroid injection on 4/14/23 with approximately 85% relief. The radiating pain goes lateral thigh and leg. \par \par As for his constant left knee pain, he is still having some stiffness along with some pain with putting pressure over the left knee. The pain is dull, achy knee pain worse with standing and walking, better with sitting without associated numbness, tingling or radicular pain. He states at times he has to walk with a limp. Patient  Pain is 8/10 in intensity. He has tried NSAIDs, PT/HEP without significant relief. Patient denies locking, clicking, instability, falls. \par \par As for his severe hip pain, he has an appointment with Dr. Sin to discuss surgical options. He continues to have left groin pain that is worse with standing, walking that radiates down the anterior thigh that is dull in nature.

## 2023-05-05 ENCOUNTER — APPOINTMENT (OUTPATIENT)
Dept: PAIN MANAGEMENT | Facility: CLINIC | Age: 63
End: 2023-05-05
Payer: MEDICARE

## 2023-05-05 ENCOUNTER — APPOINTMENT (OUTPATIENT)
Dept: ORTHOPEDIC SURGERY | Facility: CLINIC | Age: 63
End: 2023-05-05
Payer: MEDICARE

## 2023-05-05 PROCEDURE — 01380 ANES ALL CLSD PX KNEE JOINT: CPT | Mod: QZ,P2

## 2023-05-05 PROCEDURE — 93770 DETERMINATION VENOUS PRESS: CPT | Mod: 59

## 2023-05-05 PROCEDURE — 99214 OFFICE O/P EST MOD 30 MIN: CPT

## 2023-05-05 PROCEDURE — 93770 DETERMINATION VENOUS PRESS: CPT

## 2023-05-05 PROCEDURE — 94761 N-INVAS EAR/PLS OXIMETRY MLT: CPT | Mod: 59

## 2023-05-05 PROCEDURE — 73502 X-RAY EXAM HIP UNI 2-3 VIEWS: CPT

## 2023-05-05 PROCEDURE — 94761 N-INVAS EAR/PLS OXIMETRY MLT: CPT

## 2023-05-05 PROCEDURE — 64624 DSTRJ NULYT AGT GNCLR NRV: CPT | Mod: LT

## 2023-05-05 NOTE — HISTORY OF PRESENT ILLNESS
[de-identified] : HISTORY\par Hip pain.\par Mechanical pain made worse with activity, not completely improved with rest, interfering with ADLs.\par At this point nonoperative management of the symptoms is ineffective and the patient has interest in moving forward with a joint replacement.\par \par Past medical history is on the chart for review and as mentioned above.\par \par ROS is negative for fever, chills, CP, SOB, unexplained weight loss or gain.\par \par \par \par EXAM\par Hip is painful with PROM and limited in flexion and internal rotation.\par There is significant guarding throughout the exam limiting ROM testing but no obvious contractures.\par NVID\par Decreased hip flexion strength compared to contralateral side.\par He actually is a little bit long on the left side because he has a prior right femur fracture.  He is long by about a centimeter on the left.\par \par \par Imaging:\par X-rays AP Pelvis and Frog Lateral of the hips shows advanced degenerative changes including loss of the joint space with bone to bone apposition, osteophytes, subchondral sclerosis and cystic changes.  Right intramedullary nail in place.\par \par \par Assessment: End stage arthritic hip.\par \par \par Plan is for a total hip replacement.\par The surgery, preoperative workup, surgical procedure, hospital course, post operative course, rehab, and expected outcomes were discussed.\par A description of the surgery in layman's terms, using models equivalent to the implants used during surgery, was a part of this conversation.\par The risks and benefits of the surgery were discussed.  Benefits  were described as improvement in pain and function.  Risks including bleeding, infection, blood clots, DVT, PE, stroke, heart attack, fracture, dislocation, leg length discrepancy, nerve palsy, neurovascular injury, persistent pain, RSD, and in rare cases death.\par Should the implant not function as expected or wear out then revision surgery may be required in the future. \par \par We will proceed with scheduling the procedure.\par \par

## 2023-05-06 NOTE — PROCEDURE
[FreeTextEntry3] : Date of Service: 05/06/2023 \par \par Account: [id number]\par \par Patient: ANGLE KING \par \par YOB: 1960\par \par Age: 62 year\par \par Surgeon:  Vincent Reis DO\par \par Assistant: None\par \par Pre-procedure Diagnosis:           \par 1) Left knee osteoarthritis\par 2) Chronic left knee pain                     \par \par Post-procedure Diagnosis:        \par 1) Left knee osteoarthritis\par 2) Chronic left knee pain\par \par Procedure Performed:\par 1) Left knee superior medial genicular radiofrequency neurotomy\par 2) Left knee superior lateral genicular radiofrequency neurotomy\par 3) Left knee inferior medial genicular radiofrequency neurotomy\par \par Anesthesia: MAC by Francois Remy\par \par Indications: The risks, benefits, and alternatives of the proposed procedure were discussed and accepted by the patient.  This procedure was deemed medically necessary after careful consideration by me and the patient.  The patient has undergone a diagnostic nerve block and has obtained greater than 80% relief of their chronic left knee pain.  They wish to undergo this procedure to obtain more long-lasting relief.\par \par Procedure:  This procedure was carried out using fluoroscopic guidance.  The risks and benefits of the procedure were discussed extensively with the patient.  The consent of the patient was obtained and the following procedure was performed.  The patient was placed in the supine position on the fluoroscopy table with the left knee flexed to approximately 60 degrees and the area was prepped and draped in a sterile fashion.  A timeout was performed with all essential staff present and the site and side were verified.\par \par Lidocaine 1% was injected subcutaneously for local anesthesia with a 25 gauge needle at the needle entry sites.  Then a 10 cm 20 gauge SMK needle with a 10 mm active tip was advanced under fluoroscopic guidance to medial flare of the femur metaphysis/diaphysis junction on the left side.  The needle position was confirmed in A/P and lateral fluoroscopic views.  Each needle was stimulated at 2Hz up to 3V without abnormal motor stimulation in the lower extremity.  Then 1 ml of 0.25% Bupivacaine was injected after negative aspiration at the targeted nerve branches.   Radiofrequency lesioning was performed at 80°C for 120 seconds. \par \par This was repeated in the exact same fashion at the left superior lateral genicular branch and left inferior medial genicular branch at the left lateral femoral metaphysis/diaphysis and left medial  tibial confluence.\par \par The needles were removed at the conclusion of the procedure and appropriate hemostasis was maintained.  The patient tolerated the procedure well. \par  \par Disposition:\par 1. The patient was advised to F/U in 1-2 weeks to assess the response to the injection.\par 2. The patient was also instructed to contact me immediately if there were any concerns related to the procedure performed.\par \par \par Vincent Reis, \par

## 2023-05-09 ENCOUNTER — APPOINTMENT (OUTPATIENT)
Dept: UROLOGY | Facility: CLINIC | Age: 63
End: 2023-05-09
Payer: MEDICARE

## 2023-05-09 ENCOUNTER — LABORATORY RESULT (OUTPATIENT)
Age: 63
End: 2023-05-09

## 2023-05-09 VITALS
DIASTOLIC BLOOD PRESSURE: 73 MMHG | BODY MASS INDEX: 27.92 KG/M2 | HEIGHT: 70 IN | TEMPERATURE: 97.4 F | OXYGEN SATURATION: 98 % | WEIGHT: 195 LBS | SYSTOLIC BLOOD PRESSURE: 118 MMHG | HEART RATE: 84 BPM | RESPIRATION RATE: 18 BRPM

## 2023-05-09 PROCEDURE — 99213 OFFICE O/P EST LOW 20 MIN: CPT

## 2023-05-09 NOTE — ASSESSMENT
[FreeTextEntry1] : ANGLE KING is a 62 year old male who presents for consultation for renal colic, CT found to have Lt ureteral stone, passed per pt, was asymptomatic with ultrasound confirming passage at his last visit.  \par \par Now with signs/symptoms of urinary tract infection and high PSA velocity.\par \par Plan:\par -UA C&S.\par -Renal/bladder ultrasound\par -Repeat PSA, depending on results\par \par

## 2023-05-09 NOTE — HISTORY OF PRESENT ILLNESS
[FreeTextEntry1] : ANGLE KING is a 62 year old male who presents for consultation for renal colic, CT found to have Lt ureteral stone, passed per pt, was asymptomatic with ultrasound confirming passage at his last visit. \par \par Presents today complaining of foul-smelling urine with mild intermittent dysuria and worsening urgency/frequency.\par \par Denies any abdominal/flank pain, suprapubic discomfort, renal colic, nausea/vomiting, chills/fever, or further constitutional symptoms.\par \par PSA, from 4/27/2023 is 1.76 with an 8% free fraction elevated from prior value of 0.87 in March 2022.\par \par Previously:\par CT abdomen pelvis images from May 2022\par  no hydroperinephrosis , bilaterally no kidney stones or urt \par left renal cyst 3 cm with peripheral calcification , seen previously unchanged \par Hounsfield units 19\par perivesical stranding \par \par UA \par UA- 5/22 \par wBS , no nitrate ,\par UC - no growth\par \par RBUS 3/2022 images agree with findings and the cyst has in fact been present since at least 2009\par 1.  No hydronephrosis. Interval resolution of mild left hydronephrosis seen on 12/30/2021 CT scan.\par 2.  Left renal cyst with peripheral calcification, stable compared to CT.\par 3.  Mildly enlarged prostate gland without evidence of urinary retention. Normal \par \par PSA 0.87 3/2022\par \par  PMH: Previously one episode of renal stone, prior pt of dr regalado no gu procedures\par Family History: No  malignancies\par Social History: on disability \par \par CT scan images from 12/2021: Mild Lt hydroureteronephrosis to the level of 3 mm proximal ureteral stone, additional 1 mm stone proximal to the initial stone. [id number]. No stones in the kidneys BL. \par 8 mm pulmonary nodule. WHich he has been following for 20 years (no change). \par \par Cr=1.2 \par UA: negative \par Ca= 9.5 // \par \par \par

## 2023-05-15 ENCOUNTER — NON-APPOINTMENT (OUTPATIENT)
Age: 63
End: 2023-05-15

## 2023-05-15 RX ORDER — AMOXICILLIN AND CLAVULANATE POTASSIUM 500; 125 MG/1; MG/1
500-125 TABLET, FILM COATED ORAL
Qty: 14 | Refills: 0 | Status: ACTIVE | COMMUNITY
Start: 2023-05-15 | End: 1900-01-01

## 2023-05-20 ENCOUNTER — RESULT REVIEW (OUTPATIENT)
Age: 63
End: 2023-05-20

## 2023-05-20 ENCOUNTER — OUTPATIENT (OUTPATIENT)
Dept: OUTPATIENT SERVICES | Facility: HOSPITAL | Age: 63
LOS: 1 days | End: 2023-05-20
Payer: MEDICARE

## 2023-05-20 DIAGNOSIS — Z87.19 PERSONAL HISTORY OF OTHER DISEASES OF THE DIGESTIVE SYSTEM: Chronic | ICD-10-CM

## 2023-05-20 DIAGNOSIS — Z98.890 OTHER SPECIFIED POSTPROCEDURAL STATES: Chronic | ICD-10-CM

## 2023-05-20 DIAGNOSIS — R30.0 DYSURIA: ICD-10-CM

## 2023-05-20 DIAGNOSIS — N20.0 CALCULUS OF KIDNEY: ICD-10-CM

## 2023-05-20 DIAGNOSIS — Z00.8 ENCOUNTER FOR OTHER GENERAL EXAMINATION: ICD-10-CM

## 2023-05-20 PROCEDURE — 76770 US EXAM ABDO BACK WALL COMP: CPT

## 2023-05-20 PROCEDURE — 76770 US EXAM ABDO BACK WALL COMP: CPT | Mod: 26

## 2023-05-21 DIAGNOSIS — R30.0 DYSURIA: ICD-10-CM

## 2023-05-21 DIAGNOSIS — N20.0 CALCULUS OF KIDNEY: ICD-10-CM

## 2023-05-22 ENCOUNTER — NON-APPOINTMENT (OUTPATIENT)
Age: 63
End: 2023-05-22

## 2023-06-01 ENCOUNTER — APPOINTMENT (OUTPATIENT)
Dept: PAIN MANAGEMENT | Facility: CLINIC | Age: 63
End: 2023-06-01
Payer: MEDICARE

## 2023-06-01 VITALS — WEIGHT: 195 LBS | HEIGHT: 70 IN | BODY MASS INDEX: 27.92 KG/M2

## 2023-06-01 DIAGNOSIS — M25.562 PAIN IN LEFT KNEE: ICD-10-CM

## 2023-06-01 DIAGNOSIS — G89.29 PAIN IN LEFT KNEE: ICD-10-CM

## 2023-06-01 PROCEDURE — 99214 OFFICE O/P EST MOD 30 MIN: CPT

## 2023-06-02 PROBLEM — M25.562 CHRONIC PAIN OF LEFT KNEE: Status: ACTIVE | Noted: 2023-04-27

## 2023-06-02 NOTE — HISTORY OF PRESENT ILLNESS
[FreeTextEntry1] : 62 year old patient presents with dull, achy left knee pain worse with walking and better with sitting but worse with sitting for long periods of time. He has had this for more than 6 months and has done more than 10 weeks of PT which does help mildly. He occasionally tries NSAIDs and tylenol which does not help him. He is in severe pain in his left knee which impedes his ADLs and everyday activities. He also complains of burning, tingling sharp pain in b/l lower extremities when he lays down and is intermittent in nature. It improves when he is sitting and standing. He does have some left buttock tingling, pain and low back pain associated with it. \par \par 4/27/23: Today this patient is status post Left L4-L5, L5-S1 transforaminal epidural steroid injection on 4/14/23 with approximately 85% relief. The radiating pain goes lateral thigh and leg. \par \par As for his constant left knee pain, he is still having some stiffness along with some pain with putting pressure over the left knee. The pain is dull, achy knee pain worse with standing and walking, better with sitting without associated numbness, tingling or radicular pain. He states at times he has to walk with a limp. Patient  Pain is 8/10 in intensity. He has tried NSAIDs, PT/HEP without significant relief. Patient denies locking, clicking, instability, falls. \par \par As for his severe hip pain, he has an appointment with Dr. Sin to discuss surgical options. He continues to have left groin pain that is worse with standing, walking that radiates down the anterior thigh that is dull in nature.\par \par 6/1/2023\par  Patient presents with 70% improvement of the left knee pain which is dull, achy worse with standing and walking and improved with sitting after the left genicular ablation. The patient also presents with b/l sharp, tingling low back pain that radiates down to his posterior lateral legs that is intermittent in nature and worse with sitting. Pt denies bowel/bladder incontinence, weakness, falls, unsteadiness. The pain can reach an 8/10 at its worst. He had 3 epidurals prior with relief. Mobic does not seem to help the pain and the patient does not want to take it anymore. \par

## 2023-06-02 NOTE — DISCUSSION/SUMMARY
[de-identified] : A discussion regarding available pain management treatment options occurred with the patient.  These included interventional, rehabilitative, pharmacological, and alternative modalities. We will proceed with the following:\par \par Interventional treatment options:\par Treatment options were discussed with the patient. The patient has been having persistent lower back and lumbar radicular pain with minimal improvement with conservative therapies. Given that the patient has severe pain and failed conservative treatment, the patient was given the option to proceed with a lumbar epidural steroid injection to try to get some pain relief.  \par \par The risks and benefits were discussed which included bleeding, infection, nerve injury, no pain relief or worse, increased pain. All questions were answered and concerns addressed. 4th epidural --LESI L5-s1\par \par Rehabilitative options:\par - continue with active HEP.\par \par Medication based treatment options:\par - deferred\par \par - patient with follow up in 2 weeks after injection. \par \par I, Landy Simons, attest that this documentation has been prepared under the direction and in the presence of Provider Vincent Reis DO\par \par The documentation recorded by the scribe, in my presence, accurately reflects the service I personally performed, and the decisions made by me with my edits as appropriate.\par \par Best Regards, \par Vincent Reis D.O.

## 2023-06-02 NOTE — PHYSICAL EXAM
[de-identified] : lumbar\par + seated slump on the left\par -fabers, compression test on left side\par \par left knee\par TTP to medial and lateral joint\par mildly antalgic gait\par \par left hip\par Pain with IR of left hip into the groin and restricted AROM\par

## 2023-06-16 ENCOUNTER — APPOINTMENT (OUTPATIENT)
Dept: PAIN MANAGEMENT | Facility: CLINIC | Age: 63
End: 2023-06-16
Payer: MEDICARE

## 2023-06-16 PROCEDURE — 94761 N-INVAS EAR/PLS OXIMETRY MLT: CPT

## 2023-06-16 PROCEDURE — 62323 NJX INTERLAMINAR LMBR/SAC: CPT

## 2023-06-16 PROCEDURE — 93770 DETERMINATION VENOUS PRESS: CPT | Mod: 59

## 2023-06-16 NOTE — PROCEDURE
[FreeTextEntry3] : Date of Service: 06/16/2023 \par \par Account: [id number]\par \par Patient: ANGLE KING \par \par YOB: 1960\par \par Age: 63 year\par \par Surgeon:      Vincent Reis DO\par \par Assistant:    None\par \par Pre-Operative Diagnosis:         Lumbosacral Radiculopathy (M54.16)\par \par Post Operative Diagnosis:       Lumbosacral Radiculopathy (M54.16)   \par \par Procedure:             Lumbar interlaminar (L5-S1) epidural steroid injection under fluoroscopic guidance\par \par Anesthesia:            none\par \par This procedure was carried out using fluoroscopic guidance.  The risks and benefits of the procedure were discussed extensively with the patient.  The consent of the patient was obtained and the following procedure was performed. The patient was placed in the prone position on the fluoroscopy table and the area was prepped and draped in a sterile fashion.  A timeout was performed with all essential staff present and the site and side were verified.\par \par The patient was placed in the prone position with a pillow under the abdomen to minimize the lumbar lordosis.  The lumbar area was prepped and draped in a sterile fashion.  Under A/P view with slight cephalad-caudad angulation, the L5-S1 interspace was identified and marked.  Using sterile technique the superficial skin was anesthetized with 1% Lidocaine.  A 20 gauge Tuohy needle was advanced into the epidural space under fluoroscopy using loss of resistance at the L5-S1 level.  After negative aspiration for heme or CSF, an epidurogram was obtained in the A/P fluoroscopic views using 2-3 cc of Omnipaque contrast confirming epidural placement of the needle.  After this, 5 cc of of a mixture of preservative free normal saline and 10mg dexamethasone were injected into the epidural space. \par \par The needle was subsequently removed.  Vital signs remained normal.  Pulse oximeter was used throughout the procedure and the patient's pulse and oxygen saturation remained within normal limits.  The patient tolerated the procedure well.  There were no complications.  The patient was instructed to apply ice over the injection sites for twenty minutes every two hours for the next 24 to 48 hours.\par \par Disposition:\par \par      1. The patient was advised to F/U in 1-2 weeks to assess the response to the injection.\par      2. The patient was also instructed to contact me immediately if there were any concerns related to the procedure performed.\par \par  \par Vincent Reis DO\par

## 2023-06-27 ENCOUNTER — APPOINTMENT (OUTPATIENT)
Dept: UROLOGY | Facility: CLINIC | Age: 63
End: 2023-06-27
Payer: MEDICARE

## 2023-06-27 ENCOUNTER — LABORATORY RESULT (OUTPATIENT)
Age: 63
End: 2023-06-27

## 2023-06-27 VITALS
BODY MASS INDEX: 27.92 KG/M2 | SYSTOLIC BLOOD PRESSURE: 112 MMHG | TEMPERATURE: 97.6 F | DIASTOLIC BLOOD PRESSURE: 68 MMHG | HEART RATE: 94 BPM | HEIGHT: 70 IN | WEIGHT: 195 LBS

## 2023-06-27 DIAGNOSIS — R30.0 DYSURIA: ICD-10-CM

## 2023-06-27 PROCEDURE — 99214 OFFICE O/P EST MOD 30 MIN: CPT

## 2023-06-28 NOTE — ADDENDUM
[FreeTextEntry1] : Agree with the above documentation as outlined by the PA which I have addended as necessary.\par \par The submitted E/M billing level for this visit reflects the total time spent on the day of the visit including face-to-face time spent with the patient, non-face-to-face review of medical records and relevant information, documentation, and asynchronous communication with the patient after a visit via phone, email, or patient’s EHR portal after the visit. \par The medical records reviewed are either scanned into the chart or reviewed with the patient using a patient’s electronic medical records portal for patients with records not available to [facility] via electronic transmission platforms from other institutions and labs. \par Time spend counseling and performing coordination of care was also included in determining the appropriate EM billing level.\par

## 2023-06-28 NOTE — HISTORY OF PRESENT ILLNESS
[FreeTextEntry1] : ANGLE KING is a 62 year old male who presents for consultation for renal colic, CT found to have Lt ureteral stone, passed per pt, was asymptomatic with ultrasound confirming passage at his last visit. \par \par At his last visit he was found to have significant PSA velocity, his PSA was 1.76, from 4/27/2023, and increased from 0.87 in March 2022\par \par He had dysuria and urine testing demonstrated E. coli.  He was treated with Augmentin with resolution of his symptoms.\par \par Renal/bladder ultrasound, from 5/22/2023:  Demonstrates unremarkable examination of right kidney.  There is a peripherally calcified 3 mm midpole left renal cyst, stable in size.  Diffuse mildly thickened bladder wall a 7 mm, previously 5 mm, which is a new finding. symmetric appearing Postvoid 97 cc with a prostate volume of 29 cc.\par Imaging reviewed with patient which demonstrates perhaps a thick septation, perhaps solid component of this left cyst.  Prior CT imaging was reviewed which demonstrate area of calcifications\par \par Previously:\par PSA, from 4/27/2023 is 1.76 with an 8% free fraction elevated from prior value of 0.87 in March 2022.\par \par CT abdomen pelvis images from May 2022\par  no hydroperinephrosis , bilaterally no kidney stones or urt \par left renal cyst 3 cm with peripheral calcification , seen previously unchanged \par Hounsfield units 19\par perivesical stranding \par \par UA \par UA- 5/22 \par wBS , no nitrate ,\par UC - no growth\par \par RBUS 3/2022 images agree with findings and the cyst has in fact been present since at least 2009\par 1.  No hydronephrosis. Interval resolution of mild left hydronephrosis seen on 12/30/2021 CT scan.\par 2.  Left renal cyst with peripheral calcification, stable compared to CT.\par 3.  Mildly enlarged prostate gland without evidence of urinary retention. Normal \par \par PSA 0.87 3/2022\par \par  PMH: Previously one episode of renal stone, prior pt of dr regalado no gu procedures\par Family History: No  malignancies\par Social History: on disability \par \par CT scan images from 12/2021: Mild Lt hydroureteronephrosis to the level of 3 mm proximal ureteral stone, additional 1 mm stone proximal to the initial stone. YI=[id number]. No stones in the kidneys BL. \par 8 mm pulmonary nodule. WHich he has been following for 20 years (no change). \par \par Cr=1.2 \par UA: negative \par Ca= 9.5 // \par \par \par

## 2023-06-28 NOTE — ASSESSMENT
[FreeTextEntry1] : ANGLE KING is a 62 year old male who presents for consultation for renal colic, CT found to have Lt ureteral stone, passed per pt, was asymptomatic with ultrasound confirming passage at his last visit.  Elevated PSA velocity, urine positive for E. coli.  Treated now without symptoms.  Imaging demonstrates left cyst, with possible solid component vs thick septation.\par \par Plan:\par -UA C&S.\par -Repeat PSA\par -MRI of abdomen for evaluation of this complex cyst.  Importance of follow-up reviewed with patient\par

## 2023-06-29 ENCOUNTER — NON-APPOINTMENT (OUTPATIENT)
Age: 63
End: 2023-06-29

## 2023-06-29 ENCOUNTER — APPOINTMENT (OUTPATIENT)
Dept: PAIN MANAGEMENT | Facility: CLINIC | Age: 63
End: 2023-06-29
Payer: MEDICARE

## 2023-06-29 VITALS
HEIGHT: 70 IN | WEIGHT: 195 LBS | BODY MASS INDEX: 27.92 KG/M2 | SYSTOLIC BLOOD PRESSURE: 118 MMHG | HEART RATE: 86 BPM | DIASTOLIC BLOOD PRESSURE: 79 MMHG

## 2023-06-29 DIAGNOSIS — M25.569 PAIN IN UNSPECIFIED KNEE: ICD-10-CM

## 2023-06-29 DIAGNOSIS — G89.29 PAIN IN UNSPECIFIED KNEE: ICD-10-CM

## 2023-06-29 DIAGNOSIS — M54.16 RADICULOPATHY, LUMBAR REGION: ICD-10-CM

## 2023-06-29 LAB
PSA FREE FLD-MCNC: 10 %
PSA FREE SERPL-MCNC: 0.17 NG/ML
PSA SERPL-MCNC: 1.73 NG/ML

## 2023-06-29 PROCEDURE — 99214 OFFICE O/P EST MOD 30 MIN: CPT

## 2023-06-29 NOTE — DISCUSSION/SUMMARY
[de-identified] : A discussion regarding available pain management treatment options occurred with the patient.  These included interventional, rehabilitative, pharmacological, and alternative modalities. We will proceed with the following:\par \par Interventional treatment options:\par sent auth for a left GTB injection with fluoro\par \par Rehabilitative options:\par - continue with active HEP.\par \par Medication based treatment options:\par - deferred\par \par - patient with follow up in 2 weeks after injection. \par

## 2023-06-29 NOTE — HISTORY OF PRESENT ILLNESS
[FreeTextEntry1] : 62 year old patient presents with dull, achy left knee pain worse with walking and better with sitting but worse with sitting for long periods of time. He has had this for more than 6 months and has done more than 10 weeks of PT which does help mildly. He occasionally tries NSAIDs and tylenol which does not help him. He is in severe pain in his left knee which impedes his ADLs and everyday activities. He also complains of burning, tingling sharp pain in b/l lower extremities when he lays down and is intermittent in nature. It improves when he is sitting and standing. He does have some left buttock tingling, pain and low back pain associated with it. \par \par 4/27/23: Today this patient is status post Left L4-L5, L5-S1 transforaminal epidural steroid injection on 4/14/23 with approximately 85% relief. The radiating pain goes lateral thigh and leg. \par \par As for his constant left knee pain, he is still having some stiffness along with some pain with putting pressure over the left knee. The pain is dull, achy knee pain worse with standing and walking, better with sitting without associated numbness, tingling or radicular pain. He states at times he has to walk with a limp. Patient  Pain is 8/10 in intensity. He has tried NSAIDs, PT/HEP without significant relief. Patient denies locking, clicking, instability, falls. \par \par As for his severe hip pain, he has an appointment with Dr. Sin to discuss surgical options. He continues to have left groin pain that is worse with standing, walking that radiates down the anterior thigh that is dull in nature.\par \par 6/1/2023\par  Patient presents with 70% improvement of the left knee pain which is dull, achy worse with standing and walking and improved with sitting after the left genicular ablation. The patient also presents with b/l sharp, tingling low back pain that radiates down to his posterior lateral legs that is intermittent in nature and worse with sitting. Pt denies bowel/bladder incontinence, weakness, falls, unsteadiness. The pain can reach an 8/10 at its worst. He had 3 epidurals prior with relief. Mobic does not seem to help the pain and the patient does not want to take it anymore. \par \par 6/29/2023\par The patient has 65% pain relief after the lumbar epidural steroid injection in his low back pain and radicular features.  The patient is having left greater trochanteric bursa pain to palpation with pressing on it as well as lying on the left side that sometimes radiates down the lateral thigh.  The pain is about a 7-8 out of 10 at its worst which is occurring daily.  The patient is having a left hip replacement in 4 weeks.  The patient cannot tolerate NSAIDs.  The patient is in too much pain from his hip to do physical therapy.  The pain is dull achy in nature.\par

## 2023-06-29 NOTE — PHYSICAL EXAM
[de-identified] : lumbar\par + seated slump on the left\par \par \par left knee\par TTP to medial and lateral joint\par mildly antalgic gait\par \par left hip\par Pain with IR of left hip into the groin and restricted AROM\par Tenderness to palpation of the left greater trochanteric bursa\par

## 2023-06-30 ENCOUNTER — APPOINTMENT (OUTPATIENT)
Dept: PAIN MANAGEMENT | Facility: CLINIC | Age: 63
End: 2023-06-30
Payer: MEDICARE

## 2023-06-30 PROCEDURE — 77002 NEEDLE LOCALIZATION BY XRAY: CPT

## 2023-06-30 PROCEDURE — 20610 DRAIN/INJ JOINT/BURSA W/O US: CPT | Mod: LT

## 2023-07-01 NOTE — PROCEDURE
[FreeTextEntry3] : PROCEDURE: a left-sided  trochanteric bursa injection under fluoroscopic\par guidance\par REASON FOR PROCEDURE: left-sided trochanteric bursitis\par PHYSICIAN: Vincent Reis DO\par MEDICATIONS INJECTED: 10mg of dexamethasone mixed with 4ccs of 0.25% bupivicaine\par LOCAL ANESTHETIC INJECTED: None\par SEDATION MEDICATIONS: None\par ESTIMATED BLOOD LOSS: None\par COMPLICATIONS: None\par TECHNIQUE: Time-out was taken to identify the correct patient, procedure and side prior to starting the procedure. With the patient in the side-lying position, with the right side up, the patient was prepped and draped in the usual sterile fashion using ChloraPrep and a fenestrated drape. A 25-gauge, 3.5-inch Quincke needle was introduced under intermittent fluoroscopy until the tip reached the lateral greater trochanter. The needle was then withdrawn 2 to 5 mm and contrast was injected to confirm intrabursal\par placement. The above injectate was then given. The needle was then removed intact. The procedure was completed without complications and was tolerated well. The patient was monitored after the procedure. The patient was given post-procedure and discharge instructions to follow at home. The patient was discharged in stable condition. A follow-up plan was made.\par Pre-procedure pain score: 9/10\par Post-procedure pain score: 0/10\par

## 2023-07-02 ENCOUNTER — INPATIENT (INPATIENT)
Facility: HOSPITAL | Age: 63
LOS: 3 days | Discharge: HOME | DRG: 872 | End: 2023-07-06
Attending: INTERNAL MEDICINE | Admitting: STUDENT IN AN ORGANIZED HEALTH CARE EDUCATION/TRAINING PROGRAM
Payer: MEDICARE

## 2023-07-02 VITALS
DIASTOLIC BLOOD PRESSURE: 74 MMHG | WEIGHT: 184.97 LBS | HEART RATE: 128 BPM | SYSTOLIC BLOOD PRESSURE: 156 MMHG | TEMPERATURE: 99 F | RESPIRATION RATE: 18 BRPM | OXYGEN SATURATION: 96 % | HEIGHT: 69 IN

## 2023-07-02 DIAGNOSIS — Z98.890 OTHER SPECIFIED POSTPROCEDURAL STATES: Chronic | ICD-10-CM

## 2023-07-02 DIAGNOSIS — N12 TUBULO-INTERSTITIAL NEPHRITIS, NOT SPECIFIED AS ACUTE OR CHRONIC: ICD-10-CM

## 2023-07-02 DIAGNOSIS — Z87.19 PERSONAL HISTORY OF OTHER DISEASES OF THE DIGESTIVE SYSTEM: Chronic | ICD-10-CM

## 2023-07-02 LAB
ALBUMIN SERPL ELPH-MCNC: 4.3 G/DL — SIGNIFICANT CHANGE UP (ref 3.5–5.2)
ALP SERPL-CCNC: 59 U/L — SIGNIFICANT CHANGE UP (ref 30–115)
ALT FLD-CCNC: 60 U/L — HIGH (ref 0–41)
ANION GAP SERPL CALC-SCNC: 12 MMOL/L — SIGNIFICANT CHANGE UP (ref 7–14)
APPEARANCE UR: CLEAR — SIGNIFICANT CHANGE UP
AST SERPL-CCNC: 57 U/L — HIGH (ref 0–41)
BACTERIA # UR AUTO: ABNORMAL
BASOPHILS # BLD AUTO: 0.03 K/UL — SIGNIFICANT CHANGE UP (ref 0–0.2)
BASOPHILS NFR BLD AUTO: 0.2 % — SIGNIFICANT CHANGE UP (ref 0–1)
BILIRUB SERPL-MCNC: 0.6 MG/DL — SIGNIFICANT CHANGE UP (ref 0.2–1.2)
BILIRUB UR-MCNC: NEGATIVE — SIGNIFICANT CHANGE UP
BUN SERPL-MCNC: 19 MG/DL — SIGNIFICANT CHANGE UP (ref 10–20)
CALCIUM SERPL-MCNC: 9.3 MG/DL — SIGNIFICANT CHANGE UP (ref 8.4–10.5)
CHLORIDE SERPL-SCNC: 102 MMOL/L — SIGNIFICANT CHANGE UP (ref 98–110)
CO2 SERPL-SCNC: 23 MMOL/L — SIGNIFICANT CHANGE UP (ref 17–32)
COLOR SPEC: SIGNIFICANT CHANGE UP
CREAT SERPL-MCNC: 1.3 MG/DL — SIGNIFICANT CHANGE UP (ref 0.7–1.5)
DIFF PNL FLD: ABNORMAL
EGFR: 61 ML/MIN/1.73M2 — SIGNIFICANT CHANGE UP
EOSINOPHIL # BLD AUTO: 0 K/UL — SIGNIFICANT CHANGE UP (ref 0–0.7)
EOSINOPHIL NFR BLD AUTO: 0 % — SIGNIFICANT CHANGE UP (ref 0–8)
EPI CELLS # UR: 0 /HPF — SIGNIFICANT CHANGE UP (ref 0–5)
GLUCOSE SERPL-MCNC: 119 MG/DL — HIGH (ref 70–99)
GLUCOSE UR QL: NEGATIVE — SIGNIFICANT CHANGE UP
HCT VFR BLD CALC: 38.4 % — LOW (ref 42–52)
HGB BLD-MCNC: 13 G/DL — LOW (ref 14–18)
HYALINE CASTS # UR AUTO: 1 /LPF — SIGNIFICANT CHANGE UP (ref 0–7)
IMM GRANULOCYTES NFR BLD AUTO: 0.8 % — HIGH (ref 0.1–0.3)
KETONES UR-MCNC: NEGATIVE — SIGNIFICANT CHANGE UP
LACTATE SERPL-SCNC: 1.5 MMOL/L — SIGNIFICANT CHANGE UP (ref 0.7–2)
LEUKOCYTE ESTERASE UR-ACNC: ABNORMAL
LIDOCAIN IGE QN: 23 U/L — SIGNIFICANT CHANGE UP (ref 7–60)
LYMPHOCYTES # BLD AUTO: 1.09 K/UL — LOW (ref 1.2–3.4)
LYMPHOCYTES # BLD AUTO: 6.6 % — LOW (ref 20.5–51.1)
MCHC RBC-ENTMCNC: 31.9 PG — HIGH (ref 27–31)
MCHC RBC-ENTMCNC: 33.9 G/DL — SIGNIFICANT CHANGE UP (ref 32–37)
MCV RBC AUTO: 94.1 FL — HIGH (ref 80–94)
MONOCYTES # BLD AUTO: 1.29 K/UL — HIGH (ref 0.1–0.6)
MONOCYTES NFR BLD AUTO: 7.8 % — SIGNIFICANT CHANGE UP (ref 1.7–9.3)
NEUTROPHILS # BLD AUTO: 14.03 K/UL — HIGH (ref 1.4–6.5)
NEUTROPHILS NFR BLD AUTO: 84.6 % — HIGH (ref 42.2–75.2)
NITRITE UR-MCNC: POSITIVE
NRBC # BLD: 0 /100 WBCS — SIGNIFICANT CHANGE UP (ref 0–0)
PH UR: 7 — SIGNIFICANT CHANGE UP (ref 5–8)
PLATELET # BLD AUTO: 241 K/UL — SIGNIFICANT CHANGE UP (ref 130–400)
PMV BLD: 10 FL — SIGNIFICANT CHANGE UP (ref 7.4–10.4)
POTASSIUM SERPL-MCNC: 4.3 MMOL/L — SIGNIFICANT CHANGE UP (ref 3.5–5)
POTASSIUM SERPL-SCNC: 4.3 MMOL/L — SIGNIFICANT CHANGE UP (ref 3.5–5)
PROT SERPL-MCNC: 6.5 G/DL — SIGNIFICANT CHANGE UP (ref 6–8)
PROT UR-MCNC: ABNORMAL
RBC # BLD: 4.08 M/UL — LOW (ref 4.7–6.1)
RBC # FLD: 13.2 % — SIGNIFICANT CHANGE UP (ref 11.5–14.5)
RBC CASTS # UR COMP ASSIST: 5 /HPF — HIGH (ref 0–4)
SODIUM SERPL-SCNC: 137 MMOL/L — SIGNIFICANT CHANGE UP (ref 135–146)
SP GR SPEC: >1.05 (ref 1.01–1.03)
TROPONIN T SERPL-MCNC: <0.01 NG/ML — SIGNIFICANT CHANGE UP
UROBILINOGEN FLD QL: SIGNIFICANT CHANGE UP
WBC # BLD: 16.58 K/UL — HIGH (ref 4.8–10.8)
WBC # FLD AUTO: 16.58 K/UL — HIGH (ref 4.8–10.8)
WBC UR QL: 28 /HPF — HIGH (ref 0–5)

## 2023-07-02 PROCEDURE — 83735 ASSAY OF MAGNESIUM: CPT

## 2023-07-02 PROCEDURE — 80053 COMPREHEN METABOLIC PANEL: CPT

## 2023-07-02 PROCEDURE — 80061 LIPID PANEL: CPT

## 2023-07-02 PROCEDURE — 87186 SC STD MICRODIL/AGAR DIL: CPT

## 2023-07-02 PROCEDURE — 85027 COMPLETE CBC AUTOMATED: CPT

## 2023-07-02 PROCEDURE — 87086 URINE CULTURE/COLONY COUNT: CPT

## 2023-07-02 PROCEDURE — 87040 BLOOD CULTURE FOR BACTERIA: CPT

## 2023-07-02 PROCEDURE — 86803 HEPATITIS C AB TEST: CPT

## 2023-07-02 PROCEDURE — 74177 CT ABD & PELVIS W/CONTRAST: CPT | Mod: 26,MA

## 2023-07-02 PROCEDURE — 83036 HEMOGLOBIN GLYCOSYLATED A1C: CPT

## 2023-07-02 PROCEDURE — 85610 PROTHROMBIN TIME: CPT

## 2023-07-02 PROCEDURE — 85730 THROMBOPLASTIN TIME PARTIAL: CPT

## 2023-07-02 PROCEDURE — 36415 COLL VENOUS BLD VENIPUNCTURE: CPT

## 2023-07-02 PROCEDURE — 93010 ELECTROCARDIOGRAM REPORT: CPT

## 2023-07-02 PROCEDURE — 87150 DNA/RNA AMPLIFIED PROBE: CPT

## 2023-07-02 PROCEDURE — 85025 COMPLETE CBC W/AUTO DIFF WBC: CPT

## 2023-07-02 PROCEDURE — 80048 BASIC METABOLIC PNL TOTAL CA: CPT

## 2023-07-02 PROCEDURE — 99285 EMERGENCY DEPT VISIT HI MDM: CPT

## 2023-07-02 PROCEDURE — 99222 1ST HOSP IP/OBS MODERATE 55: CPT | Mod: 1L

## 2023-07-02 PROCEDURE — 93005 ELECTROCARDIOGRAM TRACING: CPT

## 2023-07-02 RX ORDER — ACETAMINOPHEN 500 MG
650 TABLET ORAL ONCE
Refills: 0 | Status: DISCONTINUED | OUTPATIENT
Start: 2023-07-02 | End: 2023-07-02

## 2023-07-02 RX ORDER — CEFTRIAXONE 500 MG/1
1000 INJECTION, POWDER, FOR SOLUTION INTRAMUSCULAR; INTRAVENOUS ONCE
Refills: 0 | Status: COMPLETED | OUTPATIENT
Start: 2023-07-02 | End: 2023-07-02

## 2023-07-02 RX ORDER — LANOLIN ALCOHOL/MO/W.PET/CERES
3 CREAM (GRAM) TOPICAL AT BEDTIME
Refills: 0 | Status: DISCONTINUED | OUTPATIENT
Start: 2023-07-02 | End: 2023-07-06

## 2023-07-02 RX ORDER — GABAPENTIN 400 MG/1
300 CAPSULE ORAL
Refills: 0 | Status: DISCONTINUED | OUTPATIENT
Start: 2023-07-02 | End: 2023-07-06

## 2023-07-02 RX ORDER — PANTOPRAZOLE SODIUM 20 MG/1
40 TABLET, DELAYED RELEASE ORAL
Refills: 0 | Status: DISCONTINUED | OUTPATIENT
Start: 2023-07-02 | End: 2023-07-06

## 2023-07-02 RX ORDER — ACETAMINOPHEN 500 MG
650 TABLET ORAL ONCE
Refills: 0 | Status: COMPLETED | OUTPATIENT
Start: 2023-07-02 | End: 2023-07-02

## 2023-07-02 RX ORDER — ONDANSETRON 8 MG/1
4 TABLET, FILM COATED ORAL EVERY 8 HOURS
Refills: 0 | Status: DISCONTINUED | OUTPATIENT
Start: 2023-07-02 | End: 2023-07-06

## 2023-07-02 RX ORDER — PIPERACILLIN AND TAZOBACTAM 4; .5 G/20ML; G/20ML
3.38 INJECTION, POWDER, LYOPHILIZED, FOR SOLUTION INTRAVENOUS ONCE
Refills: 0 | Status: COMPLETED | OUTPATIENT
Start: 2023-07-03 | End: 2023-07-03

## 2023-07-02 RX ORDER — IBUPROFEN 200 MG
1 TABLET ORAL
Qty: 0 | Refills: 0 | DISCHARGE

## 2023-07-02 RX ORDER — CHOLECALCIFEROL (VITAMIN D3) 125 MCG
1 CAPSULE ORAL
Qty: 0 | Refills: 0 | DISCHARGE

## 2023-07-02 RX ORDER — PIPERACILLIN AND TAZOBACTAM 4; .5 G/20ML; G/20ML
3.38 INJECTION, POWDER, LYOPHILIZED, FOR SOLUTION INTRAVENOUS EVERY 8 HOURS
Refills: 0 | Status: DISCONTINUED | OUTPATIENT
Start: 2023-07-03 | End: 2023-07-03

## 2023-07-02 RX ORDER — DEXLANSOPRAZOLE 30 MG/1
1 CAPSULE, DELAYED RELEASE ORAL
Qty: 0 | Refills: 0 | DISCHARGE

## 2023-07-02 RX ORDER — PIPERACILLIN AND TAZOBACTAM 4; .5 G/20ML; G/20ML
3.38 INJECTION, POWDER, LYOPHILIZED, FOR SOLUTION INTRAVENOUS ONCE
Refills: 0 | Status: COMPLETED | OUTPATIENT
Start: 2023-07-02 | End: 2023-07-02

## 2023-07-02 RX ORDER — DILTIAZEM HCL 120 MG
240 CAPSULE, EXT RELEASE 24 HR ORAL DAILY
Refills: 0 | Status: DISCONTINUED | OUTPATIENT
Start: 2023-07-02 | End: 2023-07-06

## 2023-07-02 RX ORDER — TRAMADOL HYDROCHLORIDE 50 MG/1
25 TABLET ORAL ONCE
Refills: 0 | Status: DISCONTINUED | OUTPATIENT
Start: 2023-07-02 | End: 2023-07-02

## 2023-07-02 RX ORDER — TRAMADOL HYDROCHLORIDE 50 MG/1
25 TABLET ORAL
Refills: 0 | Status: DISCONTINUED | OUTPATIENT
Start: 2023-07-02 | End: 2023-07-06

## 2023-07-02 RX ORDER — ZOLPIDEM TARTRATE 10 MG/1
5 TABLET ORAL AT BEDTIME
Refills: 0 | Status: DISCONTINUED | OUTPATIENT
Start: 2023-07-02 | End: 2023-07-06

## 2023-07-02 RX ORDER — SODIUM CHLORIDE 9 MG/ML
1000 INJECTION INTRAMUSCULAR; INTRAVENOUS; SUBCUTANEOUS ONCE
Refills: 0 | Status: COMPLETED | OUTPATIENT
Start: 2023-07-02 | End: 2023-07-02

## 2023-07-02 RX ORDER — METOPROLOL TARTRATE 50 MG
100 TABLET ORAL DAILY
Refills: 0 | Status: DISCONTINUED | OUTPATIENT
Start: 2023-07-02 | End: 2023-07-06

## 2023-07-02 RX ORDER — ATORVASTATIN CALCIUM 80 MG/1
80 TABLET, FILM COATED ORAL AT BEDTIME
Refills: 0 | Status: DISCONTINUED | OUTPATIENT
Start: 2023-07-02 | End: 2023-07-06

## 2023-07-02 RX ORDER — METOPROLOL TARTRATE 50 MG
1 TABLET ORAL
Qty: 0 | Refills: 0 | DISCHARGE

## 2023-07-02 RX ORDER — ENOXAPARIN SODIUM 100 MG/ML
40 INJECTION SUBCUTANEOUS EVERY 24 HOURS
Refills: 0 | Status: DISCONTINUED | OUTPATIENT
Start: 2023-07-02 | End: 2023-07-06

## 2023-07-02 RX ORDER — MORPHINE SULFATE 50 MG/1
2 CAPSULE, EXTENDED RELEASE ORAL ONCE
Refills: 0 | Status: DISCONTINUED | OUTPATIENT
Start: 2023-07-02 | End: 2023-07-02

## 2023-07-02 RX ORDER — ACETAMINOPHEN 500 MG
650 TABLET ORAL EVERY 6 HOURS
Refills: 0 | Status: DISCONTINUED | OUTPATIENT
Start: 2023-07-02 | End: 2023-07-06

## 2023-07-02 RX ADMIN — PIPERACILLIN AND TAZOBACTAM 25 GRAM(S): 4; .5 INJECTION, POWDER, LYOPHILIZED, FOR SOLUTION INTRAVENOUS at 18:55

## 2023-07-02 RX ADMIN — Medication 650 MILLIGRAM(S): at 14:34

## 2023-07-02 RX ADMIN — Medication 650 MILLIGRAM(S): at 08:38

## 2023-07-02 RX ADMIN — SODIUM CHLORIDE 1000 MILLILITER(S): 9 INJECTION INTRAMUSCULAR; INTRAVENOUS; SUBCUTANEOUS at 08:39

## 2023-07-02 RX ADMIN — PIPERACILLIN AND TAZOBACTAM 200 GRAM(S): 4; .5 INJECTION, POWDER, LYOPHILIZED, FOR SOLUTION INTRAVENOUS at 16:31

## 2023-07-02 RX ADMIN — Medication 650 MILLIGRAM(S): at 20:56

## 2023-07-02 RX ADMIN — Medication 650 MILLIGRAM(S): at 21:45

## 2023-07-02 RX ADMIN — Medication 100 MILLIGRAM(S): at 16:32

## 2023-07-02 RX ADMIN — ENOXAPARIN SODIUM 40 MILLIGRAM(S): 100 INJECTION SUBCUTANEOUS at 16:32

## 2023-07-02 RX ADMIN — GABAPENTIN 300 MILLIGRAM(S): 400 CAPSULE ORAL at 17:13

## 2023-07-02 RX ADMIN — TRAMADOL HYDROCHLORIDE 25 MILLIGRAM(S): 50 TABLET ORAL at 12:36

## 2023-07-02 RX ADMIN — MORPHINE SULFATE 2 MILLIGRAM(S): 50 CAPSULE, EXTENDED RELEASE ORAL at 08:53

## 2023-07-02 RX ADMIN — ATORVASTATIN CALCIUM 80 MILLIGRAM(S): 80 TABLET, FILM COATED ORAL at 21:01

## 2023-07-02 RX ADMIN — CEFTRIAXONE 100 MILLIGRAM(S): 500 INJECTION, POWDER, FOR SOLUTION INTRAMUSCULAR; INTRAVENOUS at 11:09

## 2023-07-02 NOTE — ED ADULT NURSE NOTE - NSFALLUNIVINTERV_ED_ALL_ED
Bed/Stretcher in lowest position, wheels locked, appropriate side rails in place/Call bell, personal items and telephone in reach/Instruct patient to call for assistance before getting out of bed/chair/stretcher/Non-slip footwear applied when patient is off stretcher/[place] to call system/Physically safe environment - no spills, clutter or unnecessary equipment/Purposeful proactive rounding/Room/bathroom lighting operational, light cord in reach

## 2023-07-02 NOTE — ED PROVIDER NOTE - CLINICAL SUMMARY MEDICAL DECISION MAKING FREE TEXT BOX
63-year-old male presents to the emergency department for flank pain.  In the emergency department screening exam, labs and imaging, found to have pyelonephritis with elevated white blood cell count and tachycardia.  Patient is clinically well, is will be admitted to inpatient setting.  No need for advanced sepsis care. At the time of disposition, results of work-up discussed with patient and family with questions answered, expressed understanding of the medical decision making.

## 2023-07-02 NOTE — ED PROVIDER NOTE - ATTENDING CONTRIBUTION TO CARE
I personally evaluated the patient. I reviewed the Resident´s or Physician Assistant´s note (as assigned above), and agree with the findings and plan except as documented in my note.    63-year-old male presents to the emergency department for fever chills and low back flank pain, concern for UTI also has history of nephrolithiases in the past.  PMD urologist is local to our facility.    The review of systems otherwise unremarkable    GENERAL: Male in no distress.   HEENT: EOMI non icteric  CHEST: normal work of breathing noted  CV: pulses intact   EXTR: FROM   Back: No CVA tenderness  ABD: soft,  non rigid, no guarding  NEURO: AAO 3 no focal deficits  SKIN: normal no pallor  PSYCH: normal mood & mentation    Impression: Flank pain      Plan: IV labs imaging supportive care and reevaluation

## 2023-07-02 NOTE — H&P ADULT - ASSESSMENT
64 yo man pmhx nephrolithiasis and recurrent UTIs, HTN ?tachyarrhythmia, MDD, presents w/ BL flank pain    # pyelonephritis  - no sepsis but hx of recurrent UTI + stones  - c/w zosyn 3.125 iv q6  - f/u cx  - f/u ID cs    # tachyarrhythmia   # HTN  - pt on metoprolol and cardizem  - should f/u OP EP    # chronic back pain  # Chronic L Hip pain pending L hip replacement  - c/w home gabapentin; c/w tramadol     # MDD  - c/w home venlafaxine     # GERD  - c/w home pantoprazole     DVT lovenox  Diet dash/tlc

## 2023-07-02 NOTE — H&P ADULT - NSHPREVIEWOFSYSTEMS_GEN_ALL_CORE
General:	denies night sweats, wt changes, cold/heat intolerance    Skin: denies rashes, pruritis, nail/hair changes  	  Ophthalmologic: denies vision changes, denies eye discharge or irritation  	  ENMT: denies nasal discharge, hearing changes, mouth sores, difficulty swallowing    Respiratory and Thorax:denies cough, denies difficulty breathing, denies shortness of breath  	  Cardiovascular: denies CP, denies JAIME, denies palpitation    Gastrointestinal: denies n/v/d    Genitourinary: endorses urgency, burning, denies nocturia    Musculoskeletal: chronic L hip pain    Neurological: denies dizziness, syncope, HA    Psychiatric: denies si/hi and hallucinations    Hematology/Lymphatics: denies easy bleeding/bruising    Endocrine: denies wt changes, hair/nail changes, denies cold/heat sensitivity

## 2023-07-02 NOTE — ED PROVIDER NOTE - OBJECTIVE STATEMENT
63-year-old male past medical history of left hip replacement on meloxicam, kidney stones, and UTIs coming in with complaints of flank pain since yesterday.  Reports that it is bilateral, nonradiating.  Associated with chills, increased urinary frequency, as well as darker colored urine.  Denies any dysuria.  Believes that this is a UTI that is worse. Has surgical history of hernia repair. Does not feel like his prior kidney stones. Denies any fever, nausea, vomiting, CP, SOB, or changes in bowel movements.

## 2023-07-02 NOTE — H&P ADULT - HISTORY OF PRESENT ILLNESS
64 yo man pmhx nephrolithiasis and recurrent UTIs, ?tachyarrhythmia, MDD, presents w/ BL flank pain    Per pt he was in his usual state of health prior to yesterday morning when he began to have slight pain after voiding; sxs progressed to BL flank pain and subjective fevers and weakness. Pt denies hematuria, n/v/d, cp, sob;     ED  /74  RR 18 96% RA T 98.9 (TMax 101.2)  WBC 16.58 AST 57 ALT 60 Trop negative Lac 1.5 Lipase 23  CT abd suggestive of pyelo, no evidence of stones    Pt was given ceftriaxone, 1L bolus, ultram 25 and morphine 2 and admitted to medicine

## 2023-07-02 NOTE — PATIENT PROFILE ADULT - FALL HARM RISK - UNIVERSAL INTERVENTIONS
Bed in lowest position, wheels locked, appropriate side rails in place/Call bell, personal items and telephone in reach/Instruct patient to call for assistance before getting out of bed or chair/Non-slip footwear when patient is out of bed/[place] to call system/Physically safe environment - no spills, clutter or unnecessary equipment/Purposeful Proactive Rounding/Room/bathroom lighting operational, light cord in reach

## 2023-07-02 NOTE — H&P ADULT - ATTENDING COMMENTS
62 yo man pmhx nephrolithiasis and recurrent UTIs, HTN ?tachyarrhythmia, MDD, presents w/ BL flank pain    # pyelonephritis  - sepsis on admission (leukocytosis + tachycardia + fever) 2/2 UTI  - s/p 1 L NS bolus in ED.  - c/w zosyn 3.125 iv q6  - f/u cx  - f/u ID cs    # tachyarrhythmia   # HTN  - pt on metoprolol and cardizem  - should f/u OP EP    # chronic back pain  # Chronic L Hip pain pending L hip replacement  - c/w home gabapentin; c/w tramadol     # MDD  - c/w home venlafaxine     # GERD  - c/w home pantoprazole     DVT lovenox  Diet dash/tlc

## 2023-07-02 NOTE — H&P ADULT - NSHPSOCIALHISTORY_GEN_ALL_CORE
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

## 2023-07-02 NOTE — H&P ADULT - NSHPLABSRESULTS_GEN_ALL_CORE
LABS:                        13.0   16.58 )-----------( 241      ( 02 Jul 2023 09:01 )             38.4     07-02    137  |  102  |  19  ----------------------------<  119<H>  4.3   |  23  |  1.3    Ca    9.3      02 Jul 2023 09:01    TPro  6.5  /  Alb  4.3  /  TBili  0.6  /  DBili  x   /  AST  57<H>  /  ALT  60<H>  /  AlkPhos  59  07-02      Urinalysis Basic - ( 02 Jul 2023 10:13 )    Color: Light Yellow / Appearance: Clear / SG: >1.050 / pH: x  Gluc: x / Ketone: Negative  / Bili: Negative / Urobili: <2 mg/dL   Blood: x / Protein: 30 mg/dL / Nitrite: Positive   Leuk Esterase: Moderate / RBC: 5 /HPF / WBC 28 /HPF   Sq Epi: x / Non Sq Epi: x / Bacteria: Many        Lactate, Blood: 1.5 mmol/L (07-02-23 @ 09:01)  Troponin T, Serum: <0.01 ng/mL (07-02-23 @ 09:01)      CARDIAC MARKERS ( 02 Jul 2023 09:01 )  x     / <0.01 ng/mL / x     / x     / x          RADIOLOGY:    < from: CT Abdomen and Pelvis w/ IV Cont (07.02.23 @ 08:52) >      IMPRESSION:  Left renal multifocal cortical areas of hypoenhancement and perinephric   stranding suspicious for acute pyelonephritis. No evidence of radiopaque   obstructing stone.    --- End of Report ---      < end of copied text >        VITALS:   T(F): 102.2  HR: 122  BP: 130/60  RR: 20  SpO2: 96%

## 2023-07-02 NOTE — H&P ADULT - NSHPPHYSICALEXAM_GEN_ALL_CORE
CONSTITUTIONAL: NAD    EYES: PERRLA and symmetric, EOMI, No conjunctival or scleral injection, non-icteric    ENMT: Oral mucosa with moist membranes. No external nasal lesions; normal dentition; no gross hearing impairment noted.    NECK: Supple, symmetric and without tracheal deviation; thyroid gland not enlarged and without palpable masses    RESPIRATORY: No respiratory distress, no use of accessory muscles; CTA b/l, no wheezes, rales or rhonchi, no dullness or hyperresonance to percussion, no tactile fremitus, no subcutaneous emphysema    CARDIOVASCULAR: regular tachy, +S1S2, no murmur appreciated, no rubs, no gallops; no JVD; no peripheral edema    Vascular: no carotid bruits; carotid pulse palpable, radial pulse palpable, posterior tibialis pulse palpable    GASTROINTESTINAL: Soft, non tender, mildly distended and tympanic, no rebound, no guarding; No palpable masses; no hepatosplenomegaly; no hernia palpated;    MUSCULOSKELETAL: no significant limitation on ROM, moves all extremities in plane of bed    SKIN: No rashes or ulcers noted; no subcutaneous nodules or induration palpable    NEUROLOGIC: moves all extremities against resistance, no droop    PSYCHIATRIC: Appropriate insight/judgment; A+O x 3, mood and affect appropriate, recent/remote memory intact

## 2023-07-02 NOTE — ED PROVIDER NOTE - PHYSICAL EXAMINATION
Gen: Alert, NAD, well appearing  Head: NC, AT, EOMI,  Pulm: Bilateral BS, normal resp effort, no wheeze/stridor/retractions  CV: RRR, no M/R/G, +dist pulses  Abd: soft, NT/ND, suprapubic tender, neg CVAT  Mskel: no edema/erythema/cyanosis  Skin: no rash, warm/dry  Neuro: AAOx3, no sensory/motor deficits, CN 2-12 grossly intact

## 2023-07-03 DIAGNOSIS — N12 TUBULO-INTERSTITIAL NEPHRITIS, NOT SPECIFIED AS ACUTE OR CHRONIC: ICD-10-CM

## 2023-07-03 LAB
A1C WITH ESTIMATED AVERAGE GLUCOSE RESULT: 5.7 % — HIGH (ref 4–5.6)
ALBUMIN SERPL ELPH-MCNC: 3.4 G/DL — LOW (ref 3.5–5.2)
ALP SERPL-CCNC: 56 U/L — SIGNIFICANT CHANGE UP (ref 30–115)
ALT FLD-CCNC: 48 U/L — HIGH (ref 0–41)
ANION GAP SERPL CALC-SCNC: 12 MMOL/L — SIGNIFICANT CHANGE UP (ref 7–14)
APTT BLD: 35.9 SEC — SIGNIFICANT CHANGE UP (ref 27–39.2)
AST SERPL-CCNC: 35 U/L — SIGNIFICANT CHANGE UP (ref 0–41)
BASOPHILS # BLD AUTO: 0.02 K/UL — SIGNIFICANT CHANGE UP (ref 0–0.2)
BASOPHILS NFR BLD AUTO: 0.2 % — SIGNIFICANT CHANGE UP (ref 0–1)
BILIRUB SERPL-MCNC: 0.7 MG/DL — SIGNIFICANT CHANGE UP (ref 0.2–1.2)
BUN SERPL-MCNC: 12 MG/DL — SIGNIFICANT CHANGE UP (ref 10–20)
CALCIUM SERPL-MCNC: 8.9 MG/DL — SIGNIFICANT CHANGE UP (ref 8.4–10.5)
CHLORIDE SERPL-SCNC: 102 MMOL/L — SIGNIFICANT CHANGE UP (ref 98–110)
CHOLEST SERPL-MCNC: 96 MG/DL — SIGNIFICANT CHANGE UP
CO2 SERPL-SCNC: 21 MMOL/L — SIGNIFICANT CHANGE UP (ref 17–32)
CREAT SERPL-MCNC: 1.2 MG/DL — SIGNIFICANT CHANGE UP (ref 0.7–1.5)
EGFR: 68 ML/MIN/1.73M2 — SIGNIFICANT CHANGE UP
EOSINOPHIL # BLD AUTO: 0 K/UL — SIGNIFICANT CHANGE UP (ref 0–0.7)
EOSINOPHIL NFR BLD AUTO: 0 % — SIGNIFICANT CHANGE UP (ref 0–8)
ESTIMATED AVERAGE GLUCOSE: 117 MG/DL — HIGH (ref 68–114)
GLUCOSE SERPL-MCNC: 97 MG/DL — SIGNIFICANT CHANGE UP (ref 70–99)
HCT VFR BLD CALC: 36.2 % — LOW (ref 42–52)
HCV AB S/CO SERPL IA: 0.04 COI — SIGNIFICANT CHANGE UP
HCV AB SERPL-IMP: SIGNIFICANT CHANGE UP
HDLC SERPL-MCNC: 30 MG/DL — LOW
HGB BLD-MCNC: 12 G/DL — LOW (ref 14–18)
IMM GRANULOCYTES NFR BLD AUTO: 0.6 % — HIGH (ref 0.1–0.3)
INR BLD: 1.22 RATIO — SIGNIFICANT CHANGE UP (ref 0.65–1.3)
LIPID PNL WITH DIRECT LDL SERPL: 34 MG/DL — SIGNIFICANT CHANGE UP
LYMPHOCYTES # BLD AUTO: 0.67 K/UL — LOW (ref 1.2–3.4)
LYMPHOCYTES # BLD AUTO: 7.5 % — LOW (ref 20.5–51.1)
MAGNESIUM SERPL-MCNC: 1.6 MG/DL — LOW (ref 1.8–2.4)
MCHC RBC-ENTMCNC: 31.3 PG — HIGH (ref 27–31)
MCHC RBC-ENTMCNC: 33.1 G/DL — SIGNIFICANT CHANGE UP (ref 32–37)
MCV RBC AUTO: 94.3 FL — HIGH (ref 80–94)
MONOCYTES # BLD AUTO: 0.67 K/UL — HIGH (ref 0.1–0.6)
MONOCYTES NFR BLD AUTO: 7.5 % — SIGNIFICANT CHANGE UP (ref 1.7–9.3)
NEUTROPHILS # BLD AUTO: 7.52 K/UL — HIGH (ref 1.4–6.5)
NEUTROPHILS NFR BLD AUTO: 84.2 % — HIGH (ref 42.2–75.2)
NON HDL CHOLESTEROL: 66 MG/DL — SIGNIFICANT CHANGE UP
NRBC # BLD: 0 /100 WBCS — SIGNIFICANT CHANGE UP (ref 0–0)
PLATELET # BLD AUTO: 186 K/UL — SIGNIFICANT CHANGE UP (ref 130–400)
PMV BLD: 10 FL — SIGNIFICANT CHANGE UP (ref 7.4–10.4)
POTASSIUM SERPL-MCNC: 4.2 MMOL/L — SIGNIFICANT CHANGE UP (ref 3.5–5)
POTASSIUM SERPL-SCNC: 4.2 MMOL/L — SIGNIFICANT CHANGE UP (ref 3.5–5)
PROT SERPL-MCNC: 5.6 G/DL — LOW (ref 6–8)
PROTHROM AB SERPL-ACNC: 14 SEC — HIGH (ref 9.95–12.87)
RBC # BLD: 3.84 M/UL — LOW (ref 4.7–6.1)
RBC # FLD: 13.2 % — SIGNIFICANT CHANGE UP (ref 11.5–14.5)
SODIUM SERPL-SCNC: 135 MMOL/L — SIGNIFICANT CHANGE UP (ref 135–146)
TRIGL SERPL-MCNC: 163 MG/DL — HIGH
WBC # BLD: 8.93 K/UL — SIGNIFICANT CHANGE UP (ref 4.8–10.8)
WBC # FLD AUTO: 8.93 K/UL — SIGNIFICANT CHANGE UP (ref 4.8–10.8)

## 2023-07-03 PROCEDURE — 99233 SBSQ HOSP IP/OBS HIGH 50: CPT

## 2023-07-03 PROCEDURE — 93010 ELECTROCARDIOGRAM REPORT: CPT

## 2023-07-03 RX ORDER — MEROPENEM 1 G/30ML
1000 INJECTION INTRAVENOUS EVERY 8 HOURS
Refills: 0 | Status: DISCONTINUED | OUTPATIENT
Start: 2023-07-03 | End: 2023-07-06

## 2023-07-03 RX ORDER — MEROPENEM 1 G/30ML
INJECTION INTRAVENOUS
Refills: 0 | Status: DISCONTINUED | OUTPATIENT
Start: 2023-07-03 | End: 2023-07-06

## 2023-07-03 RX ORDER — MAGNESIUM SULFATE 500 MG/ML
2 VIAL (ML) INJECTION ONCE
Refills: 0 | Status: COMPLETED | OUTPATIENT
Start: 2023-07-03 | End: 2023-07-03

## 2023-07-03 RX ORDER — MEROPENEM 1 G/30ML
1000 INJECTION INTRAVENOUS ONCE
Refills: 0 | Status: COMPLETED | OUTPATIENT
Start: 2023-07-03 | End: 2023-07-03

## 2023-07-03 RX ADMIN — Medication 650 MILLIGRAM(S): at 14:41

## 2023-07-03 RX ADMIN — PIPERACILLIN AND TAZOBACTAM 25 GRAM(S): 4; .5 INJECTION, POWDER, LYOPHILIZED, FOR SOLUTION INTRAVENOUS at 08:02

## 2023-07-03 RX ADMIN — GABAPENTIN 300 MILLIGRAM(S): 400 CAPSULE ORAL at 06:01

## 2023-07-03 RX ADMIN — ENOXAPARIN SODIUM 40 MILLIGRAM(S): 100 INJECTION SUBCUTANEOUS at 17:38

## 2023-07-03 RX ADMIN — PANTOPRAZOLE SODIUM 40 MILLIGRAM(S): 20 TABLET, DELAYED RELEASE ORAL at 06:01

## 2023-07-03 RX ADMIN — Medication 650 MILLIGRAM(S): at 20:51

## 2023-07-03 RX ADMIN — ATORVASTATIN CALCIUM 80 MILLIGRAM(S): 80 TABLET, FILM COATED ORAL at 21:28

## 2023-07-03 RX ADMIN — GABAPENTIN 300 MILLIGRAM(S): 400 CAPSULE ORAL at 17:38

## 2023-07-03 RX ADMIN — MEROPENEM 100 MILLIGRAM(S): 1 INJECTION INTRAVENOUS at 21:25

## 2023-07-03 RX ADMIN — Medication 650 MILLIGRAM(S): at 09:50

## 2023-07-03 RX ADMIN — MEROPENEM 100 MILLIGRAM(S): 1 INJECTION INTRAVENOUS at 15:09

## 2023-07-03 RX ADMIN — Medication 240 MILLIGRAM(S): at 06:01

## 2023-07-03 RX ADMIN — PIPERACILLIN AND TAZOBACTAM 25 GRAM(S): 4; .5 INJECTION, POWDER, LYOPHILIZED, FOR SOLUTION INTRAVENOUS at 02:36

## 2023-07-03 RX ADMIN — Medication 25 GRAM(S): at 16:39

## 2023-07-03 RX ADMIN — Medication 650 MILLIGRAM(S): at 08:06

## 2023-07-03 RX ADMIN — Medication 100 MILLIGRAM(S): at 06:01

## 2023-07-03 NOTE — CONSULT NOTE ADULT - ASSESSMENT
ASSESSMENT  63y M admitted with Tubulointerstitial nephritis. Patient with improved symptoms this morning with resolution of flank pain and improved malaise s/p cefriaxone x1 and zosyn. Urine culture from 6/27/23 showing ESBL.      OA (osteoarthritis)    HTN (hypertension)    Hypercholesterolemia    Depression    Backache ASSESSMENT  63y M admitted with Tubulointerstitial nephritis. Pt with Sepsis on admission (Tmax 102.2F, Pulse 128, WBC 16.88,) with pyelonephritis. Patient with improved symptoms this morning with resolution of flank pain and improved malaise s/p cefriaxone x1 and zosyn. Urine culture from 6/27/23 showing ESBL sensitive to meropenem.     RECOMMENDATIONS  - f/u pending cultures  - D/c zosyn  - Start Meropenem 1g q8hrs in light of ESBL in prior culture    Case discussed with Dr. Corona. ASSESSMENT  63y M admitted with Tubulointerstitial nephritis. Pt with Sepsis on admission (Tmax 102.2F, Pulse 128, WBC 16.88,) with pyelonephritis. Patient with improved symptoms this morning with resolution of flank pain and improved malaise s/p cefriaxone x1 and zosyn. Urine culture from 6/27/23 showing ESBL sensitive to meropenem.     RECOMMENDATIONS  - f/u pending cultures  - Start Meropenem 1g q8hrs  - D/c zosyn

## 2023-07-03 NOTE — CONSULT NOTE ADULT - SUBJECTIVE AND OBJECTIVE BOX
ANGLE KING  63y, Male  Allergy: Plums (Other)  Drug Allergies Not Recorded  Cherries (Other)  Peaches (Other)      CHIEF COMPLAINT:     HPI:  64 yo man pmhx nephrolithiasis and recurrent UTIs, ?tachyarrhythmia, MDD, presents w/ BL flank pain    Per pt he was in his usual state of health prior to yesterday morning when he began to have slight pain after voiding; sxs progressed to BL flank pain and subjective fevers and weakness. Pt denies hematuria, n/v/d, cp, sob;     ED  /74  RR 18 96% RA T 98.9 (TMax 101.2)  WBC 16.58 AST 57 ALT 60 Trop negative Lac 1.5 Lipase 23  CT abd suggestive of pyelo, no evidence of stones    Pt was given ceftriaxone, 1L bolus, ultram 25 and morphine 2 and admitted to medicine (02 Jul 2023 14:47).    Started on Zosyn. Patient reports improvement of symptoms with resolved flank pain and     FAMILY HISTORY:    PAST MEDICAL & SURGICAL HISTORY:  OA (osteoarthritis)      HTN (hypertension)      Hypercholesterolemia      Depression  PT DENIES SUICIDAL IDEATION      Backache      H/O hernia repair      History of open reduction and internal fixation (ORIF) procedure  RIGHT LEG      H/O hemorrhoids          SOCIAL HISTORY    Substance Use (  ) never used  (  ) IVDU (  ) Other:  Tobacco Usage:  (   ) never smoked   (   ) former smoker   (   ) current smoker   Alcohol Usage: (   ) social  (   ) daily use (   ) denies  Sexual History:       ROS  General: Denies rigors, nightsweats  HEENT: Denies headache, rhinorrhea, sore throat, eye pain  CV: Denies CP, palpitations  PULM: Denies wheezing, hemoptysis  GI: Denies hematemesis, hematochezia, melena  : Denies discharge, hematuria  MSK: Denies arthralgias, myalgias  SKIN: Denies rash, lesions  NEURO: Denies paresthesias, weakness  PSYCH: Denies depression, anxiety    VITALS:  T(F): 98.7, Max: 102.2 (07-02-23 @ 13:42)  HR: 104  BP: 121/65  RR: 18Vital Signs Last 24 Hrs  T(C): 37.1 (03 Jul 2023 09:43), Max: 39 (02 Jul 2023 13:42)  T(F): 98.7 (03 Jul 2023 09:43), Max: 102.2 (02 Jul 2023 13:42)  HR: 104 (03 Jul 2023 05:20) (104 - 122)  BP: 121/65 (03 Jul 2023 05:20) (114/65 - 130/60)  BP(mean): 87 (03 Jul 2023 05:20) (84 - 87)  RR: 18 (03 Jul 2023 05:20) (18 - 20)  SpO2: --    Parameters below as of 03 Jul 2023 05:20  Patient On (Oxygen Delivery Method): room air        PHYSICAL EXAM:  Gen: NAD, resting in bed  HEENT: Normocephalic, atraumatic  Neck: supple, no lymphadenopathy  CV: Regular rate & regular rhythm  Lungs: decreased BS at bases  Abdomen: Soft, BS present  Ext: Warm, well perfused  Neuro: non focal, awake  Skin: no rash, no erythema    TESTS & MEASUREMENTS:                        12.0   8.93  )-----------( 186      ( 03 Jul 2023 07:45 )             36.2     07-03    135  |  102  |  12  ----------------------------<  97  4.2   |  21  |  1.2    Ca    8.9      03 Jul 2023 07:45  Mg     1.6     07-03    TPro  5.6<L>  /  Alb  3.4<L>  /  TBili  0.7  /  DBili  x   /  AST  35  /  ALT  48<H>  /  AlkPhos  56  07-03      LIVER FUNCTIONS - ( 03 Jul 2023 07:45 )  Alb: 3.4 g/dL / Pro: 5.6 g/dL / ALK PHOS: 56 U/L / ALT: 48 U/L / AST: 35 U/L / GGT: x           Urinalysis Basic - ( 03 Jul 2023 07:45 )    Color: x / Appearance: x / SG: x / pH: x  Gluc: 97 mg/dL / Ketone: x  / Bili: x / Urobili: x   Blood: x / Protein: x / Nitrite: x   Leuk Esterase: x / RBC: x / WBC x   Sq Epi: x / Non Sq Epi: x / Bacteria: x          Lactate, Blood: 1.5 mmol/L (07-02-23 @ 09:01)      INFECTIOUS DISEASES TESTING      RADIOLOGY & ADDITIONAL TESTS:  I have personally reviewed the last Chest xray  CXR      CT  CT Abdomen and Pelvis w/ IV Cont:   ACC: [id number] EXAM:  CT ABDOMEN AND PELVIS IC   ORDERED BY: AUTUMN LOZANO     PROCEDURE DATE:  07/02/2023          INTERPRETATION:  CLINICAL STATEMENT: Abdominal pain. abd pain    TECHNIQUE: Contiguous axial CT images were obtained of the abdomen and   pelvis following administration of intravenous contrast.  Oral contrast   was not administered. Reformatted images in the coronal and sagittal   planes were acquired.    COMPARISON CT: 5/22/2022    FINDINGS:    LOWER CHEST: Bibasilar subsegmental atelectasis.    HEPATOBILIARY: Diffuse hypoattenuation of the liver which may be seen   with hepatic steatosis.. Cholelithiasis. Subcentimeter density too small   to characterize.    SPLEEN: Within normal limits.    ADRENALS: Right adrenal glandnodularity without significant change    PANCREAS: Within normal limits.    KIDNEYS: Left renal cyst with peripheral calcification. No right   hydronephrosis. Right renal subcentimeter hypodensity too small to   characterize. Left renal subcentimeterhypodensities too small to   characterize. Left renal perinephric stranding and proximal ureteral wall   enhancement. Multifocal cortical areas of hypoenhancement within the left   kidney. Left kidney extrarenal pelvis versus mild fullness.    ABDOMINOPELVIC NODES: No enlarged abdominal or pelvic lymph nodes.    PELVIC ORGANS: Enlarged prostate.    PERITONEUM/MESENTERY/BOWEL: No bowel obstruction, ascites or free   intraperitoneal air. Colonic diverticulosis. Small hiatal hernia. Ventral   herniarepair. Normal caliber appendix.    BONES/SOFT TISSUES: Degenerative changes of the spine.  Right femoral   fixation hardware. Right fat-containing inguinal hernia.      IMPRESSION:  Left renal multifocal cortical areas of hypoenhancement and perinephric   stranding suspicious for acute pyelonephritis. No evidence of radiopaque   obstructing stone.    --- End of Report ---            SUDHIR WILBURN MD; Attending Radiologist  This document has been electronically signed. Jul 2 2023 11:07AM (07-02-23 @ 08:52)      CARDIOLOGY TESTING  12 Lead ECG:   Ventricular Rate 127 BPM    Atrial Rate 127 BPM    P-R Interval 150 ms    QRS Duration 76 ms    Q-T Interval 298 ms    QTC Calculation(Bazett) 433 ms    P Axis 65 degrees    R Axis 41 degrees    T Axis 52 degrees    Diagnosis Line Sinus tachycardia  Otherwise normal ECG    Confirmed by Greg Kyle ([id number]) on 7/3/2023 8:40:15 AM (07-02-23 @ 08:09)      MEDICATIONS  atorvastatin 80  diltiazem     enoxaparin Injectable 40  gabapentin 300  metoprolol succinate   pantoprazole    Tablet 40  piperacillin/tazobactam IVPB.. 3.375      ANTIBIOTICS:  piperacillin/tazobactam IVPB.. 3.375 Gram(s) IV Intermittent every 8 hours             ANGLE KING  63y, Male  Allergy: Plums (Other)  Drug Allergies Not Recorded  Cherries (Other)  Peaches (Other)      CHIEF COMPLAINT: subjective fevers and flank pain    HPI:  62 yo man pmhx nephrolithiasis and recurrent UTIs, ?tachyarrhythmia, MDD, presents w/ BL flank pain    Per pt he was in his usual state of health prior to yesterday morning when he began to have slight pain after voiding; sxs progressed to BL flank pain and subjective fevers and weakness. Pt denies hematuria, n/v/d, cp, sob;     ED  /74  RR 18 96% RA T 98.9 (TMax 101.2)  WBC 16.58 AST 57 ALT 60 Trop negative Lac 1.5 Lipase 23  CT abd suggestive of pyelo, no evidence of stones    Pt was given ceftriaxone, 1L bolus, ultram 25 and morphine 2 and admitted to medicine (02 Jul 2023 14:47).    Started on Zosyn. Patient reports improvement of symptoms with resolved flank pain and     FAMILY HISTORY:    PAST MEDICAL & SURGICAL HISTORY:  OA (osteoarthritis)      HTN (hypertension)      Hypercholesterolemia      Depression  PT DENIES SUICIDAL IDEATION      Backache      H/O hernia repair      History of open reduction and internal fixation (ORIF) procedure  RIGHT LEG      H/O hemorrhoids          SOCIAL HISTORY    Substance Use (X ) never used  (  ) IVDU (  ) Other:  Tobacco Usage:  ( X ) never smoked   (   ) former smoker   (   ) current smoker   Alcohol Usage: ( X ) social  (   ) daily use (   ) denies  Sexual History: No new sexual partners      ROS  General: Denies rigors, nightsweats  HEENT: Denies headache, rhinorrhea, sore throat, eye pain  CV: Denies CP, palpitations  PULM: Denies wheezing, hemoptysis  GI: Denies hematemesis, hematochezia, melena  : Denies discharge, hematuria  MSK: Denies arthralgias, myalgias  SKIN: Denies rash, lesions  NEURO: Denies paresthesias, weakness  PSYCH: Denies depression, anxiety    VITALS:  T(F): 98.7, Max: 102.2 (07-02-23 @ 13:42)  HR: 104  BP: 121/65  RR: 18Vital Signs Last 24 Hrs  T(C): 37.1 (03 Jul 2023 09:43), Max: 39 (02 Jul 2023 13:42)  T(F): 98.7 (03 Jul 2023 09:43), Max: 102.2 (02 Jul 2023 13:42)  HR: 104 (03 Jul 2023 05:20) (104 - 122)  BP: 121/65 (03 Jul 2023 05:20) (114/65 - 130/60)  BP(mean): 87 (03 Jul 2023 05:20) (84 - 87)  RR: 18 (03 Jul 2023 05:20) (18 - 20)  SpO2: --    Parameters below as of 03 Jul 2023 05:20  Patient On (Oxygen Delivery Method): room air        PHYSICAL EXAM:  Gen: NAD, resting in bed  HEENT: Normocephalic, atraumatic  Neck: supple, no lymphadenopathy  CV: Regular rate & regular rhythm  Lungs: decreased BS at bases  Abdomen: Soft, BS present  Ext: Warm, well perfused  Neuro: non focal, awake  Skin: no rash, no erythema    TESTS & MEASUREMENTS:                        12.0   8.93  )-----------( 186      ( 03 Jul 2023 07:45 )             36.2     07-03    135  |  102  |  12  ----------------------------<  97  4.2   |  21  |  1.2    Ca    8.9      03 Jul 2023 07:45  Mg     1.6     07-03    TPro  5.6<L>  /  Alb  3.4<L>  /  TBili  0.7  /  DBili  x   /  AST  35  /  ALT  48<H>  /  AlkPhos  56  07-03      LIVER FUNCTIONS - ( 03 Jul 2023 07:45 )  Alb: 3.4 g/dL / Pro: 5.6 g/dL / ALK PHOS: 56 U/L / ALT: 48 U/L / AST: 35 U/L / GGT: x           Urinalysis Basic - ( 03 Jul 2023 07:45 )    Color: x / Appearance: x / SG: x / pH: x  Gluc: 97 mg/dL / Ketone: x  / Bili: x / Urobili: x   Blood: x / Protein: x / Nitrite: x   Leuk Esterase: x / RBC: x / WBC x   Sq Epi: x / Non Sq Epi: x / Bacteria: x          Lactate, Blood: 1.5 mmol/L (07-02-23 @ 09:01)      INFECTIOUS DISEASES TESTING      RADIOLOGY & ADDITIONAL TESTS:  I have personally reviewed the last Chest xray  CXR      CT  CT Abdomen and Pelvis w/ IV Cont:   ACC: [id number] EXAM:  CT ABDOMEN AND PELVIS IC   ORDERED BY: AUTUMN LOZANO     PROCEDURE DATE:  07/02/2023          INTERPRETATION:  CLINICAL STATEMENT: Abdominal pain. abd pain    TECHNIQUE: Contiguous axial CT images were obtained of the abdomen and   pelvis following administration of intravenous contrast.  Oral contrast   was not administered. Reformatted images in the coronal and sagittal   planes were acquired.    COMPARISON CT: 5/22/2022    FINDINGS:    LOWER CHEST: Bibasilar subsegmental atelectasis.    HEPATOBILIARY: Diffuse hypoattenuation of the liver which may be seen   with hepatic steatosis.. Cholelithiasis. Subcentimeter density too small   to characterize.    SPLEEN: Within normal limits.    ADRENALS: Right adrenal glandnodularity without significant change    PANCREAS: Within normal limits.    KIDNEYS: Left renal cyst with peripheral calcification. No right   hydronephrosis. Right renal subcentimeter hypodensity too small to   characterize. Left renal subcentimeterhypodensities too small to   characterize. Left renal perinephric stranding and proximal ureteral wall   enhancement. Multifocal cortical areas of hypoenhancement within the left   kidney. Left kidney extrarenal pelvis versus mild fullness.    ABDOMINOPELVIC NODES: No enlarged abdominal or pelvic lymph nodes.    PELVIC ORGANS: Enlarged prostate.    PERITONEUM/MESENTERY/BOWEL: No bowel obstruction, ascites or free   intraperitoneal air. Colonic diverticulosis. Small hiatal hernia. Ventral   herniarepair. Normal caliber appendix.    BONES/SOFT TISSUES: Degenerative changes of the spine.  Right femoral   fixation hardware. Right fat-containing inguinal hernia.      IMPRESSION:  Left renal multifocal cortical areas of hypoenhancement and perinephric   stranding suspicious for acute pyelonephritis. No evidence of radiopaque   obstructing stone.    --- End of Report ---            SUDHIR WILBURN MD; Attending Radiologist  This document has been electronically signed. Jul 2 2023 11:07AM (07-02-23 @ 08:52)      CARDIOLOGY TESTING  12 Lead ECG:   Ventricular Rate 127 BPM    Atrial Rate 127 BPM    P-R Interval 150 ms    QRS Duration 76 ms    Q-T Interval 298 ms    QTC Calculation(Bazett) 433 ms    P Axis 65 degrees    R Axis 41 degrees    T Axis 52 degrees    Diagnosis Line Sinus tachycardia  Otherwise normal ECG    Confirmed by Greg Kyle ([id number]) on 7/3/2023 8:40:15 AM (07-02-23 @ 08:09)      MEDICATIONS  atorvastatin 80  diltiazem     enoxaparin Injectable 40  gabapentin 300  metoprolol succinate   pantoprazole    Tablet 40  piperacillin/tazobactam IVPB.. 3.375      ANTIBIOTICS:  piperacillin/tazobactam IVPB.. 3.375 Gram(s) IV Intermittent every 8 hours

## 2023-07-03 NOTE — CONSULT NOTE ADULT - PROBLEM SELECTOR RECOMMENDATION 9
Pt with Sepsis on admission (Tmax 102.2F, Pulse 128, WBC 16.88,) with pyelonephritis. Urine culture from 6/27/23 ([facility]) show ESBL sensitive to meropenem.     RECOMMENDATIONS  - f/u pending cultures  - Start Meropenem 1g q8hrs in light of ESBL in prior culture  - D/c zosyn    Case discussed with Dr. Corona.

## 2023-07-03 NOTE — PROGRESS NOTE ADULT - ASSESSMENT
64 yo man pmhx nephrolithiasis and recurrent UTIs    # pyelonephritis  - seen by ID  - switched to merrem    # HTN  - metoprolol and cardizem  - f/u  EP as outpt    # chronic back pain  # Chronic L Hip pain  -  outpt f/u    # depression  - on  venlafaxine     pending cultures

## 2023-07-03 NOTE — CONSULT NOTE ADULT - ATTENDING COMMENTS
I have personally seen and examined this patient.  I have fully participated in the care of this patient.  I have reviewed all pertinent clinical information, including history, physical exam, plan and note.  I have reviewed all pertinent clinical information and reviewed all relevant imaging and diagnostic studies personally.  My addendum includes the final recommendations and supersedes the resident's note.       #Pyelonephritis with Sepsis on admission T>101 P>90 WBC 16  6/27 UCX esbl ecoli  < from: CT Abdomen and Pelvis w/ IV Cont (07.02.23 @ 08:52) >  Left renal multifocal cortical areas of hypoenhancement and perinephric   stranding suspicious for acute pyelonephritis. No evidence of radiopaque   obstructing stone.  #Atelectasis    - f/u UCX  - Lolita as above  - f/u BCX    If any questions, please call or send a message on [employer] Teams  Please continue to update ID with any pertinent new laboratory or radiographic findings  Spectra [phone]

## 2023-07-04 LAB
-  CTX-M RESISTANCE MARKER: SIGNIFICANT CHANGE UP
-  ESBL: SIGNIFICANT CHANGE UP
ALBUMIN SERPL ELPH-MCNC: 3.6 G/DL — SIGNIFICANT CHANGE UP (ref 3.5–5.2)
ALP SERPL-CCNC: 62 U/L — SIGNIFICANT CHANGE UP (ref 30–115)
ALT FLD-CCNC: 43 U/L — HIGH (ref 0–41)
ANION GAP SERPL CALC-SCNC: 14 MMOL/L — SIGNIFICANT CHANGE UP (ref 7–14)
AST SERPL-CCNC: 28 U/L — SIGNIFICANT CHANGE UP (ref 0–41)
BASOPHILS # BLD AUTO: 0.04 K/UL — SIGNIFICANT CHANGE UP (ref 0–0.2)
BASOPHILS NFR BLD AUTO: 0.6 % — SIGNIFICANT CHANGE UP (ref 0–1)
BILIRUB SERPL-MCNC: 0.4 MG/DL — SIGNIFICANT CHANGE UP (ref 0.2–1.2)
BUN SERPL-MCNC: 15 MG/DL — SIGNIFICANT CHANGE UP (ref 10–20)
CALCIUM SERPL-MCNC: 9 MG/DL — SIGNIFICANT CHANGE UP (ref 8.4–10.5)
CHLORIDE SERPL-SCNC: 104 MMOL/L — SIGNIFICANT CHANGE UP (ref 98–110)
CO2 SERPL-SCNC: 20 MMOL/L — SIGNIFICANT CHANGE UP (ref 17–32)
CREAT SERPL-MCNC: 1.1 MG/DL — SIGNIFICANT CHANGE UP (ref 0.7–1.5)
E COLI DNA BLD POS QL NAA+NON-PROBE: SIGNIFICANT CHANGE UP
EGFR: 75 ML/MIN/1.73M2 — SIGNIFICANT CHANGE UP
EOSINOPHIL # BLD AUTO: 0.02 K/UL — SIGNIFICANT CHANGE UP (ref 0–0.7)
EOSINOPHIL NFR BLD AUTO: 0.3 % — SIGNIFICANT CHANGE UP (ref 0–8)
GLUCOSE SERPL-MCNC: 118 MG/DL — HIGH (ref 70–99)
GRAM STN FLD: SIGNIFICANT CHANGE UP
HCT VFR BLD CALC: 37.7 % — LOW (ref 42–52)
HGB BLD-MCNC: 13.3 G/DL — LOW (ref 14–18)
IMM GRANULOCYTES NFR BLD AUTO: 0.5 % — HIGH (ref 0.1–0.3)
LYMPHOCYTES # BLD AUTO: 0.89 K/UL — LOW (ref 1.2–3.4)
LYMPHOCYTES # BLD AUTO: 14 % — LOW (ref 20.5–51.1)
MAGNESIUM SERPL-MCNC: 2 MG/DL — SIGNIFICANT CHANGE UP (ref 1.8–2.4)
MCHC RBC-ENTMCNC: 32.4 PG — HIGH (ref 27–31)
MCHC RBC-ENTMCNC: 35.3 G/DL — SIGNIFICANT CHANGE UP (ref 32–37)
MCV RBC AUTO: 92 FL — SIGNIFICANT CHANGE UP (ref 80–94)
METHOD TYPE: SIGNIFICANT CHANGE UP
MONOCYTES # BLD AUTO: 0.77 K/UL — HIGH (ref 0.1–0.6)
MONOCYTES NFR BLD AUTO: 12.1 % — HIGH (ref 1.7–9.3)
NEUTROPHILS # BLD AUTO: 4.61 K/UL — SIGNIFICANT CHANGE UP (ref 1.4–6.5)
NEUTROPHILS NFR BLD AUTO: 72.5 % — SIGNIFICANT CHANGE UP (ref 42.2–75.2)
NRBC # BLD: 0 /100 WBCS — SIGNIFICANT CHANGE UP (ref 0–0)
PLATELET # BLD AUTO: 233 K/UL — SIGNIFICANT CHANGE UP (ref 130–400)
PMV BLD: 10.1 FL — SIGNIFICANT CHANGE UP (ref 7.4–10.4)
POTASSIUM SERPL-MCNC: 3.7 MMOL/L — SIGNIFICANT CHANGE UP (ref 3.5–5)
POTASSIUM SERPL-SCNC: 3.7 MMOL/L — SIGNIFICANT CHANGE UP (ref 3.5–5)
PROT SERPL-MCNC: 6.2 G/DL — SIGNIFICANT CHANGE UP (ref 6–8)
RBC # BLD: 4.1 M/UL — LOW (ref 4.7–6.1)
RBC # FLD: 12.9 % — SIGNIFICANT CHANGE UP (ref 11.5–14.5)
SODIUM SERPL-SCNC: 138 MMOL/L — SIGNIFICANT CHANGE UP (ref 135–146)
WBC # BLD: 6.36 K/UL — SIGNIFICANT CHANGE UP (ref 4.8–10.8)
WBC # FLD AUTO: 6.36 K/UL — SIGNIFICANT CHANGE UP (ref 4.8–10.8)

## 2023-07-04 PROCEDURE — 99232 SBSQ HOSP IP/OBS MODERATE 35: CPT

## 2023-07-04 RX ADMIN — MEROPENEM 100 MILLIGRAM(S): 1 INJECTION INTRAVENOUS at 05:23

## 2023-07-04 RX ADMIN — GABAPENTIN 300 MILLIGRAM(S): 400 CAPSULE ORAL at 18:31

## 2023-07-04 RX ADMIN — Medication 650 MILLIGRAM(S): at 05:10

## 2023-07-04 RX ADMIN — Medication 100 MILLIGRAM(S): at 05:11

## 2023-07-04 RX ADMIN — ATORVASTATIN CALCIUM 80 MILLIGRAM(S): 80 TABLET, FILM COATED ORAL at 21:20

## 2023-07-04 RX ADMIN — MEROPENEM 100 MILLIGRAM(S): 1 INJECTION INTRAVENOUS at 13:25

## 2023-07-04 RX ADMIN — ENOXAPARIN SODIUM 40 MILLIGRAM(S): 100 INJECTION SUBCUTANEOUS at 15:51

## 2023-07-04 RX ADMIN — Medication 240 MILLIGRAM(S): at 05:10

## 2023-07-04 RX ADMIN — GABAPENTIN 300 MILLIGRAM(S): 400 CAPSULE ORAL at 05:11

## 2023-07-04 RX ADMIN — PANTOPRAZOLE SODIUM 40 MILLIGRAM(S): 20 TABLET, DELAYED RELEASE ORAL at 05:23

## 2023-07-04 RX ADMIN — Medication 650 MILLIGRAM(S): at 15:51

## 2023-07-04 RX ADMIN — MEROPENEM 100 MILLIGRAM(S): 1 INJECTION INTRAVENOUS at 21:21

## 2023-07-04 NOTE — PROGRESS NOTE ADULT - ASSESSMENT
64 yo man pmhx nephrolithiasis and recurrent UTIs    # pyelonephritis  - seen by ID  - switched to merrem  - 1 set of blood culture positive for gram negative rods  - UCx: E.coli     # HTN  - metoprolol and cardizem  - f/u  EP as outpt    # chronic back pain  # Chronic L Hip pain  -  outpt f/u    # depression  - on  venlafaxine     # Progress hand off:  - follow up cultures sensitivities, ID follow up      62 yo man pmhx nephrolithiasis and recurrent UTIs    # pyelonephritis  - Tmax 100.8 over night   - seen by ID  - switched to merrem  - 1 set of blood culture positive for gram negative rods  - UCx: E.coli     # HTN  - metoprolol and cardizem  - f/u  EP as outpt    # chronic back pain  # Chronic L Hip pain  -  outpt f/u    # depression  - on  venlafaxine     # Progress hand off:  - follow up cultures sensitivities, ID follow up

## 2023-07-04 NOTE — PROGRESS NOTE ADULT - ASSESSMENT
62 yo man pmhx nephrolithiasis and recurrent UTIs    # pyelonephritis  - seen by ID  - continue meropenem; f/u sensitivities    # HTN  - metoprolol and cardizem  - f/u  EP as outpt    # chronic back pain  # Chronic L Hip pain  -  outpt f/u    # depression  - on  venlafaxine

## 2023-07-05 LAB
-  AMIKACIN: SIGNIFICANT CHANGE UP
-  AMPICILLIN/SULBACTAM: SIGNIFICANT CHANGE UP
-  AMPICILLIN: SIGNIFICANT CHANGE UP
-  AZTREONAM: SIGNIFICANT CHANGE UP
-  CEFAZOLIN: SIGNIFICANT CHANGE UP
-  CEFEPIME: SIGNIFICANT CHANGE UP
-  CEFTRIAXONE: SIGNIFICANT CHANGE UP
-  CIPROFLOXACIN: SIGNIFICANT CHANGE UP
-  ERTAPENEM: SIGNIFICANT CHANGE UP
-  GENTAMICIN: SIGNIFICANT CHANGE UP
-  IMIPENEM: SIGNIFICANT CHANGE UP
-  LEVOFLOXACIN: SIGNIFICANT CHANGE UP
-  MEROPENEM: SIGNIFICANT CHANGE UP
-  PIPERACILLIN/TAZOBACTAM: SIGNIFICANT CHANGE UP
-  TOBRAMYCIN: SIGNIFICANT CHANGE UP
-  TRIMETHOPRIM/SULFAMETHOXAZOLE: SIGNIFICANT CHANGE UP
CULTURE RESULTS: SIGNIFICANT CHANGE UP
GRAM STN FLD: SIGNIFICANT CHANGE UP
METHOD TYPE: SIGNIFICANT CHANGE UP
ORGANISM # SPEC MICROSCOPIC CNT: SIGNIFICANT CHANGE UP
SPECIMEN SOURCE: SIGNIFICANT CHANGE UP

## 2023-07-05 PROCEDURE — 99233 SBSQ HOSP IP/OBS HIGH 50: CPT

## 2023-07-05 RX ADMIN — ENOXAPARIN SODIUM 40 MILLIGRAM(S): 100 INJECTION SUBCUTANEOUS at 18:15

## 2023-07-05 RX ADMIN — ATORVASTATIN CALCIUM 80 MILLIGRAM(S): 80 TABLET, FILM COATED ORAL at 21:41

## 2023-07-05 RX ADMIN — MEROPENEM 100 MILLIGRAM(S): 1 INJECTION INTRAVENOUS at 05:31

## 2023-07-05 RX ADMIN — MEROPENEM 100 MILLIGRAM(S): 1 INJECTION INTRAVENOUS at 16:00

## 2023-07-05 RX ADMIN — Medication 240 MILLIGRAM(S): at 05:30

## 2023-07-05 RX ADMIN — GABAPENTIN 300 MILLIGRAM(S): 400 CAPSULE ORAL at 05:30

## 2023-07-05 RX ADMIN — PANTOPRAZOLE SODIUM 40 MILLIGRAM(S): 20 TABLET, DELAYED RELEASE ORAL at 09:07

## 2023-07-05 RX ADMIN — GABAPENTIN 300 MILLIGRAM(S): 400 CAPSULE ORAL at 18:15

## 2023-07-05 RX ADMIN — Medication 100 MILLIGRAM(S): at 05:30

## 2023-07-05 RX ADMIN — Medication 650 MILLIGRAM(S): at 05:29

## 2023-07-05 RX ADMIN — MEROPENEM 100 MILLIGRAM(S): 1 INJECTION INTRAVENOUS at 21:41

## 2023-07-05 NOTE — PROGRESS NOTE ADULT - ASSESSMENT
62 yo man pmhx nephrolithiasis and recurrent UTIs    # pyelonephritis  - seen by ID  - continue meropenem; f/u sensitivities    # HTN  - metoprolol and cardizem  - f/u  EP as outpt    # chronic back pain  # Chronic L Hip pain  -  outpt f/u    # depression  - on  venlafaxine        62 yo man pmhx nephrolithiasis and recurrent UTIs    #pyelonephritis  seen by ID  c/w meropenem; f/u sensitivities    #HTN  c/w metoprolol and cardizem  f/u  EP as outpt    #chronic back pain  #Chronic L Hip pain  outpt f/u    #depression  on  venlafaxine     Progress Note Handoff:  pending: UCx  Dispo: Home

## 2023-07-06 ENCOUNTER — TRANSCRIPTION ENCOUNTER (OUTPATIENT)
Age: 63
End: 2023-07-06

## 2023-07-06 VITALS
SYSTOLIC BLOOD PRESSURE: 132 MMHG | TEMPERATURE: 98 F | RESPIRATION RATE: 18 BRPM | HEART RATE: 88 BPM | DIASTOLIC BLOOD PRESSURE: 76 MMHG

## 2023-07-06 LAB
-  AMIKACIN: SIGNIFICANT CHANGE UP
-  AMOXICILLIN/CLAVULANIC ACID: SIGNIFICANT CHANGE UP
-  AMPICILLIN/SULBACTAM: SIGNIFICANT CHANGE UP
-  AMPICILLIN: SIGNIFICANT CHANGE UP
-  AZTREONAM: SIGNIFICANT CHANGE UP
-  CEFAZOLIN: SIGNIFICANT CHANGE UP
-  CEFEPIME: SIGNIFICANT CHANGE UP
-  CEFTRIAXONE: SIGNIFICANT CHANGE UP
-  CEFUROXIME: SIGNIFICANT CHANGE UP
-  CIPROFLOXACIN: SIGNIFICANT CHANGE UP
-  ERTAPENEM: SIGNIFICANT CHANGE UP
-  GENTAMICIN: SIGNIFICANT CHANGE UP
-  IMIPENEM: SIGNIFICANT CHANGE UP
-  LEVOFLOXACIN: SIGNIFICANT CHANGE UP
-  MEROPENEM: SIGNIFICANT CHANGE UP
-  NITROFURANTOIN: SIGNIFICANT CHANGE UP
-  PIPERACILLIN/TAZOBACTAM: SIGNIFICANT CHANGE UP
-  TOBRAMYCIN: SIGNIFICANT CHANGE UP
-  TRIMETHOPRIM/SULFAMETHOXAZOLE: SIGNIFICANT CHANGE UP
ANION GAP SERPL CALC-SCNC: 13 MMOL/L — SIGNIFICANT CHANGE UP (ref 7–14)
BUN SERPL-MCNC: 13 MG/DL — SIGNIFICANT CHANGE UP (ref 10–20)
CALCIUM SERPL-MCNC: 8.7 MG/DL — SIGNIFICANT CHANGE UP (ref 8.4–10.4)
CHLORIDE SERPL-SCNC: 106 MMOL/L — SIGNIFICANT CHANGE UP (ref 98–110)
CO2 SERPL-SCNC: 22 MMOL/L — SIGNIFICANT CHANGE UP (ref 17–32)
CREAT SERPL-MCNC: 0.8 MG/DL — SIGNIFICANT CHANGE UP (ref 0.7–1.5)
CULTURE RESULTS: SIGNIFICANT CHANGE UP
CULTURE RESULTS: SIGNIFICANT CHANGE UP
EGFR: 99 ML/MIN/1.73M2 — SIGNIFICANT CHANGE UP
GLUCOSE SERPL-MCNC: 111 MG/DL — HIGH (ref 70–99)
HCT VFR BLD CALC: 35.5 % — LOW (ref 42–52)
HGB BLD-MCNC: 12.3 G/DL — LOW (ref 14–18)
MAGNESIUM SERPL-MCNC: 1.8 MG/DL — SIGNIFICANT CHANGE UP (ref 1.8–2.4)
MCHC RBC-ENTMCNC: 31.8 PG — HIGH (ref 27–31)
MCHC RBC-ENTMCNC: 34.6 G/DL — SIGNIFICANT CHANGE UP (ref 32–37)
MCV RBC AUTO: 91.7 FL — SIGNIFICANT CHANGE UP (ref 80–94)
METHOD TYPE: SIGNIFICANT CHANGE UP
NRBC # BLD: 0 /100 WBCS — SIGNIFICANT CHANGE UP (ref 0–0)
ORGANISM # SPEC MICROSCOPIC CNT: SIGNIFICANT CHANGE UP
ORGANISM # SPEC MICROSCOPIC CNT: SIGNIFICANT CHANGE UP
PLATELET # BLD AUTO: 248 K/UL — SIGNIFICANT CHANGE UP (ref 130–400)
PMV BLD: 9.5 FL — SIGNIFICANT CHANGE UP (ref 7.4–10.4)
POTASSIUM SERPL-MCNC: 3.9 MMOL/L — SIGNIFICANT CHANGE UP (ref 3.5–5)
POTASSIUM SERPL-SCNC: 3.9 MMOL/L — SIGNIFICANT CHANGE UP (ref 3.5–5)
RBC # BLD: 3.87 M/UL — LOW (ref 4.7–6.1)
RBC # FLD: 13.3 % — SIGNIFICANT CHANGE UP (ref 11.5–14.5)
SODIUM SERPL-SCNC: 141 MMOL/L — SIGNIFICANT CHANGE UP (ref 135–146)
SPECIMEN SOURCE: SIGNIFICANT CHANGE UP
SPECIMEN SOURCE: SIGNIFICANT CHANGE UP
WBC # BLD: 4.33 K/UL — LOW (ref 4.8–10.8)
WBC # FLD AUTO: 4.33 K/UL — LOW (ref 4.8–10.8)

## 2023-07-06 PROCEDURE — 99239 HOSP IP/OBS DSCHRG MGMT >30: CPT

## 2023-07-06 RX ORDER — ACETAMINOPHEN 500 MG
2 TABLET ORAL
Qty: 0 | Refills: 0 | DISCHARGE
Start: 2023-07-06

## 2023-07-06 RX ORDER — TAMSULOSIN HYDROCHLORIDE 0.4 MG/1
1 CAPSULE ORAL
Qty: 30 | Refills: 0
Start: 2023-07-06 | End: 2023-08-04

## 2023-07-06 RX ORDER — PANTOPRAZOLE SODIUM 20 MG/1
1 TABLET, DELAYED RELEASE ORAL
Qty: 120 | Refills: 0
Start: 2023-07-06 | End: 2023-09-03

## 2023-07-06 RX ADMIN — GABAPENTIN 300 MILLIGRAM(S): 400 CAPSULE ORAL at 18:22

## 2023-07-06 RX ADMIN — Medication 240 MILLIGRAM(S): at 05:32

## 2023-07-06 RX ADMIN — PANTOPRAZOLE SODIUM 40 MILLIGRAM(S): 20 TABLET, DELAYED RELEASE ORAL at 05:33

## 2023-07-06 RX ADMIN — Medication 100 MILLIGRAM(S): at 05:32

## 2023-07-06 RX ADMIN — MEROPENEM 100 MILLIGRAM(S): 1 INJECTION INTRAVENOUS at 05:31

## 2023-07-06 RX ADMIN — GABAPENTIN 300 MILLIGRAM(S): 400 CAPSULE ORAL at 05:32

## 2023-07-06 RX ADMIN — ENOXAPARIN SODIUM 40 MILLIGRAM(S): 100 INJECTION SUBCUTANEOUS at 16:03

## 2023-07-06 NOTE — DISCHARGE NOTE PROVIDER - NSDCMRMEDTOKEN_GEN_ALL_CORE_FT
Bactrim  mg-160 mg oral tablet: 1 tab(s) orally 2 times a day  dilTIAZem 240 mg/24 hours oral capsule, extended release: 1 cap(s) orally once a day  gabapentin 300 mg oral capsule: 1 cap(s) orally 2 times a day  ibuprofen 800 mg oral tablet: 1 tab(s) orally 3 times a day, As Needed  LORazepam 2 mg oral tablet: 1 tab(s) orally 3 times a day, As Needed  metoprolol succinate 100 mg oral capsule, extended release: 1 orally once a day  Protonix 40 mg oral delayed release tablet: 1 tab(s) orally 2 times a day   rosuvastatin 20 mg oral tablet: 1 tab(s) orally once a day  venlafaxine 37.5 mg oral tablet: 1 tab(s) orally 2 times a day  zolpidem 10 mg oral tablet: 1 tab(s) orally once a day (at bedtime)   acetaminophen 325 mg oral tablet: 2 tab(s) orally every 6 hours As needed Temp greater or equal to 38C (100.4F)  Bactrim  mg-160 mg oral tablet: 1 tab(s) orally 2 times a day  dilTIAZem 240 mg/24 hours oral capsule, extended release: 1 cap(s) orally once a day  gabapentin 300 mg oral capsule: 1 cap(s) orally 2 times a day  LORazepam 2 mg oral tablet: 1 tab(s) orally 3 times a day, As Needed  metoprolol succinate 100 mg oral capsule, extended release: 1 orally once a day  Protonix 40 mg oral delayed release tablet: 1 tab(s) orally 2 times a day   rosuvastatin 20 mg oral tablet: 1 tab(s) orally once a day  venlafaxine 37.5 mg oral tablet: 1 tab(s) orally 2 times a day  zolpidem 10 mg oral tablet: 1 tab(s) orally once a day (at bedtime)

## 2023-07-06 NOTE — PROGRESS NOTE ADULT - ASSESSMENT
64 yo man pmhx nephrolithiasis and recurrent UTIs    #pyelonephritis  #GNR bacteremia  # Sepsis due to pyelonephritis on admission  d/w ID, can be switched to Bactrim DS 1 tab BID x10 more days  added Flomax    #HTN  c/w metoprolol and cardizem  f/u  EP as outpt    #chronic back pain  #Chronic L Hip pain  outpt f/u    #depression  on  venlafaxine     Discharge instructions discussed and patient knows when to seek immediate medical attention.  Patient has proper follow up.  All results discussed and patient aware they require further follow up/work up.  Stressed importance of proper follow up.  Medications prescribed and changes discussed.  All questions and concerns from patient and family addressed. Understanding of instructions verbalized.    Time spent in completing discharge process and coordinating care 45 minutes.    Discussed with housestaff, nursing, case mgmt

## 2023-07-06 NOTE — DISCHARGE NOTE PROVIDER - CARE PROVIDER_API CALL
Francie Oden  Family Medicine  [address]  [place] [address]  Phone: [phone]  Fax: [phone]  Follow Up Time: 1 week   Francie Oden  Family Medicine  [address]  [place] [address]  Phone: [phone]  Fax: [phone]  Follow Up Time: 1 week    Albin Polk  Urology  [address] [place] [address], [address] [place] [id number]  Phone: [phone]  Fax: [phone]  Follow Up Time: 1 week

## 2023-07-06 NOTE — DISCHARGE NOTE PROVIDER - PROVIDER TOKENS
PROVIDER:[TOKEN:[[id number]],FOLLOWUP:[1 week]] PROVIDER:[TOKEN:[[id number]],FOLLOWUP:[1 week]],PROVIDER:[TOKEN:[[id number]],FOLLOWUP:[1 week]]

## 2023-07-06 NOTE — PROGRESS NOTE ADULT - ASSESSMENT
ASSESSMENT  63y M admitted with Tubulointerstitial nephritis. Pt with Sepsis on admission (Tmax 102.2F, Pulse 128, WBC 16.88,) with pyelonephritis. Patient with improved symptoms this morning with resolution of flank pain and improved malaise s/p cefriaxone x1 and zosyn. Urine culture from 6/27/23 showing ESBL sensitive to meropenem.     #Ecoli bacteremia secondary to Pyelonephritis with Sepsis on admission T>101 P>90 WBC 16  BCX ESBL ecoli, UCX same organism   6/27 UCX esbl ecoli S bactrim   < from: CT Abdomen and Pelvis w/ IV Cont (07.02.23 @ 08:52) >  Left renal multifocal cortical areas of hypoenhancement and perinephric   stranding suspicious for acute pyelonephritis. No evidence of radiopaque   obstructing stone.  #Atelectasis    RECOMMEND  - PO bactrim 1 DS BID to complete total of 14 days antibiotics (including inpatient meropenem)  - f/u Urology  - counseled about low K diet, Counseled patient about potential antibiotic side effects     If any questions, please call or send a message on [employer] Teams  Please continue to update ID with any pertinent new laboratory or radiographic findings  Spectra [phone]

## 2023-07-06 NOTE — DISCHARGE NOTE PROVIDER - NSDCFUSCHEDAPPT_GEN_ALL_CORE_FT
Jeffrey Rivera  [facility] PreAdmits  Scheduled Appointment: 07/11/2023    Vincent Reis  [facility]  ONCPAINMGT [address]  Scheduled Appointment: 07/13/2023    Unknown, Doctor  [facility] PreAdmits  Scheduled Appointment: 07/21/2023    [facility]  MRI SI [address]  Scheduled Appointment: 07/21/2023    Albin Polk  [facility]  UROLOGY [address]  Scheduled Appointment: 07/25/2023    Jeffrey Rivera  [facility] PreAdmits  Scheduled Appointment: 07/28/2023    Jeffrey Rivera  [facility]  ONCORTHO [address]  Scheduled Appointment: 07/28/2023    [facility]  ONCORTHO [address]  Scheduled Appointment: 08/18/2023

## 2023-07-06 NOTE — PROGRESS NOTE ADULT - SUBJECTIVE AND OBJECTIVE BOX
Patient is a 63y old  Male who presents with a chief complaint of   INTERVAL HPI/OVERNIGHT EVENTS: Patient was examined and seen at bedside. This morning pt is resting comfortably in bed and reports no new issues or overnight events. No complaints, feels much better. Reports chronic difficulty emptying his bladder completely.  ROS: Denies CP, SOB, AP, new weakness  All other systems reviewed and are within normal limits.  InitialHPI:  64 yo man pmhx nephrolithiasis and recurrent UTIs, ?tachyarrhythmia, MDD, presents w/ BL flank pain    Per pt he was in his usual state of health prior to yesterday morning when he began to have slight pain after voiding; sxs progressed to BL flank pain and subjective fevers and weakness. Pt denies hematuria, n/v/d, cp, sob;     ED  /74  RR 18 96% RA T 98.9 (TMax 101.2)  WBC 16.58 AST 57 ALT 60 Trop negative Lac 1.5 Lipase 23  CT abd suggestive of pyelo, no evidence of stones    Pt was given ceftriaxone, 1L bolus, ultram 25 and morphine 2 and admitted to medicine (02 Jul 2023 14:47)    PAST MEDICAL & SURGICAL HISTORY:  OA (osteoarthritis)      HTN (hypertension)      Hypercholesterolemia      Depression  PT DENIES SUICIDAL IDEATION      Backache      H/O hernia repair      History of open reduction and internal fixation (ORIF) procedure  RIGHT LEG      H/O hemorrhoids          General: NAD, AAO3  HEENT:  EOMI, no LAD  CV: S1 S2  Resp: decreased breath sounds at bases  GI: NT/ND/S +BS  MS: no clubbing/cyanosis/edema, + pulses b/l  Neuro: nonfocal, +reflexes thruout    MEDICATIONS  (STANDING):  atorvastatin 80 milliGRAM(s) Oral at bedtime  diltiazem    milliGRAM(s) Oral daily  enoxaparin Injectable 40 milliGRAM(s) SubCutaneous every 24 hours  gabapentin 300 milliGRAM(s) Oral two times a day  meropenem  IVPB 1000 milliGRAM(s) IV Intermittent every 8 hours  meropenem  IVPB      metoprolol succinate  milliGRAM(s) Oral daily  pantoprazole    Tablet 40 milliGRAM(s) Oral before breakfast    MEDICATIONS  (PRN):  acetaminophen     Tablet .. 650 milliGRAM(s) Oral every 6 hours PRN Temp greater or equal to 38C (100.4F)  aluminum hydroxide/magnesium hydroxide/simethicone Suspension 30 milliLiter(s) Oral every 4 hours PRN Dyspepsia  LORazepam     Tablet 2 milliGRAM(s) Oral three times a day PRN Anxiety  melatonin 3 milliGRAM(s) Oral at bedtime PRN Insomnia  ondansetron Injectable 4 milliGRAM(s) IV Push every 8 hours PRN Nausea and/or Vomiting  traMADol 25 milliGRAM(s) Oral four times a day PRN Severe Pain (7 - 10)  zolpidem 5 milliGRAM(s) Oral at bedtime PRN Insomnia  zolpidem 5 milliGRAM(s) Oral at bedtime PRN Insomnia    Vital Signs Last 24 Hrs  T(C): 36.5 (06 Jul 2023 12:09), Max: 36.5 (06 Jul 2023 12:09)  T(F): 97.7 (06 Jul 2023 12:09), Max: 97.7 (06 Jul 2023 12:09)  HR: 88 (06 Jul 2023 12:09) (88 - 96)  BP: 132/76 (06 Jul 2023 12:09) (131/86 - 132/76)  BP(mean): --  RR: 18 (06 Jul 2023 12:09) (18 - 18)  SpO2: --      CAPILLARY BLOOD GLUCOSE                              12.3   4.33  )-----------( 248      ( 06 Jul 2023 06:44 )             35.5     07-06    141  |  106  |  13  ----------------------------<  111<H>  3.9   |  22  |  0.8    Ca    8.7      06 Jul 2023 06:44  Mg     1.8     07-06              Urinalysis Basic - ( 06 Jul 2023 06:44 )    Color: x / Appearance: x / SG: x / pH: x  Gluc: 111 mg/dL / Ketone: x  / Bili: x / Urobili: x   Blood: x / Protein: x / Nitrite: x   Leuk Esterase: x / RBC: x / WBC x   Sq Epi: x / Non Sq Epi: x / Bacteria: x              Chart, Consultant(s) Notes Reviewed:  [x ] YES  [ ] NO  Care Discussed with Consultants/Other Providers/ Housestaff [ x] YES  [ ] NO  Radiology, labs, old available records personally reviewed.                            INTERPRETATION:  CLINICAL STATEMENT: Abdominal pain. abd pain    TECHNIQUE: Contiguous axial CT images were obtained of the abdomen and   pelvis following administration of intravenous contrast.  Oral contrast   was not administered. Reformatted images in the coronal and sagittal   planes were acquired.    COMPARISON CT: 5/22/2022    FINDINGS:    LOWER CHEST: Bibasilar subsegmental atelectasis.    HEPATOBILIARY: Diffuse hypoattenuation of the liver which may be seen   with hepatic steatosis.. Cholelithiasis. Subcentimeter density too small   to characterize.    SPLEEN: Within normal limits.    ADRENALS: Right adrenal glandnodularity without significant change    PANCREAS: Within normal limits.    KIDNEYS: Left renal cyst with peripheral calcification. No right   hydronephrosis. Right renal subcentimeter hypodensity too small to   characterize. Left renal subcentimeterhypodensities too small to   characterize. Left renal perinephric stranding and proximal ureteral wall   enhancement. Multifocal cortical areas of hypoenhancement within the left   kidney. Left kidney extrarenal pelvis versus mild fullness.    ABDOMINOPELVIC NODES: No enlarged abdominal or pelvic lymph nodes.    PELVIC ORGANS: Enlarged prostate.    PERITONEUM/MESENTERY/BOWEL: No bowel obstruction, ascites or free   intraperitoneal air. Colonic diverticulosis. Small hiatal hernia. Ventral   herniarepair. Normal caliber appendix.    BONES/SOFT TISSUES: Degenerative changes of the spine.  Right femoral   fixation hardware. Right fat-containing inguinal hernia.      IMPRESSION:  Left renal multifocal cortical areas of hypoenhancement and perinephric   stranding suspicious for acute pyelonephritis. No evidence of radiopaque   obstructing stone.    < end of copied text >      <<<<<PHYSICAL EXAM>>>>>  General: No apparent distress  Cardiovascular: S1, S2  Gastrointestinal: Soft, Non-tender, Non-distended  Respiratory: Good air entry bilaterally  Musculoskeletal: Moves all extremities. NO CVA tenderness. Paraspinal muscle tenderness noted  Lymphatic: No edema  Neurologic: No gross motor deficit    -----------------------------------------------------------------------------------------------------------------------------------------------------------------------------------------------
SUBJECTIVE:    Patient is a 63y old Male who presents with a chief complaint of   Currently admitted to medicine with the primary diagnosis of Pyelonephritis       Today is hospital day 2d. This morning he is resting comfortably in bed and reports no new issues or overnight events.     PAST MEDICAL & SURGICAL HISTORY  OA (osteoarthritis)    HTN (hypertension)    Hypercholesterolemia    Depression  PT DENIES SUICIDAL IDEATION    Backache    H/O hernia repair    History of open reduction and internal fixation (ORIF) procedure  RIGHT LEG    H/O hemorrhoids      SOCIAL HISTORY:  Negative for smoking/alcohol/drug use.     ALLERGIES:  No Known Drug Allergies  Plums (Other)  Cherries (Other)  Peaches (Other)    MEDICATIONS:  STANDING MEDICATIONS  atorvastatin 80 milliGRAM(s) Oral at bedtime  diltiazem    milliGRAM(s) Oral daily  enoxaparin Injectable 40 milliGRAM(s) SubCutaneous every 24 hours  gabapentin 300 milliGRAM(s) Oral two times a day  meropenem  IVPB      meropenem  IVPB 1000 milliGRAM(s) IV Intermittent every 8 hours  metoprolol succinate  milliGRAM(s) Oral daily  pantoprazole    Tablet 40 milliGRAM(s) Oral before breakfast    PRN MEDICATIONS  acetaminophen     Tablet .. 650 milliGRAM(s) Oral every 6 hours PRN  aluminum hydroxide/magnesium hydroxide/simethicone Suspension 30 milliLiter(s) Oral every 4 hours PRN  LORazepam     Tablet 2 milliGRAM(s) Oral three times a day PRN  melatonin 3 milliGRAM(s) Oral at bedtime PRN  ondansetron Injectable 4 milliGRAM(s) IV Push every 8 hours PRN  traMADol 25 milliGRAM(s) Oral four times a day PRN  zolpidem 5 milliGRAM(s) Oral at bedtime PRN  zolpidem 5 milliGRAM(s) Oral at bedtime PRN    VITALS:   T(F): 100.8  HR: 104  BP: 125/66  RR: 18  SpO2: --    LABS:                        13.3   6.36  )-----------( 233      ( 04 Jul 2023 06:58 )             37.7     07-04    138  |  104  |  15  ----------------------------<  118<H>  3.7   |  20  |  1.1    Ca    9.0      04 Jul 2023 06:58  Mg     2.0     07-04    TPro  6.2  /  Alb  3.6  /  TBili  0.4  /  DBili  x   /  AST  28  /  ALT  43<H>  /  AlkPhos  62  07-04    PT/INR - ( 03 Jul 2023 07:45 )   PT: 14.00 sec;   INR: 1.22 ratio         PTT - ( 03 Jul 2023 07:45 )  PTT:35.9 sec  Urinalysis Basic - ( 04 Jul 2023 06:58 )    Color: x / Appearance: x / SG: x / pH: x  Gluc: 118 mg/dL / Ketone: x  / Bili: x / Urobili: x   Blood: x / Protein: x / Nitrite: x   Leuk Esterase: x / RBC: x / WBC x   Sq Epi: x / Non Sq Epi: x / Bacteria: x            Culture - Blood (collected 02 Jul 2023 11:51)  Source: .Blood Blood-Peripheral  Gram Stain (04 Jul 2023 07:40):    Growth in aerobic bottle: Gram Negative Rods  Preliminary Report (04 Jul 2023 07:40):    Growth in aerobic bottle: Gram Negative Rods    ***Blood Panel PCR results on this specimen are available    approximately 3 hours after the Gram stain result.***    Gram stain, PCR, and/or culture results may not always    correspond due to difference in methodologies.    ************************************************************    This PCR assay was performed by multiplex PCR. This    Assay tests for 66 bacterial and resistance gene targets.    Please refer to the [facility] test directory    at [email or web address] for details.  Organism: Blood Culture PCR (04 Jul 2023 10:02)  Organism: Blood Culture PCR (04 Jul 2023 10:02)    Culture - Blood (collected 02 Jul 2023 11:51)  Source: .Blood Blood-Peripheral  Preliminary Report (03 Jul 2023 21:01):    No growth at 24 hours    Culture - Urine (collected 02 Jul 2023 10:13)  Source: Clean Catch Clean Catch (Midstream)  Preliminary Report (04 Jul 2023 08:15):    >100,000 CFU/ml Escherichia coli         PHYSICAL EXAM:  GEN: No acute distress  LUNGS: Clear to auscultation bilaterally   HEART: S1/S2 present. RRR.   ABD: Soft, non-tender, non-distended. Bowel sounds present  EXT: NC/NC/NE/2+PP/ESQUIVEL  NEURO: AAOX3    
----------Daily Progress Note----------    HISTORY OF PRESENT ILLNESS:  Patient is a 63y old Male who presents with a chief complaint of   Currently admitted to medicine with the primary diagnosis of Pyelonephritis       Today is hospital day 2d.     INTERVAL HOSPITAL COURSE / OVERNIGHT EVENTS:    No overnight events    Review of Systems: Otherwise unremarkable     <<<<<PAST MEDICAL & SURGICAL HISTORY>>>>>  OA (osteoarthritis)    HTN (hypertension)    Hypercholesterolemia    Depression  PT DENIES SUICIDAL IDEATION    Backache    H/O hernia repair    History of open reduction and internal fixation (ORIF) procedure  RIGHT LEG    H/O hemorrhoids      ALLERGIES  No Known Drug Allergies  Plums (Other)  Cherries (Other)  Peaches (Other)      Home Medications:  dilTIAZem 240 mg/24 hours oral capsule, extended release: 1 cap(s) orally once a day (02 Jul 2023 14:44)  gabapentin 300 mg oral capsule: 1 cap(s) orally 2 times a day (02 Jul 2023 14:44)  ibuprofen 800 mg oral tablet: 1 tab(s) orally 3 times a day, As Needed (02 Jul 2023 14:44)  LORazepam 2 mg oral tablet: 1 tab(s) orally 3 times a day, As Needed (02 Jul 2023 14:44)  metoprolol succinate 100 mg oral capsule, extended release: 1 orally once a day (02 Jul 2023 15:14)  rosuvastatin 20 mg oral tablet: 1 tab(s) orally once a day (02 Jul 2023 14:44)  venlafaxine 37.5 mg oral tablet: 1 tab(s) orally 2 times a day (02 Jul 2023 14:44)  zolpidem 10 mg oral tablet: 1 tab(s) orally once a day (at bedtime) (02 Jul 2023 14:44)        MEDICATIONS  STANDING MEDICATIONS  atorvastatin 80 milliGRAM(s) Oral at bedtime  diltiazem    milliGRAM(s) Oral daily  enoxaparin Injectable 40 milliGRAM(s) SubCutaneous every 24 hours  gabapentin 300 milliGRAM(s) Oral two times a day  meropenem  IVPB      meropenem  IVPB 1000 milliGRAM(s) IV Intermittent every 8 hours  metoprolol succinate  milliGRAM(s) Oral daily  pantoprazole    Tablet 40 milliGRAM(s) Oral before breakfast    PRN MEDICATIONS  acetaminophen     Tablet .. 650 milliGRAM(s) Oral every 6 hours PRN  aluminum hydroxide/magnesium hydroxide/simethicone Suspension 30 milliLiter(s) Oral every 4 hours PRN  LORazepam     Tablet 2 milliGRAM(s) Oral three times a day PRN  melatonin 3 milliGRAM(s) Oral at bedtime PRN  ondansetron Injectable 4 milliGRAM(s) IV Push every 8 hours PRN  traMADol 25 milliGRAM(s) Oral four times a day PRN  zolpidem 5 milliGRAM(s) Oral at bedtime PRN  zolpidem 5 milliGRAM(s) Oral at bedtime PRN    VITALS:  T(F): 100.8  HR: 104  BP: 125/66  RR: 18  SpO2: --    <<<<<LABS>>>>>                        13.3   6.36  )-----------( 233      ( 04 Jul 2023 06:58 )             37.7     07-04    138  |  104  |  15  ----------------------------<  118<H>  3.7   |  20  |  1.1    Ca    9.0      04 Jul 2023 06:58  Mg     2.0     07-04    TPro  6.2  /  Alb  3.6  /  TBili  0.4  /  DBili  x   /  AST  28  /  ALT  43<H>  /  AlkPhos  62  07-04    PT/INR - ( 03 Jul 2023 07:45 )   PT: 14.00 sec;   INR: 1.22 ratio         PTT - ( 03 Jul 2023 07:45 )  PTT:35.9 sec  Urinalysis Basic - ( 04 Jul 2023 06:58 )    Color: x / Appearance: x / SG: x / pH: x  Gluc: 118 mg/dL / Ketone: x  / Bili: x / Urobili: x   Blood: x / Protein: x / Nitrite: x   Leuk Esterase: x / RBC: x / WBC x   Sq Epi: x / Non Sq Epi: x / Bacteria: x            Culture - Blood (collected 02 Jul 2023 11:51)  Source: .Blood Blood-Peripheral  Gram Stain (04 Jul 2023 07:40):    Growth in aerobic bottle: Gram Negative Rods  Preliminary Report (04 Jul 2023 07:40):    Growth in aerobic bottle: Gram Negative Rods    ***Blood Panel PCR results on this specimen are available    approximately 3 hours after the Gram stain result.***    Gram stain, PCR, and/or culture results may not always    correspond due to difference in methodologies.    ************************************************************    This PCR assay was performed by multiplex PCR. This    Assay tests for 66 bacterial and resistance gene targets.    Please refer to the [facility] test directory    at [email or web address] for details.    Culture - Blood (collected 02 Jul 2023 11:51)  Source: .Blood Blood-Peripheral  Preliminary Report (03 Jul 2023 21:01):    No growth at 24 hours    Culture - Urine (collected 02 Jul 2023 10:13)  Source: Clean Catch Clean Catch (Midstream)  Preliminary Report (04 Jul 2023 08:15):    >100,000 CFU/ml Escherichia coli    [id number]        <<<<<RADIOLOGY>>>>>    < from: CT Abdomen and Pelvis w/ IV Cont (07.02.23 @ 08:52) >    PROCEDURE DATE:  07/02/2023          INTERPRETATION:  CLINICAL STATEMENT: Abdominal pain. abd pain    TECHNIQUE: Contiguous axial CT images were obtained of the abdomen and   pelvis following administration of intravenous contrast.  Oral contrast   was not administered. Reformatted images in the coronal and sagittal   planes were acquired.    COMPARISON CT: 5/22/2022    FINDINGS:    LOWER CHEST: Bibasilar subsegmental atelectasis.    HEPATOBILIARY: Diffuse hypoattenuation of the liver which may be seen   with hepatic steatosis.. Cholelithiasis. Subcentimeter density too small   to characterize.    SPLEEN: Within normal limits.    ADRENALS: Right adrenal glandnodularity without significant change    PANCREAS: Within normal limits.    KIDNEYS: Left renal cyst with peripheral calcification. No right   hydronephrosis. Right renal subcentimeter hypodensity too small to   characterize. Left renal subcentimeterhypodensities too small to   characterize. Left renal perinephric stranding and proximal ureteral wall   enhancement. Multifocal cortical areas of hypoenhancement within the left   kidney. Left kidney extrarenal pelvis versus mild fullness.    ABDOMINOPELVIC NODES: No enlarged abdominal or pelvic lymph nodes.    PELVIC ORGANS: Enlarged prostate.    PERITONEUM/MESENTERY/BOWEL: No bowel obstruction, ascites or free   intraperitoneal air. Colonic diverticulosis. Small hiatal hernia. Ventral   herniarepair. Normal caliber appendix.    BONES/SOFT TISSUES: Degenerative changes of the spine.  Right femoral   fixation hardware. Right fat-containing inguinal hernia.      IMPRESSION:  Left renal multifocal cortical areas of hypoenhancement and perinephric   stranding suspicious for acute pyelonephritis. No evidence of radiopaque   obstructing stone.    < end of copied text >      <<<<<PHYSICAL EXAM>>>>>  General: No apparent distress  Cardiovascular: S1, S2  Gastrointestinal: Soft, Non-tender, Non-distended  Respiratory: Good air entry bilaterally  Musculoskeletal: Moves all extremities. NO CVA tenderness. Paraspinal muscle tenderness noted  Lymphatic: No edema  Neurologic: No gross motor deficit      -----------------------------------------------------------------------------------------------------------------------------------------------------------------------------------------------
----------Daily Progress Note----------    HISTORY OF PRESENT ILLNESS:  Patient is a 63y old Male who presents with a chief complaint of   Currently admitted to medicine with the primary diagnosis of Pyelonephritis       Today is hospital day 3d.     INTERVAL HOSPITAL COURSE / OVERNIGHT EVENTS:    No overnight events    Review of Systems: Otherwise unremarkable     <<<<<PAST MEDICAL & SURGICAL HISTORY>>>>>  OA (osteoarthritis)    HTN (hypertension)    Hypercholesterolemia    Depression  PT DENIES SUICIDAL IDEATION    Backache    H/O hernia repair    History of open reduction and internal fixation (ORIF) procedure  RIGHT LEG    H/O hemorrhoids      ALLERGIES  No Known Drug Allergies  Plums (Other)  Cherries (Other)  Peaches (Other)      Home Medications:  dilTIAZem 240 mg/24 hours oral capsule, extended release: 1 cap(s) orally once a day (02 Jul 2023 14:44)  gabapentin 300 mg oral capsule: 1 cap(s) orally 2 times a day (02 Jul 2023 14:44)  ibuprofen 800 mg oral tablet: 1 tab(s) orally 3 times a day, As Needed (02 Jul 2023 14:44)  LORazepam 2 mg oral tablet: 1 tab(s) orally 3 times a day, As Needed (02 Jul 2023 14:44)  metoprolol succinate 100 mg oral capsule, extended release: 1 orally once a day (02 Jul 2023 15:14)  rosuvastatin 20 mg oral tablet: 1 tab(s) orally once a day (02 Jul 2023 14:44)  venlafaxine 37.5 mg oral tablet: 1 tab(s) orally 2 times a day (02 Jul 2023 14:44)  zolpidem 10 mg oral tablet: 1 tab(s) orally once a day (at bedtime) (02 Jul 2023 14:44)        MEDICATIONS  STANDING MEDICATIONS  atorvastatin 80 milliGRAM(s) Oral at bedtime  diltiazem    milliGRAM(s) Oral daily  enoxaparin Injectable 40 milliGRAM(s) SubCutaneous every 24 hours  gabapentin 300 milliGRAM(s) Oral two times a day  meropenem  IVPB      meropenem  IVPB 1000 milliGRAM(s) IV Intermittent every 8 hours  metoprolol succinate  milliGRAM(s) Oral daily  pantoprazole    Tablet 40 milliGRAM(s) Oral before breakfast    PRN MEDICATIONS  acetaminophen     Tablet .. 650 milliGRAM(s) Oral every 6 hours PRN  aluminum hydroxide/magnesium hydroxide/simethicone Suspension 30 milliLiter(s) Oral every 4 hours PRN  LORazepam     Tablet 2 milliGRAM(s) Oral three times a day PRN  melatonin 3 milliGRAM(s) Oral at bedtime PRN  ondansetron Injectable 4 milliGRAM(s) IV Push every 8 hours PRN  traMADol 25 milliGRAM(s) Oral four times a day PRN  zolpidem 5 milliGRAM(s) Oral at bedtime PRN  zolpidem 5 milliGRAM(s) Oral at bedtime PRN    VITALS:  T(F): 99.3  HR: 107  BP: 114/65  RR: 18  SpO2: --    <<<<<LABS>>>>>                        13.3   6.36  )-----------( 233      ( 04 Jul 2023 06:58 )             37.7     07-04    138  |  104  |  15  ----------------------------<  118<H>  3.7   |  20  |  1.1    Ca    9.0      04 Jul 2023 06:58  Mg     2.0     07-04    TPro  6.2  /  Alb  3.6  /  TBili  0.4  /  DBili  x   /  AST  28  /  ALT  43<H>  /  AlkPhos  62  07-04      Urinalysis Basic - ( 04 Jul 2023 06:58 )    Color: x / Appearance: x / SG: x / pH: x  Gluc: 118 mg/dL / Ketone: x  / Bili: x / Urobili: x   Blood: x / Protein: x / Nitrite: x   Leuk Esterase: x / RBC: x / WBC x   Sq Epi: x / Non Sq Epi: x / Bacteria: x            Culture - Blood (collected 02 Jul 2023 11:51)  Source: .Blood Blood-Peripheral  Gram Stain (04 Jul 2023 07:40):    Growth in aerobic bottle: Gram Negative Rods  Preliminary Report (04 Jul 2023 21:36):    Growth in aerobic bottle: Escherichia coli    ***Blood Panel PCR results on this specimen are available    approximately 3 hours after the Gram stain result.***    Gram stain, PCR, and/or culture results may not always    correspond due to difference in methodologies.    ************************************************************    This PCR assay was performed by multiplex PCR. This    Assay tests for 66 bacterial and resistance gene targets.    Please refer to the [facility] test directory    at [email or web address] for details.  Organism: Blood Culture PCR (04 Jul 2023 10:02)  Organism: Blood Culture PCR (04 Jul 2023 10:02)    Culture - Blood (collected 02 Jul 2023 11:51)  Source: .Blood Blood-Peripheral  Gram Stain (05 Jul 2023 05:13):    Growth in anaerobic bottle: Gram Negative Rods  Preliminary Report (05 Jul 2023 05:13):    Growth in anaerobic bottle: Gram Negative Rods    [id number]        <<<<<RADIOLOGY>>>>>    < from: CT Abdomen and Pelvis w/ IV Cont (07.02.23 @ 08:52) >    PROCEDURE DATE:  07/02/2023          INTERPRETATION:  CLINICAL STATEMENT: Abdominal pain. abd pain    TECHNIQUE: Contiguous axial CT images were obtained of the abdomen and   pelvis following administration of intravenous contrast.  Oral contrast   was not administered. Reformatted images in the coronal and sagittal   planes were acquired.    COMPARISON CT: 5/22/2022    FINDINGS:    LOWER CHEST: Bibasilar subsegmental atelectasis.    HEPATOBILIARY: Diffuse hypoattenuation of the liver which may be seen   with hepatic steatosis.. Cholelithiasis. Subcentimeter density too small   to characterize.    SPLEEN: Within normal limits.    ADRENALS: Right adrenal glandnodularity without significant change    PANCREAS: Within normal limits.    KIDNEYS: Left renal cyst with peripheral calcification. No right   hydronephrosis. Right renal subcentimeter hypodensity too small to   characterize. Left renal subcentimeterhypodensities too small to   characterize. Left renal perinephric stranding and proximal ureteral wall   enhancement. Multifocal cortical areas of hypoenhancement within the left   kidney. Left kidney extrarenal pelvis versus mild fullness.    ABDOMINOPELVIC NODES: No enlarged abdominal or pelvic lymph nodes.    PELVIC ORGANS: Enlarged prostate.    PERITONEUM/MESENTERY/BOWEL: No bowel obstruction, ascites or free   intraperitoneal air. Colonic diverticulosis. Small hiatal hernia. Ventral   herniarepair. Normal caliber appendix.    BONES/SOFT TISSUES: Degenerative changes of the spine.  Right femoral   fixation hardware. Right fat-containing inguinal hernia.      IMPRESSION:  Left renal multifocal cortical areas of hypoenhancement and perinephric   stranding suspicious for acute pyelonephritis. No evidence of radiopaque   obstructing stone.    < end of copied text >      <<<<<PHYSICAL EXAM>>>>>  General: No apparent distress  Cardiovascular: S1, S2  Gastrointestinal: Soft, Non-tender, Non-distended  Respiratory: Good air entry bilaterally  Musculoskeletal: Moves all extremities. NO CVA tenderness. Paraspinal muscle tenderness noted  Lymphatic: No edema  Neurologic: No gross motor deficit    -----------------------------------------------------------------------------------------------------------------------------------------------------------------------------------------------
Pt seen and examined, c/o back pain, worse than ever before    T(F): , Max: 102 (07-03-23 @ 08:01)  HR: 97 (07-03-23 @ 20:32) (82 - 104)  BP: 115/68 (07-03-23 @ 20:32)  RR: 18 (07-03-23 @ 20:32)  SpO2: --  General: No apparent distress  Cardiovascular: S1, S2  Gastrointestinal: Soft, Non-tender, Non-distended  Respiratory: Good air entry bilaterally  Musculoskeletal: Moves all extremities. NO CVA tenderness. Paraspinal muscle tenderness noted  Lymphatic: No edema  Neurologic: No gross motor deficit  Dermatologic: Skin dry  PT/INR - ( 03 Jul 2023 07:45 )   PT: 14.00 sec;   INR: 1.22 ratio         PTT - ( 03 Jul 2023 07:45 )  PTT:35.9 sec                        12.0   8.93  )-----------( 186      ( 03 Jul 2023 07:45 )             36.2     07-03    135  |  102  |  12  ----------------------------<  97  4.2   |  21  |  1.2    Ca    8.9      03 Jul 2023 07:45  Mg     1.6     07-03    TPro  5.6<L>  /  Alb  3.4<L>  /  TBili  0.7  /  DBili  x   /  AST  35  /  ALT  48<H>  /  AlkPhos  56  07-03      Culture - Blood (collected 02 Jul 2023 11:51)  Source: .Blood Blood-Peripheral  Preliminary Report (03 Jul 2023 21:01):    No growth at 24 hours    Culture - Blood (collected 02 Jul 2023 11:51)  Source: .Blood Blood-Peripheral  Preliminary Report (03 Jul 2023 21:01):    No growth at 24 hours    
ANGLE KING  63y, Male  Allergy: No Known Drug Allergies  Plums (Other)  Cherries (Other)  Peaches (Other)      LOS  4d    CHIEF COMPLAINT:     INTERVAL EVENTS/HPI  - No acute events overnight  - T(F): , Max: 99.3 (07-05-23 @ 20:15)  - Denies any worsening symptoms  - Tolerating medication  - WBC Count: 4.33 (07-06-23 @ 06:44)  WBC Count: 6.36 (07-04-23 @ 06:58)     - Creatinine: 0.8 (07-06-23 @ 06:44)       ROS  General: Denies rigors, nightsweats  HEENT: Denies headache, rhinorrhea, sore throat, eye pain  CV: Denies CP, palpitations  PULM: Denies wheezing, hemoptysis  GI: Denies hematemesis, hematochezia, melena  : Denies discharge, hematuria  MSK: Denies arthralgias, myalgias  SKIN: Denies rash, lesions  NEURO: Denies paresthesias, weakness  PSYCH: Denies depression, anxiety    VITALS:  T(F): 97.7, Max: 99.3 (07-05-23 @ 20:15)  HR: 88  BP: 132/76  RR: 18Vital Signs Last 24 Hrs  T(C): 36.5 (06 Jul 2023 12:09), Max: 37.4 (05 Jul 2023 20:15)  T(F): 97.7 (06 Jul 2023 12:09), Max: 99.3 (05 Jul 2023 20:15)  HR: 88 (06 Jul 2023 12:09) (88 - 96)  BP: 132/76 (06 Jul 2023 12:09) (129/78 - 132/76)  BP(mean): 98 (05 Jul 2023 20:15) (98 - 98)  RR: 18 (06 Jul 2023 12:09) (18 - 18)  SpO2: --        PHYSICAL EXAM:  Gen: NAD, resting in bed  HEENT: Normocephalic, atraumatic  Neck: supple, no lymphadenopathy  CV: Regular rate & regular rhythm  Lungs: decreased BS at bases, no fremitus  Abdomen: Soft, BS present  Ext: Warm, well perfused  Neuro: non focal, awake  Skin: no rash, no erythema  Lines: no phlebitis    FH: Non-contributory  Social Hx: Non-contributory    TESTS & MEASUREMENTS:                        12.3   4.33  )-----------( 248      ( 06 Jul 2023 06:44 )             35.5     07-06    141  |  106  |  13  ----------------------------<  111<H>  3.9   |  22  |  0.8    Ca    8.7      06 Jul 2023 06:44  Mg     1.8     07-06          Urinalysis Basic - ( 06 Jul 2023 06:44 )    Color: x / Appearance: x / SG: x / pH: x  Gluc: 111 mg/dL / Ketone: x  / Bili: x / Urobili: x   Blood: x / Protein: x / Nitrite: x   Leuk Esterase: x / RBC: x / WBC x   Sq Epi: x / Non Sq Epi: x / Bacteria: x        Culture - Blood (collected 07-02-23 @ 11:51)  Source: .Blood Blood-Peripheral  Gram Stain (07-04-23 @ 07:40):    Growth in aerobic bottle: Gram Negative Rods  Final Report (07-05-23 @ 16:11):    Growth in aerobic bottle: Escherichia coli ESBL    ***Blood Panel PCR results on this specimen are available    approximately 3 hours after the Gram stain result.***    Gram stain, PCR, and/or culture results may not always    correspond due to difference in methodologies.    ************************************************************    This PCR assay was performed by multiplex PCR. This    Assay tests for 66 bacterial and resistance gene targets.    Please refer to the [facility] test directory    at [email or web address] for details.  Organism: Blood Culture PCR  Escherichia coli ESBL (07-05-23 @ 16:11)  Organism: Escherichia coli ESBL (07-05-23 @ 16:11)      -  Levofloxacin: S <=0.5      -  Tobramycin: S <=2      -  Aztreonam: R 8      -  Gentamicin: S <=2      -  Cefazolin: R >16 Enterobacter, Klebsiella aerogenes, Citrobacter, and Serratia may develop resistance during prolonged therapy (3-4 days)      -  Cefepime: R >16      -  Piperacillin/Tazobactam: R <=8      -  Ciprofloxacin: I 0.5      -  Imipenem: S <=1      -  Ceftriaxone: R >32 Enterobacter, Klebsiella aerogenes, Citrobacter, and Serratia may develop resistance during prolonged therapy      -  Ampicillin: R >16 These ampicillin results predict results for amoxicillin      Method Type: GÉNESIS      -  Meropenem: S <=1      -  Ampicillin/Sulbactam: R <=4/2 Enterobacter, Klebsiella aerogenes, Citrobacter, and Serratia may develop resistance during prolonged therapy (3-4 days)      -  Amikacin: S <=16      -  Trimethoprim/Sulfamethoxazole: S <=0.5/9.5      -  Ertapenem: S <=0.5  Organism: Blood Culture PCR (07-05-23 @ 16:11)      -  CTX-M Resistance Marker: Detec      -  Escherichia coli: Detec      Method Type: PCR      -  ESBL: Detec    Culture - Blood (collected 07-02-23 @ 11:51)  Source: .Blood Blood-Peripheral  Gram Stain (07-05-23 @ 05:13):    Growth in anaerobic bottle: Gram Negative Rods  Final Report (07-06-23 @ 10:29):    Growth in anaerobic bottle: Escherichia coli ESBL    See previous culture [id number]    Culture - Urine (collected 07-02-23 @ 10:13)  Source: Clean Catch Clean Catch (Midstream)  Final Report (07-06-23 @ 10:26):    >100,000 CFU/ml Escherichia coli ESBL  Organism: Escherichia coli ESBL (07-06-23 @ 10:26)  Organism: Escherichia coli ESBL (07-06-23 @ 10:26)      -  Levofloxacin: S <=0.5      -  Tobramycin: S <=2      -  Nitrofurantoin: S <=32 Should not be used to treat pyelonephritis      -  Aztreonam: R 16      -  Gentamicin: S <=2      -  Cefazolin: R >16 For uncomplicated UTI with K. pneumoniae, E. coli, or P. mirablis: GÉNESIS <=16 is sensitive and GÉNESIS >=32 is resistant. This also predicts results for oral agents cefaclor, cefdinir, cefpodoxime, cefprozil, cefuroxime axetil, cephalexin and locarbef for uncomplicated UTI. Note that some isolates may be susceptible to these agents while testing resistant to cefazolin.      -  Cefepime: R >16      -  Piperacillin/Tazobactam: S <=8      -  Ciprofloxacin: I 0.5      -  Imipenem: S <=1      -  Ceftriaxone: R >32 Enterobacter, Klebsiella aerogenes, Citrobacter, and Serratia may develop resistance during prolonged therapy      -  Ampicillin: R >16 These ampicillin results predict results for amoxicillin      Method Type: GÉNESIS      -  Meropenem: S <=1      -  Ampicillin/Sulbactam: S <=4/2 Enterobacter, Klebsiella aerogenes, Citrobacter, and Serratia may develop resistance during prolonged therapy (3-4 days)      -  Cefuroxime: R >16      -  Amikacin: S <=16      -  Amoxicillin/Clavulanic Acid: S <=8/4      -  Trimethoprim/Sulfamethoxazole: S <=0.5/9.5      -  Ertapenem: S <=0.5        Lactate, Blood: 1.5 mmol/L (07-02-23 @ 09:01)      INFECTIOUS DISEASES TESTING  Hepatitis C Virus Interpretation Result: Nonreact (07-03-23 @ 07:45)      INFLAMMATORY MARKERS      RADIOLOGY & ADDITIONAL TESTS:  I have personally reviewed the last available Chest xray  CXR      CT      CARDIOLOGY TESTING  12 Lead ECG:   Ventricular Rate 102 BPM    Atrial Rate 102 BPM    P-R Interval 134 ms    QRS Duration 84 ms    Q-T Interval 318 ms    QTC Calculation(Bazett) 414 ms    P Axis 49 degrees    R Axis 28 degrees    T Axis 47 degrees    Diagnosis Line Sinus tachycardia  Otherwise normal ECG    Confirmed by Greg Kyle ([id number]) on 7/3/2023 8:40:42 PM (07-03-23 @ 07:45)  12 Lead ECG:   Ventricular Rate 127 BPM    Atrial Rate 127 BPM    P-R Interval 150 ms    QRS Duration 76 ms    Q-T Interval 298 ms    QTC Calculation(Bazett) 433 ms    P Axis 65 degrees    R Axis 41 degrees    T Axis 52 degrees    Diagnosis Line Sinus tachycardia  Otherwise normal ECG    Confirmed by Greg Kyle ([id number]) on 7/3/2023 8:40:15 AM (07-02-23 @ 08:09)      MEDICATIONS  atorvastatin 80 Oral at bedtime  diltiazem    Oral daily  enoxaparin Injectable 40 SubCutaneous every 24 hours  gabapentin 300 Oral two times a day  meropenem  IVPB 1000 IV Intermittent every 8 hours  meropenem  IVPB     metoprolol succinate  Oral daily  pantoprazole    Tablet 40 Oral before breakfast      WEIGHT  Weight (kg): 83.915 (07-02-23 @ 13:28)  Creatinine: 0.8 mg/dL (07-06-23 @ 06:44)      ANTIBIOTICS:  meropenem  IVPB 1000 milliGRAM(s) IV Intermittent every 8 hours  meropenem  IVPB          All available historical records have been reviewed

## 2023-07-06 NOTE — DISCHARGE NOTE PROVIDER - HOSPITAL COURSE
64 yo man pmhx nephrolithiasis and recurrent UTIs, ?tachyarrhythmia, MDD, presents w/ BL flank pain    Per pt he was in his usual state of health prior to yesterday morning when he began to have slight pain after voiding; sxs progressed to BL flank pain and subjective fevers and weakness. Pt denies hematuria, n/v/d, cp, sob;     ED  /74  RR 18 96% RA T 98.9 (TMax 101.2)  WBC 16.58 AST 57 ALT 60 Trop negative Lac 1.5 Lipase 23  CT abd suggestive of pyelo, no evidence of stones      #pyelonephritis  seen by ID  c/w meropenem; will switch to bactrim    #HTN  c/w metoprolol and cardizem  f/u  EP as outpt    #chronic back pain  #Chronic L Hip pain  outpt f/u    #depression  on  venlafaxine    62 yo man pmhx nephrolithiasis and recurrent UTIs, ?tachyarrhythmia, MDD, presents w/ BL flank pain    Per pt he was in his usual state of health prior to yesterday morning when he began to have slight pain after voiding; sxs progressed to BL flank pain and subjective fevers and weakness. Pt denies hematuria, n/v/d, cp, sob;     ED  /74  RR 18 96% RA T 98.9 (TMax 101.2)  WBC 16.58 AST 57 ALT 60 Trop negative Lac 1.5 Lipase 23  CT abd suggestive of pyelo, no evidence of stones    BCx positive for ESBL, started on roberto and sensitive to bactrim. Will dc with bactrim 1DS BID x10 days. Stable for discharge.

## 2023-07-06 NOTE — DISCHARGE NOTE PROVIDER - NSDCCPCAREPLAN_GEN_ALL_CORE_FT
PRINCIPAL DISCHARGE DIAGNOSIS  Diagnosis: Pyelonephritis  Assessment and Plan of Treatment: You were found to have pyelonephritis which is a bacterial infection of the kidney that usually starts in the bladder, for which you will be prescribed antibiotics. Please take these antibiotics as prescribed.   SEEK IMMEDIATE MEDICAL CARE IF YOU HAVE ANY OF THE FOLLOWING SYMPTOMS: chest pain, shortness of breath, abdominal pain, sweating, vomiting, blood in vomit/bowel movements/urine, dizziness/lightheadedness, weakness or numbness to face/arm/leg, trouble speaking or understanding, facial droop.       PRINCIPAL DISCHARGE DIAGNOSIS  Diagnosis: Pyelonephritis  Assessment and Plan of Treatment: You were found to have pyelonephritis which is a bacterial infection of the kidney that usually starts in the bladder, for which you will be prescribed antibiotics. Please take these antibiotics as prescribed. Please follow up with your urologist to discuss what else can be done to decrease frequency of your urinary infections.  SEEK IMMEDIATE MEDICAL CARE IF YOU HAVE ANY OF THE FOLLOWING SYMPTOMS: chest pain, shortness of breath, abdominal pain, sweating, vomiting, blood in vomit/bowel movements/urine, dizziness/lightheadedness, weakness or numbness to face/arm/leg, trouble speaking or understanding, facial droop.

## 2023-07-06 NOTE — PROGRESS NOTE ADULT - ATTENDING COMMENTS
I have personally seen and examined this patient.  I have fully participated in the care of this patient.  I have reviewed all pertinent clinical information, including history, physical exam, plan and note.  I have reviewed all pertinent clinical information and reviewed all relevant imaging and diagnostic studies personally.  My addendum includes the final recommendations and supersedes the resident's note.
62 yo man pmhx nephrolithiasis and recurrent UTIs    #pyelonephritis  seen by ID  c/w meropenem; f/u sensitivities    #HTN  c/w metoprolol and cardizem  f/u  EP as outpt    #chronic back pain  #Chronic L Hip pain  outpt f/u    #depression  on  venlafaxine     Progress Note Handoff:  pending: UCx  Dispo: Home
64 yo man pmhx nephrolithiasis and recurrent UTIs    #pyelonephritis  seen by ID  c/w meropenem; f/u sensitivities    #HTN  c/w metoprolol and cardizem  f/u  EP as outpt    #chronic back pain  #Chronic L Hip pain  outpt f/u    #depression  on  venlafaxine     Progress Note Handoff:  pending: UCx  Dispo: Home

## 2023-07-06 NOTE — PHARMACOTHERAPY INTERVENTION NOTE - COMMENTS
Recommended de-escalating from meropenem to Bactrim PO DS BID as per ID consult recommendations.    Brad Yañez, PharmD  Clinical Pharmacy Specialist, Infectious Diseases  Tele-Antimicrobial Stewardship Program (Tele-ASP)  Tele-ASP Phone: [phone]

## 2023-07-06 NOTE — DISCHARGE NOTE NURSING/CASE MANAGEMENT/SOCIAL WORK - NSDCPEFALRISK_GEN_ALL_CORE
For information on Fall & Injury Prevention, visit: [email or web address] OR  [email or web address] OR  https://www.cdc.gov/steadi/patient.html

## 2023-07-07 LAB
CULTURE RESULTS: NO GROWTH — SIGNIFICANT CHANGE UP
CULTURE RESULTS: SIGNIFICANT CHANGE UP
GRAM STN FLD: SIGNIFICANT CHANGE UP
SPECIMEN SOURCE: SIGNIFICANT CHANGE UP

## 2023-07-08 LAB — CULTURE RESULTS: SIGNIFICANT CHANGE UP

## 2023-07-11 ENCOUNTER — OUTPATIENT (OUTPATIENT)
Dept: OUTPATIENT SERVICES | Facility: HOSPITAL | Age: 63
LOS: 1 days | End: 2023-07-11
Payer: MEDICARE

## 2023-07-11 VITALS
WEIGHT: 195.11 LBS | SYSTOLIC BLOOD PRESSURE: 126 MMHG | TEMPERATURE: 98 F | DIASTOLIC BLOOD PRESSURE: 70 MMHG | OXYGEN SATURATION: 95 % | HEART RATE: 94 BPM | RESPIRATION RATE: 16 BRPM | HEIGHT: 69 IN

## 2023-07-11 DIAGNOSIS — Z01.818 ENCOUNTER FOR OTHER PREPROCEDURAL EXAMINATION: ICD-10-CM

## 2023-07-11 DIAGNOSIS — M16.12 UNILATERAL PRIMARY OSTEOARTHRITIS, LEFT HIP: ICD-10-CM

## 2023-07-11 DIAGNOSIS — Z98.890 OTHER SPECIFIED POSTPROCEDURAL STATES: Chronic | ICD-10-CM

## 2023-07-11 DIAGNOSIS — Z87.19 PERSONAL HISTORY OF OTHER DISEASES OF THE DIGESTIVE SYSTEM: Chronic | ICD-10-CM

## 2023-07-11 LAB
BASOPHILS # BLD AUTO: 0.03 K/UL — SIGNIFICANT CHANGE UP (ref 0–0.2)
BASOPHILS NFR BLD AUTO: 0.6 % — SIGNIFICANT CHANGE UP (ref 0–1)
BLD GP AB SCN SERPL QL: SIGNIFICANT CHANGE UP
CULTURE RESULTS: SIGNIFICANT CHANGE UP
EOSINOPHIL # BLD AUTO: 0.14 K/UL — SIGNIFICANT CHANGE UP (ref 0–0.7)
EOSINOPHIL NFR BLD AUTO: 2.9 % — SIGNIFICANT CHANGE UP (ref 0–8)
HCT VFR BLD CALC: 38.5 % — LOW (ref 42–52)
HGB BLD-MCNC: 13.2 G/DL — LOW (ref 14–18)
IMM GRANULOCYTES NFR BLD AUTO: 0.2 % — SIGNIFICANT CHANGE UP (ref 0.1–0.3)
INR BLD: 1.03 RATIO — SIGNIFICANT CHANGE UP (ref 0.65–1.3)
LYMPHOCYTES # BLD AUTO: 1.58 K/UL — SIGNIFICANT CHANGE UP (ref 1.2–3.4)
LYMPHOCYTES # BLD AUTO: 32.9 % — SIGNIFICANT CHANGE UP (ref 20.5–51.1)
MCHC RBC-ENTMCNC: 31.7 PG — HIGH (ref 27–31)
MCHC RBC-ENTMCNC: 34.3 G/DL — SIGNIFICANT CHANGE UP (ref 32–37)
MCV RBC AUTO: 92.5 FL — SIGNIFICANT CHANGE UP (ref 80–94)
MONOCYTES # BLD AUTO: 0.39 K/UL — SIGNIFICANT CHANGE UP (ref 0.1–0.6)
MONOCYTES NFR BLD AUTO: 8.1 % — SIGNIFICANT CHANGE UP (ref 1.7–9.3)
MRSA PCR RESULT.: NEGATIVE — SIGNIFICANT CHANGE UP
NEUTROPHILS # BLD AUTO: 2.65 K/UL — SIGNIFICANT CHANGE UP (ref 1.4–6.5)
NEUTROPHILS NFR BLD AUTO: 55.3 % — SIGNIFICANT CHANGE UP (ref 42.2–75.2)
NRBC # BLD: 0 /100 WBCS — SIGNIFICANT CHANGE UP (ref 0–0)
PLATELET # BLD AUTO: 538 K/UL — HIGH (ref 130–400)
PMV BLD: 9.3 FL — SIGNIFICANT CHANGE UP (ref 7.4–10.4)
PROTHROM AB SERPL-ACNC: 11.7 SEC — SIGNIFICANT CHANGE UP (ref 9.95–12.87)
RBC # BLD: 4.16 M/UL — LOW (ref 4.7–6.1)
RBC # FLD: 13.5 % — SIGNIFICANT CHANGE UP (ref 11.5–14.5)
SPECIMEN SOURCE: SIGNIFICANT CHANGE UP
WBC # BLD: 4.8 K/UL — SIGNIFICANT CHANGE UP (ref 4.8–10.8)
WBC # FLD AUTO: 4.8 K/UL — SIGNIFICANT CHANGE UP (ref 4.8–10.8)

## 2023-07-11 PROCEDURE — 86901 BLOOD TYPING SEROLOGIC RH(D): CPT

## 2023-07-11 PROCEDURE — 85610 PROTHROMBIN TIME: CPT

## 2023-07-11 PROCEDURE — 87641 MR-STAPH DNA AMP PROBE: CPT

## 2023-07-11 PROCEDURE — 71046 X-RAY EXAM CHEST 2 VIEWS: CPT | Mod: 26

## 2023-07-11 PROCEDURE — 85025 COMPLETE CBC W/AUTO DIFF WBC: CPT

## 2023-07-11 PROCEDURE — 36415 COLL VENOUS BLD VENIPUNCTURE: CPT

## 2023-07-11 PROCEDURE — 71046 X-RAY EXAM CHEST 2 VIEWS: CPT

## 2023-07-11 PROCEDURE — 73502 X-RAY EXAM HIP UNI 2-3 VIEWS: CPT | Mod: 26,LT

## 2023-07-11 PROCEDURE — 87640 STAPH A DNA AMP PROBE: CPT

## 2023-07-11 PROCEDURE — 99214 OFFICE O/P EST MOD 30 MIN: CPT | Mod: 25

## 2023-07-11 PROCEDURE — 73502 X-RAY EXAM HIP UNI 2-3 VIEWS: CPT | Mod: LT

## 2023-07-11 PROCEDURE — 86850 RBC ANTIBODY SCREEN: CPT

## 2023-07-11 PROCEDURE — 86900 BLOOD TYPING SEROLOGIC ABO: CPT

## 2023-07-11 NOTE — H&P PST ADULT - NSICDXPASTMEDICALHX_GEN_ALL_CORE_FT
PAST MEDICAL HISTORY:  Backache     Depression PT DENIES SUICIDAL IDEATION    E-coli UTI     History of ESBL E. coli infection     HTN (hypertension)     Hypercholesterolemia     Kidney stones     OA (osteoarthritis)      PAST MEDICAL HISTORY:  Anxiety     Backache     Cardiac arrhythmia     Depression PT DENIES SUICIDAL IDEATION    E-coli UTI     GERD (gastroesophageal reflux disease)     History of ESBL E. coli infection     HTN (hypertension)     Hypercholesterolemia     Kidney stones     OA (osteoarthritis)

## 2023-07-11 NOTE — H&P PST ADULT - HISTORY OF PRESENT ILLNESS
Left Total Hip Replacement. Denies COVID S/S. Recd 3 doses vaccine. Verbalized understanding of COVID prevention measures. Exercise meaghan 1 flat block LTD by pain LT HIP.  Recnetly d/cd from [facility] -7/4? admitted for nephrolithiasis, Acute UTI -ESBL ECOLI and positive blood cultures Ecoli- on PO ABX - will complete 7/14.  Reports complete resolution of symptoms- denies c/o CP, dysuria, fever, back pain or URI symptoms.   Anesthesia Alert  NO--Difficult Airway  NO--History of neck surgery or radiation  NO--Limited ROM of neck  NO--History of Malignant hyperthermia  NO--Personal or family history of Pseudocholinesterase deficiency  NO--Prior Anesthesia Complication  NO--Latex Allergy  NO--Loose teeth  NO--History of Rheumatoid Arthritis  NO--STEFANY  No Bleeding risk  NO--Other_____  63yr old male states pain LT hip for many yrs -progressively worsened -interferes with walking and ADLs - poor response to meds - presents to [facility] in prep for Left Total Hip Replacement. Denies COVID S/S. Recd 3 doses vaccine. Verbalized understanding of COVID prevention measures. Exercise meaghan 1 flat block LTD by pain LT HIP.  Recently d/cd from [facility] N -7/4? admitted for nephrolithiasis, Acute UTI -ESBL ECOLI and positive blood cultures Ecoli- on PO ABX - will complete 7/14.  Reports complete resolution of symptoms- denies c/o CP, dysuria, fever, back pain or URI symptoms. h/o depression -denies suicidal ideas.  Anesthesia Alert  NO--Difficult Airway  NO--History of neck surgery or radiation  NO--Limited ROM of neck  NO--History of Malignant hyperthermia  NO--Personal or family history of Pseudocholinesterase deficiency  NO--Prior Anesthesia Complication  NO--Latex Allergy  NO--Loose teeth  NO--History of Rheumatoid Arthritis  NO--STEFANY  No Bleeding risk  NO--Other_____

## 2023-07-12 DIAGNOSIS — M16.12 UNILATERAL PRIMARY OSTEOARTHRITIS, LEFT HIP: ICD-10-CM

## 2023-07-12 DIAGNOSIS — Z01.818 ENCOUNTER FOR OTHER PREPROCEDURAL EXAMINATION: ICD-10-CM

## 2023-07-13 ENCOUNTER — APPOINTMENT (OUTPATIENT)
Dept: PAIN MANAGEMENT | Facility: CLINIC | Age: 63
End: 2023-07-13
Payer: MEDICARE

## 2023-07-13 DIAGNOSIS — M70.72 OTHER BURSITIS OF HIP, LEFT HIP: ICD-10-CM

## 2023-07-13 PROCEDURE — 99213 OFFICE O/P EST LOW 20 MIN: CPT

## 2023-07-13 NOTE — HISTORY OF PRESENT ILLNESS
[FreeTextEntry1] : 62 year old patient presents with dull, achy left knee pain worse with walking and better with sitting but worse with sitting for long periods of time. He has had this for more than 6 months and has done more than 10 weeks of PT which does help mildly. He occasionally tries NSAIDs and tylenol which does not help him. He is in severe pain in his left knee which impedes his ADLs and everyday activities. He also complains of burning, tingling sharp pain in b/l lower extremities when he lays down and is intermittent in nature. It improves when he is sitting and standing. He does have some left buttock tingling, pain and low back pain associated with it. \par \par 7/13/23: Today this patient is status post left-sided trochanteric bursa injection on 6/30/23 with 50% of relief. The patient is having left greater trochanteric bursa pain to palpation with pressing on it as well as lying on the left side that sometimes radiates down the lateral thigh.  The pain is about a 4 out of 10 at its worst which is occurring daily.  The patient is having a left hip replacement in 2 weeks.\par \par Pt denies bowel/bladder incontinence, weakness, falls, unsteadiness.\par

## 2023-07-13 NOTE — DISCUSSION/SUMMARY
[de-identified] : A discussion regarding available pain management treatment options occurred with the patient.  These included interventional, rehabilitative, pharmacological, and alternative modalities. We will proceed with the following:\par \par Rehabilitative options:\par - continue with active HEP.\par \par This patient has a scheduled left hip replacement July 28\par \par follow up post surgery \par \par I, Landy Simons, attest that this documentation has been prepared under the direction and in the presence of Provider Vincent Reis DO\par The documentation recorded by the scribe, in my presence, accurately reflects the service I personally performed, and the decisions made by me with my edits as appropriate.\par \par Best Regards, \par Vincent Reis D.O.\par \par

## 2023-07-13 NOTE — PHYSICAL EXAM
[de-identified] : \par left hip\par Pain with IR of left hip into the groin and restricted AROM\par Tenderness to palpation of the left greater trochanteric bursa\par

## 2023-07-18 DIAGNOSIS — N12 TUBULO-INTERSTITIAL NEPHRITIS, NOT SPECIFIED AS ACUTE OR CHRONIC: ICD-10-CM

## 2023-07-18 DIAGNOSIS — E78.00 PURE HYPERCHOLESTEROLEMIA, UNSPECIFIED: ICD-10-CM

## 2023-07-18 DIAGNOSIS — A41.51 SEPSIS DUE TO ESCHERICHIA COLI [E. COLI]: ICD-10-CM

## 2023-07-18 DIAGNOSIS — I10 ESSENTIAL (PRIMARY) HYPERTENSION: ICD-10-CM

## 2023-07-18 DIAGNOSIS — M25.552 PAIN IN LEFT HIP: ICD-10-CM

## 2023-07-18 DIAGNOSIS — K21.9 GASTRO-ESOPHAGEAL REFLUX DISEASE WITHOUT ESOPHAGITIS: ICD-10-CM

## 2023-07-18 DIAGNOSIS — Z16.12 EXTENDED SPECTRUM BETA LACTAMASE (ESBL) RESISTANCE: ICD-10-CM

## 2023-07-18 DIAGNOSIS — F32.9 MAJOR DEPRESSIVE DISORDER, SINGLE EPISODE, UNSPECIFIED: ICD-10-CM

## 2023-07-18 DIAGNOSIS — M54.9 DORSALGIA, UNSPECIFIED: ICD-10-CM

## 2023-07-18 DIAGNOSIS — G89.29 OTHER CHRONIC PAIN: ICD-10-CM

## 2023-07-21 ENCOUNTER — OUTPATIENT (OUTPATIENT)
Dept: OUTPATIENT SERVICES | Facility: HOSPITAL | Age: 63
LOS: 1 days | End: 2023-07-21
Payer: MEDICARE

## 2023-07-21 ENCOUNTER — RESULT REVIEW (OUTPATIENT)
Age: 63
End: 2023-07-21

## 2023-07-21 DIAGNOSIS — N28.1 CYST OF KIDNEY, ACQUIRED: ICD-10-CM

## 2023-07-21 DIAGNOSIS — Z98.890 OTHER SPECIFIED POSTPROCEDURAL STATES: Chronic | ICD-10-CM

## 2023-07-21 DIAGNOSIS — R10.9 UNSPECIFIED ABDOMINAL PAIN: ICD-10-CM

## 2023-07-21 DIAGNOSIS — Z87.19 PERSONAL HISTORY OF OTHER DISEASES OF THE DIGESTIVE SYSTEM: Chronic | ICD-10-CM

## 2023-07-21 PROCEDURE — 74183 MRI ABD W/O CNTR FLWD CNTR: CPT

## 2023-07-21 PROCEDURE — 74183 MRI ABD W/O CNTR FLWD CNTR: CPT | Mod: 26

## 2023-07-21 PROCEDURE — A9579: CPT

## 2023-07-22 DIAGNOSIS — R10.9 UNSPECIFIED ABDOMINAL PAIN: ICD-10-CM

## 2023-07-22 DIAGNOSIS — N28.1 CYST OF KIDNEY, ACQUIRED: ICD-10-CM

## 2023-07-24 ENCOUNTER — NON-APPOINTMENT (OUTPATIENT)
Age: 63
End: 2023-07-24

## 2023-07-25 ENCOUNTER — APPOINTMENT (OUTPATIENT)
Dept: UROLOGY | Facility: CLINIC | Age: 63
End: 2023-07-25
Payer: MEDICARE

## 2023-07-25 VITALS
HEIGHT: 70 IN | TEMPERATURE: 98.4 F | WEIGHT: 195 LBS | BODY MASS INDEX: 27.92 KG/M2 | OXYGEN SATURATION: 98 % | DIASTOLIC BLOOD PRESSURE: 69 MMHG | SYSTOLIC BLOOD PRESSURE: 122 MMHG | RESPIRATION RATE: 18 BRPM | HEART RATE: 101 BPM

## 2023-07-25 DIAGNOSIS — N39.0 URINARY TRACT INFECTION, SITE NOT SPECIFIED: ICD-10-CM

## 2023-07-25 DIAGNOSIS — R39.15 URGENCY OF URINATION: ICD-10-CM

## 2023-07-25 PROBLEM — F41.9 ANXIETY DISORDER, UNSPECIFIED: Chronic | Status: ACTIVE | Noted: 2023-07-11

## 2023-07-25 PROBLEM — K21.9 GASTRO-ESOPHAGEAL REFLUX DISEASE WITHOUT ESOPHAGITIS: Chronic | Status: ACTIVE | Noted: 2023-07-11

## 2023-07-25 PROBLEM — N20.0 CALCULUS OF KIDNEY: Chronic | Status: ACTIVE | Noted: 2023-07-11

## 2023-07-25 PROBLEM — I49.9 CARDIAC ARRHYTHMIA, UNSPECIFIED: Chronic | Status: ACTIVE | Noted: 2023-07-11

## 2023-07-25 PROCEDURE — 99214 OFFICE O/P EST MOD 30 MIN: CPT

## 2023-07-25 RX ORDER — SULFAMETHOXAZOLE AND TRIMETHOPRIM 800; 160 MG/1; MG/1
800-160 TABLET ORAL
Qty: 10 | Refills: 0 | Status: ACTIVE | COMMUNITY
Start: 2023-07-25 | End: 1900-01-01

## 2023-07-25 NOTE — HISTORY OF PRESENT ILLNESS
[FreeTextEntry1] : ANGLE KING is a 62 year old male who presents for consultation for renal colic, CT found to have Lt ureteral stone, passed per pt, was asymptomatic with ultrasound confirming passage.  He has history of recurrent UTI and elevated PSA velocity.\par \par On 7/2/2023 presented to the emergency room with fever and flank pain.  Imaging demonstrated fullness of the left renal pelvis with haziness likely secondary to a sending urinary tract infection.  Urine culture and blood culture demonstrated E. coli.  He was treated with meropenem and Bactrim on discharge for 14 days.  He was discharged with tamsulosin p.o. daily.\par \par Reports he is doing well currently denies any bothersome lower urinary tract symptoms or signs/symptoms of urinary tract infection.\par \par Tolerating tamsulosin well.\par \par CT of the abdomen pelvis with IV contrast from 7/2/2023, demonstrates left renal multifocal cortical areas of hypoenhancement and peripheral stranding suspicious for acute pyelonephritis.  No evidence of radiopaque obstructing stone.  Images visualized and on my read I agree there are no stones in the ureter\par \par MRI of the abdomen with and without IV contrast, from 7/24/2023, demonstrates left renal cyst measures 2.9 x 2.9 cm, Bosniak 2.  No internal septations or solid components.  Mural calcifications are better seen on prior CT.\par \par Labs from 7/11/2023\par CBC demonstrates anemia\par BMP demonstrates a creatinine of 0.8 with an EGFR of 99 improved from on admission creatinine of 1.3 with an EGFR of 61\par Urine culture and blood culture E. coli\par \par \par Previously:\par At his last visit he was found to have significant PSA velocity, his PSA was 1.76, from 4/27/2023, and increased from 0.87 in March 2022\par \par He had dysuria and urine testing demonstrated E. coli.  He was treated with Augmentin with resolution of his symptoms.\par \par Renal/bladder ultrasound, from 5/22/2023:  Demonstrates unremarkable examination of right kidney.  There is a peripherally calcified 3 mm midpole left renal cyst, stable in size.  Diffuse mildly thickened bladder wall a 7 mm, previously 5 mm, which is a new finding. symmetric appearing Postvoid 97 cc with a prostate volume of 29 cc.\par Imaging reviewed with patient which demonstrates perhaps a thick septation, perhaps solid component of this left cyst.  Prior CT imaging was reviewed which demonstrate area of calcifications\par \par Previously:\par PSA, from 4/27/2023 is 1.76 with an 8% free fraction elevated from prior value of 0.87 in March 2022.\par \par CT abdomen pelvis images from May 2022\par  no hydroperinephrosis , bilaterally no kidney stones or urt \par left renal cyst 3 cm with peripheral calcification , seen previously unchanged \par Hounsfield units 19\par perivesical stranding \par \par UA \par UA- 5/22 \par wBS , no nitrate ,\par UC - no growth\par \par RBUS 3/2022 images agree with findings and the cyst has in fact been present since at least 2009\par 1.  No hydronephrosis. Interval resolution of mild left hydronephrosis seen on 12/30/2021 CT scan.\par 2.  Left renal cyst with peripheral calcification, stable compared to CT.\par 3.  Mildly enlarged prostate gland without evidence of urinary retention. Normal \par \par PSA 0.87 3/2022\par \par  PMH: Previously one episode of renal stone, prior pt of dr regalado no gu procedures\par Family History: No  malignancies\par Social History: on disability \par \par CT scan images from 12/2021: Mild Lt hydroureteronephrosis to the level of 3 mm proximal ureteral stone, additional 1 mm stone proximal to the initial stone. [id number]. No stones in the kidneys BL. \par 8 mm pulmonary nodule. WHich he has been following for 20 years (no change). \par \par Cr=1.2 \par UA: negative \par Ca= 9.5 // \par \par \par

## 2023-07-25 NOTE — ASSESSMENT
[FreeTextEntry1] : ANGLE KING is a 62 year old male who presents for consultation for renal colic, CT found to have Lt ureteral stone, passed per pt, was asymptomatic with ultrasound confirming passage.  He has history of recurrent UTI and elevated PSA velocity.  Multiple bouts of E. coli resulting in pyelonephritis sepsis.  Renal cyst Bosniak 2 with no enhancement.  \par \par Urination improved on tamsulosin.\par \par Plan:\par -Continue tamsulosin p.o. daily\par -Start methenamine twice daily for urinary tract infection prophylaxis.  All usage, dosage, mechanism of action, adverse events were reviewed\par -Follow-up 6 months PSA testing\par \par

## 2023-07-28 ENCOUNTER — RESULT REVIEW (OUTPATIENT)
Age: 63
End: 2023-07-28

## 2023-07-28 ENCOUNTER — INPATIENT (INPATIENT)
Facility: HOSPITAL | Age: 63
LOS: 0 days | Discharge: HOME HEALTH | DRG: 470 | End: 2023-07-29
Attending: ORTHOPAEDIC SURGERY | Admitting: ORTHOPAEDIC SURGERY
Payer: MEDICARE

## 2023-07-28 ENCOUNTER — APPOINTMENT (OUTPATIENT)
Dept: ORTHOPEDIC SURGERY | Facility: HOSPITAL | Age: 63
End: 2023-07-28

## 2023-07-28 ENCOUNTER — TRANSCRIPTION ENCOUNTER (OUTPATIENT)
Age: 63
End: 2023-07-28

## 2023-07-28 VITALS
RESPIRATION RATE: 17 BRPM | DIASTOLIC BLOOD PRESSURE: 78 MMHG | WEIGHT: 184.97 LBS | OXYGEN SATURATION: 98 % | SYSTOLIC BLOOD PRESSURE: 130 MMHG | HEART RATE: 80 BPM | HEIGHT: 69 IN | TEMPERATURE: 96 F

## 2023-07-28 DIAGNOSIS — Z98.890 OTHER SPECIFIED POSTPROCEDURAL STATES: Chronic | ICD-10-CM

## 2023-07-28 DIAGNOSIS — Z87.19 PERSONAL HISTORY OF OTHER DISEASES OF THE DIGESTIVE SYSTEM: Chronic | ICD-10-CM

## 2023-07-28 DIAGNOSIS — M16.12 UNILATERAL PRIMARY OSTEOARTHRITIS, LEFT HIP: ICD-10-CM

## 2023-07-28 LAB — GLUCOSE BLDC GLUCOMTR-MCNC: 91 MG/DL — SIGNIFICANT CHANGE UP (ref 70–99)

## 2023-07-28 PROCEDURE — 27130 TOTAL HIP ARTHROPLASTY: CPT | Mod: LT

## 2023-07-28 PROCEDURE — 97116 GAIT TRAINING THERAPY: CPT | Mod: GP

## 2023-07-28 PROCEDURE — 88311 DECALCIFY TISSUE: CPT | Mod: 26

## 2023-07-28 PROCEDURE — 85027 COMPLETE CBC AUTOMATED: CPT

## 2023-07-28 PROCEDURE — 97110 THERAPEUTIC EXERCISES: CPT | Mod: GP

## 2023-07-28 PROCEDURE — 97165 OT EVAL LOW COMPLEX 30 MIN: CPT | Mod: GO

## 2023-07-28 PROCEDURE — 88305 TISSUE EXAM BY PATHOLOGIST: CPT | Mod: 26

## 2023-07-28 PROCEDURE — 80048 BASIC METABOLIC PNL TOTAL CA: CPT

## 2023-07-28 PROCEDURE — 36415 COLL VENOUS BLD VENIPUNCTURE: CPT

## 2023-07-28 RX ORDER — ONDANSETRON 8 MG/1
4 TABLET, FILM COATED ORAL EVERY 6 HOURS
Refills: 0 | Status: DISCONTINUED | OUTPATIENT
Start: 2023-07-28 | End: 2023-07-29

## 2023-07-28 RX ORDER — ASPIRIN/CALCIUM CARB/MAGNESIUM 324 MG
81 TABLET ORAL EVERY 12 HOURS
Refills: 0 | Status: DISCONTINUED | OUTPATIENT
Start: 2023-07-28 | End: 2023-07-29

## 2023-07-28 RX ORDER — GABAPENTIN 400 MG/1
1 CAPSULE ORAL
Qty: 0 | Refills: 0 | DISCHARGE

## 2023-07-28 RX ORDER — SODIUM CHLORIDE 9 MG/ML
1000 INJECTION, SOLUTION INTRAVENOUS
Refills: 0 | Status: DISCONTINUED | OUTPATIENT
Start: 2023-07-28 | End: 2023-07-28

## 2023-07-28 RX ORDER — VENLAFAXINE HCL 75 MG
1 CAPSULE, EXT RELEASE 24 HR ORAL
Qty: 0 | Refills: 0 | DISCHARGE

## 2023-07-28 RX ORDER — TRAMADOL HYDROCHLORIDE 50 MG/1
1 TABLET ORAL
Qty: 42 | Refills: 0
Start: 2023-07-28 | End: 2023-08-03

## 2023-07-28 RX ORDER — ZOLPIDEM TARTRATE 10 MG/1
5 TABLET ORAL AT BEDTIME
Refills: 0 | Status: DISCONTINUED | OUTPATIENT
Start: 2023-07-28 | End: 2023-07-29

## 2023-07-28 RX ORDER — NALOXONE HYDROCHLORIDE 4 MG/.1ML
4 SPRAY NASAL
Qty: 1 | Refills: 0
Start: 2023-07-28

## 2023-07-28 RX ORDER — POLYETHYLENE GLYCOL 3350 17 G/17G
17 POWDER, FOR SOLUTION ORAL AT BEDTIME
Refills: 0 | Status: DISCONTINUED | OUTPATIENT
Start: 2023-07-28 | End: 2023-07-29

## 2023-07-28 RX ORDER — VENLAFAXINE HCL 75 MG
37.5 CAPSULE, EXT RELEASE 24 HR ORAL
Refills: 0 | Status: DISCONTINUED | OUTPATIENT
Start: 2023-07-28 | End: 2023-07-29

## 2023-07-28 RX ORDER — CELECOXIB 200 MG/1
1 CAPSULE ORAL
Qty: 14 | Refills: 0
Start: 2023-07-28 | End: 2023-08-03

## 2023-07-28 RX ORDER — PANTOPRAZOLE SODIUM 20 MG/1
40 TABLET, DELAYED RELEASE ORAL
Refills: 0 | Status: DISCONTINUED | OUTPATIENT
Start: 2023-07-28 | End: 2023-07-29

## 2023-07-28 RX ORDER — TAMSULOSIN HYDROCHLORIDE 0.4 MG/1
0.4 CAPSULE ORAL AT BEDTIME
Refills: 0 | Status: DISCONTINUED | OUTPATIENT
Start: 2023-07-28 | End: 2023-07-29

## 2023-07-28 RX ORDER — ACETAMINOPHEN 500 MG
2 TABLET ORAL
Qty: 112 | Refills: 0
Start: 2023-07-28 | End: 2023-08-10

## 2023-07-28 RX ORDER — TRAMADOL HYDROCHLORIDE 50 MG/1
50 TABLET ORAL EVERY 4 HOURS
Refills: 0 | Status: DISCONTINUED | OUTPATIENT
Start: 2023-07-28 | End: 2023-07-29

## 2023-07-28 RX ORDER — DILTIAZEM HCL 120 MG
240 CAPSULE, EXT RELEASE 24 HR ORAL DAILY
Refills: 0 | Status: DISCONTINUED | OUTPATIENT
Start: 2023-07-29 | End: 2023-07-29

## 2023-07-28 RX ORDER — ACETAMINOPHEN 500 MG
1000 TABLET ORAL ONCE
Refills: 0 | Status: COMPLETED | OUTPATIENT
Start: 2023-07-28 | End: 2023-07-28

## 2023-07-28 RX ORDER — HYDROMORPHONE HYDROCHLORIDE 2 MG/ML
0.5 INJECTION INTRAMUSCULAR; INTRAVENOUS; SUBCUTANEOUS
Refills: 0 | Status: DISCONTINUED | OUTPATIENT
Start: 2023-07-28 | End: 2023-07-28

## 2023-07-28 RX ORDER — DEXAMETHASONE 0.5 MG/5ML
2 ELIXIR ORAL ONCE
Refills: 0 | Status: DISCONTINUED | OUTPATIENT
Start: 2023-07-29 | End: 2023-07-29

## 2023-07-28 RX ORDER — SENNA PLUS 8.6 MG/1
2 TABLET ORAL AT BEDTIME
Refills: 0 | Status: DISCONTINUED | OUTPATIENT
Start: 2023-07-28 | End: 2023-07-29

## 2023-07-28 RX ORDER — OXYCODONE AND ACETAMINOPHEN 5; 325 MG/1; MG/1
2 TABLET ORAL ONCE
Refills: 0 | Status: DISCONTINUED | OUTPATIENT
Start: 2023-07-28 | End: 2023-07-29

## 2023-07-28 RX ORDER — METOPROLOL TARTRATE 50 MG
100 TABLET ORAL DAILY
Refills: 0 | Status: DISCONTINUED | OUTPATIENT
Start: 2023-07-28 | End: 2023-07-29

## 2023-07-28 RX ORDER — SENNA PLUS 8.6 MG/1
2 TABLET ORAL
Qty: 28 | Refills: 0
Start: 2023-07-28 | End: 2023-08-10

## 2023-07-28 RX ORDER — DILTIAZEM HCL 120 MG
1 CAPSULE, EXT RELEASE 24 HR ORAL
Qty: 0 | Refills: 0 | DISCHARGE

## 2023-07-28 RX ORDER — DEXLANSOPRAZOLE 30 MG/1
1 CAPSULE, DELAYED RELEASE ORAL
Refills: 0 | DISCHARGE

## 2023-07-28 RX ORDER — MELOXICAM 15 MG/1
1 TABLET ORAL
Refills: 0 | DISCHARGE

## 2023-07-28 RX ORDER — ASPIRIN/CALCIUM CARB/MAGNESIUM 324 MG
1 TABLET ORAL
Qty: 60 | Refills: 0
Start: 2023-07-28 | End: 2023-08-26

## 2023-07-28 RX ORDER — CHLORHEXIDINE GLUCONATE 213 G/1000ML
1 SOLUTION TOPICAL DAILY
Refills: 0 | Status: DISCONTINUED | OUTPATIENT
Start: 2023-07-28 | End: 2023-07-29

## 2023-07-28 RX ORDER — CELECOXIB 200 MG/1
200 CAPSULE ORAL EVERY 12 HOURS
Refills: 0 | Status: DISCONTINUED | OUTPATIENT
Start: 2023-07-29 | End: 2023-07-29

## 2023-07-28 RX ORDER — ZOLPIDEM TARTRATE 10 MG/1
1 TABLET ORAL
Qty: 0 | Refills: 0 | DISCHARGE

## 2023-07-28 RX ORDER — CEFAZOLIN SODIUM 1 G
2000 VIAL (EA) INJECTION EVERY 8 HOURS
Refills: 0 | Status: COMPLETED | OUTPATIENT
Start: 2023-07-28 | End: 2023-07-28

## 2023-07-28 RX ORDER — SODIUM CHLORIDE 9 MG/ML
1000 INJECTION, SOLUTION INTRAVENOUS
Refills: 0 | Status: DISCONTINUED | OUTPATIENT
Start: 2023-07-28 | End: 2023-07-29

## 2023-07-28 RX ORDER — ATORVASTATIN CALCIUM 80 MG/1
20 TABLET, FILM COATED ORAL AT BEDTIME
Refills: 0 | Status: DISCONTINUED | OUTPATIENT
Start: 2023-07-28 | End: 2023-07-29

## 2023-07-28 RX ORDER — CELECOXIB 200 MG/1
400 CAPSULE ORAL ONCE
Refills: 0 | Status: COMPLETED | OUTPATIENT
Start: 2023-07-28 | End: 2023-07-28

## 2023-07-28 RX ORDER — ACETAMINOPHEN 500 MG
650 TABLET ORAL EVERY 6 HOURS
Refills: 0 | Status: DISCONTINUED | OUTPATIENT
Start: 2023-07-28 | End: 2023-07-29

## 2023-07-28 RX ORDER — ONDANSETRON 8 MG/1
4 TABLET, FILM COATED ORAL ONCE
Refills: 0 | Status: DISCONTINUED | OUTPATIENT
Start: 2023-07-28 | End: 2023-07-29

## 2023-07-28 RX ORDER — HYDROMORPHONE HYDROCHLORIDE 2 MG/ML
0.5 INJECTION INTRAMUSCULAR; INTRAVENOUS; SUBCUTANEOUS
Refills: 0 | Status: DISCONTINUED | OUTPATIENT
Start: 2023-07-28 | End: 2023-07-29

## 2023-07-28 RX ORDER — METOPROLOL TARTRATE 50 MG
1 TABLET ORAL
Refills: 0 | DISCHARGE

## 2023-07-28 RX ORDER — SODIUM CHLORIDE 9 MG/ML
1000 INJECTION INTRAMUSCULAR; INTRAVENOUS; SUBCUTANEOUS
Refills: 0 | Status: DISCONTINUED | OUTPATIENT
Start: 2023-07-28 | End: 2023-07-29

## 2023-07-28 RX ORDER — MAGNESIUM HYDROXIDE 400 MG/1
30 TABLET, CHEWABLE ORAL DAILY
Refills: 0 | Status: DISCONTINUED | OUTPATIENT
Start: 2023-07-28 | End: 2023-07-29

## 2023-07-28 RX ORDER — ROSUVASTATIN CALCIUM 5 MG/1
1 TABLET ORAL
Qty: 0 | Refills: 0 | DISCHARGE

## 2023-07-28 RX ORDER — ONDANSETRON 8 MG/1
4 TABLET, FILM COATED ORAL ONCE
Refills: 0 | Status: DISCONTINUED | OUTPATIENT
Start: 2023-07-28 | End: 2023-07-28

## 2023-07-28 RX ADMIN — SODIUM CHLORIDE 75 MILLILITER(S): 9 INJECTION INTRAMUSCULAR; INTRAVENOUS; SUBCUTANEOUS at 13:45

## 2023-07-28 RX ADMIN — Medication 650 MILLIGRAM(S): at 14:53

## 2023-07-28 RX ADMIN — Medication 1000 MILLIGRAM(S): at 06:26

## 2023-07-28 RX ADMIN — POLYETHYLENE GLYCOL 3350 17 GRAM(S): 17 POWDER, FOR SOLUTION ORAL at 21:38

## 2023-07-28 RX ADMIN — HYDROMORPHONE HYDROCHLORIDE 0.5 MILLIGRAM(S): 2 INJECTION INTRAMUSCULAR; INTRAVENOUS; SUBCUTANEOUS at 10:20

## 2023-07-28 RX ADMIN — HYDROMORPHONE HYDROCHLORIDE 0.5 MILLIGRAM(S): 2 INJECTION INTRAMUSCULAR; INTRAVENOUS; SUBCUTANEOUS at 10:06

## 2023-07-28 RX ADMIN — TAMSULOSIN HYDROCHLORIDE 0.4 MILLIGRAM(S): 0.4 CAPSULE ORAL at 21:38

## 2023-07-28 RX ADMIN — HYDROMORPHONE HYDROCHLORIDE 0.5 MILLIGRAM(S): 2 INJECTION INTRAMUSCULAR; INTRAVENOUS; SUBCUTANEOUS at 10:37

## 2023-07-28 RX ADMIN — Medication 100 MILLIGRAM(S): at 21:39

## 2023-07-28 RX ADMIN — HYDROMORPHONE HYDROCHLORIDE 0.5 MILLIGRAM(S): 2 INJECTION INTRAMUSCULAR; INTRAVENOUS; SUBCUTANEOUS at 12:00

## 2023-07-28 RX ADMIN — Medication 1000 MILLIGRAM(S): at 07:03

## 2023-07-28 RX ADMIN — SODIUM CHLORIDE 100 MILLILITER(S): 9 INJECTION, SOLUTION INTRAVENOUS at 10:37

## 2023-07-28 RX ADMIN — Medication 650 MILLIGRAM(S): at 13:44

## 2023-07-28 RX ADMIN — Medication 100 MILLIGRAM(S): at 13:45

## 2023-07-28 RX ADMIN — SENNA PLUS 2 TABLET(S): 8.6 TABLET ORAL at 21:38

## 2023-07-28 RX ADMIN — ATORVASTATIN CALCIUM 20 MILLIGRAM(S): 80 TABLET, FILM COATED ORAL at 21:38

## 2023-07-28 RX ADMIN — Medication 37.5 MILLIGRAM(S): at 17:50

## 2023-07-28 RX ADMIN — ONDANSETRON 4 MILLIGRAM(S): 8 TABLET, FILM COATED ORAL at 15:13

## 2023-07-28 RX ADMIN — Medication 1 TABLET(S): at 17:48

## 2023-07-28 RX ADMIN — Medication 81 MILLIGRAM(S): at 17:47

## 2023-07-28 RX ADMIN — CELECOXIB 400 MILLIGRAM(S): 200 CAPSULE ORAL at 07:03

## 2023-07-28 RX ADMIN — CELECOXIB 400 MILLIGRAM(S): 200 CAPSULE ORAL at 06:26

## 2023-07-28 NOTE — DISCHARGE NOTE PROVIDER - CARE PROVIDER_API CALL
Jeffrey Rivera  Orthopaedic Surgery  [address] [place] [address]  Phone: [phone]  Fax: [phone]  Follow Up Time:

## 2023-07-28 NOTE — DISCHARGE NOTE PROVIDER - NSDCFUSCHEDAPPT_GEN_ALL_CORE_FT
[facility]ician [facility]  [address]  Scheduled Appointment: 08/18/2023     No

## 2023-07-28 NOTE — DISCHARGE NOTE PROVIDER - NSDCCPCAREPLAN_GEN_ALL_CORE_FT
PRINCIPAL DISCHARGE DIAGNOSIS  Diagnosis: Status post total hip replacement, left  Assessment and Plan of Treatment:

## 2023-07-28 NOTE — BRIEF OPERATIVE NOTE - COMMENTS
depuy implants 54 pinnacle gription cup, 36 neutral liner, size 6 HI actis femoral stem, 36 +5 femoral head

## 2023-07-28 NOTE — DISCHARGE NOTE PROVIDER - NSDCMRMEDTOKEN_GEN_ALL_CORE_FT
acetaminophen 325 mg oral tablet: 2 tab(s) orally every 6 hours MDD: 8  aspirin 81 mg oral delayed release tablet: 1 tab(s) orally every 12 hours MDD: 2  Bactrim  mg-160 mg oral tablet: 1 tab(s) orally 2 times a day  celecoxib 200 mg oral capsule: 1 cap(s) orally every 12 hours MDD: 2  Dexilant 60 mg oral delayed release capsule: 1 orally once a day  dilTIAZem 240 mg/24 hours oral capsule, extended release: 1 cap(s) orally once a day  Flomax 0.4 mg oral capsule: 1 cap(s) orally once a day (at bedtime)  gabapentin 300 mg oral capsule: 1 cap(s) orally 2 times a day  LORazepam 2 mg oral tablet: 1 tab(s) orally 3 times a day, As Needed  meloxicam 15 mg oral tablet: 1 orally once a day  metoprolol succinate 100 mg oral capsule, extended release: 1 orally once a day  naloxone 4 mg/0.1 mL nasal spray: 4 milligram(s) intranasally  rosuvastatin 20 mg oral tablet: 1 tab(s) orally once a day  senna leaf extract oral tablet: 2 tab(s) orally once a day (at bedtime) MDD: 2  traMADol 50 mg oral tablet: 1 tab(s) orally every 4 hours as needed for Mild Pain (1 - 3) MDD: 6  venlafaxine 37.5 mg oral tablet: 1 tab(s) orally 2 times a day  zolpidem 10 mg oral tablet: 1 tab(s) orally once a day (at bedtime)

## 2023-07-28 NOTE — DISCHARGE NOTE PROVIDER - HOSPITAL COURSE
63y Male underwent a left kaitlyn on 7/28/2023. Patient tolerated surgery well with no intra/post operative complications. Patient was given intra/post operative antibiotics for infection prophylaxis. Patient will be discharged on aspirin 81mg BID x30 days to prevent blood clots. Patient worked with Physical Therapy while admitted to the hospital. Patient is stable for discharge.

## 2023-07-28 NOTE — OCCUPATIONAL THERAPY INITIAL EVALUATION ADULT - SPECIFY REASON(S)
Chart reviewed. Attempted to see pt for bedside OT IE. Pt stated that he was dizzy and nauseous; DOMINIC Lipscomb made aware. Hold OT IE at this time. OT to f/u in the am; DOMINIC Lipscomb aware.

## 2023-07-28 NOTE — PHYSICAL THERAPY INITIAL EVALUATION ADULT - GAIT DEVIATIONS NOTED, PT EVAL
decreased maggie/increased time in double stance/decreased step length/decreased stride length/decreased weight-shifting ability

## 2023-07-28 NOTE — PHYSICAL THERAPY INITIAL EVALUATION ADULT - GENERAL OBSERVATIONS, REHAB EVAL
14:30-14:55 Chart reviewed. Pt is okay to be seen for skilled PT, confirmed with DOMINIC Morales. Pt encountered semi blancas in bed, reports 4/10 pain, and is agreeable for PT, +RUE IV(disconnected before session). 14:30-14:55 Chart reviewed. Pt is okay to be seen for skilled PT, confirmed with RN Carmen. Pt encountered semi blancas in bed, reports 4/10 pain, and is agreeable for PT, +RUE IV(disconnected before session)/BLE compression socks/sequential/L hip aquacel

## 2023-07-28 NOTE — PHYSICAL THERAPY INITIAL EVALUATION ADULT - ADDITIONAL COMMENTS
pt is 63 y.o. M, who lives in  with wife and daughter. There are about 15 HOOD with BHR but cannot reach both handrails at once. 2+8+2 stairs to bedroom with L HR. Pt was driving. Information obtained by the pt himself.

## 2023-07-28 NOTE — ASU PATIENT PROFILE, ADULT - NSICDXPASTMEDICALHX_GEN_ALL_CORE_FT
PAST MEDICAL HISTORY:  Anxiety     Backache     Cardiac arrhythmia     Depression PT DENIES SUICIDAL IDEATION    E-coli UTI     GERD (gastroesophageal reflux disease)     History of ESBL E. coli infection     HTN (hypertension)     Hypercholesterolemia     Kidney stones     OA (osteoarthritis)

## 2023-07-28 NOTE — ASU PATIENT PROFILE, ADULT - PATIENT KNOW
From: Alfredo Valentine  To: Marlin Ortiz NP  Sent: 2/26/2019 9:15 AM CST  Subject: Other    Good morning,    I am in need of a TB test. I am not quite sure how to go about scheduling one, so I am reaching out hoping that someone can provide some direction. 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 Are you able to put an order in for me to get a tb test?    If so, can you do so? Thank you in advance.     Alfredo Valentine
Replied to patient via [employer] message.
yes

## 2023-07-28 NOTE — PHYSICAL THERAPY INITIAL EVALUATION ADULT - LEVEL OF INDEPENDENCE: SUPINE/SIT, REHAB EVAL
Detail Level: Simple Assessment (Free Text): Due to possible spread of COVID-19, this visit was conducted in real-time with an audio and video platform, Doxy. me The face-to-face time with the patient was 15 minutes. Recommendation Preamble: Assessment: contact guard

## 2023-07-28 NOTE — DISCHARGE NOTE PROVIDER - NSDCFUADDINST_GEN_ALL_CORE_FT
Please continue Physical therapy at home, weight bearing as tolerated.   Continue Aspirin 81mg twice a day x30 days to prevent blood clots.   Continue protonix 40mg once daily x30 days for GI prophylaxis.   Continue Bactrim for a total of 3 days post operatively; end date 7/30  Keep post-op dressing on for 1 week, showering with dressing on is allowed begining on post op day 2; dressing is water resistant. If dressing falls off before 7 days that is OK. Make sure to keep incision site dry after showering. Do not apply any creams or lotions to incision site.   If any purulent/excessive drainage please contact your surgeon.   Follow up with   in 3 weeks.

## 2023-07-28 NOTE — PATIENT PROFILE ADULT - FALL HARM RISK - HARM RISK INTERVENTIONS
Assistance with ambulation/Assistance OOB with selected safe patient handling equipment/Communicate Risk of Fall with Harm to all staff/Discuss with provider need for PT consult/Monitor gait and stability/Provide patient with walking aids - walker, cane, crutches/Reinforce activity limits and safety measures with patient and family/Sit up slowly, dangle for a short time, stand at bedside before walking/Tailored Fall Risk Interventions/Use of alarms - bed, chair and/or voice tab/Visual Cue: Yellow wristband and red socks/Bed in lowest position, wheels locked, appropriate side rails in place/Call bell, personal items and telephone in reach/Instruct patient to call for assistance before getting out of bed or chair/Non-slip footwear when patient is out of bed/[place] to call system/Physically safe environment - no spills, clutter or unnecessary equipment/Purposeful Proactive Rounding/Room/bathroom lighting operational, light cord in reach

## 2023-07-29 ENCOUNTER — TRANSCRIPTION ENCOUNTER (OUTPATIENT)
Age: 63
End: 2023-07-29

## 2023-07-29 VITALS
TEMPERATURE: 98 F | SYSTOLIC BLOOD PRESSURE: 96 MMHG | DIASTOLIC BLOOD PRESSURE: 54 MMHG | RESPIRATION RATE: 18 BRPM | OXYGEN SATURATION: 96 % | HEART RATE: 77 BPM

## 2023-07-29 LAB
ANION GAP SERPL CALC-SCNC: 10 MMOL/L — SIGNIFICANT CHANGE UP (ref 7–14)
BUN SERPL-MCNC: 17 MG/DL — SIGNIFICANT CHANGE UP (ref 10–20)
CALCIUM SERPL-MCNC: 8.6 MG/DL — SIGNIFICANT CHANGE UP (ref 8.4–10.5)
CHLORIDE SERPL-SCNC: 107 MMOL/L — SIGNIFICANT CHANGE UP (ref 98–110)
CO2 SERPL-SCNC: 23 MMOL/L — SIGNIFICANT CHANGE UP (ref 17–32)
CREAT SERPL-MCNC: 0.9 MG/DL — SIGNIFICANT CHANGE UP (ref 0.7–1.5)
EGFR: 95 ML/MIN/1.73M2 — SIGNIFICANT CHANGE UP
GLUCOSE SERPL-MCNC: 112 MG/DL — HIGH (ref 70–99)
HCT VFR BLD CALC: 33.7 % — LOW (ref 42–52)
HGB BLD-MCNC: 11.3 G/DL — LOW (ref 14–18)
MCHC RBC-ENTMCNC: 31.6 PG — HIGH (ref 27–31)
MCHC RBC-ENTMCNC: 33.5 G/DL — SIGNIFICANT CHANGE UP (ref 32–37)
MCV RBC AUTO: 94.1 FL — HIGH (ref 80–94)
NRBC # BLD: 0 /100 WBCS — SIGNIFICANT CHANGE UP (ref 0–0)
PLATELET # BLD AUTO: 211 K/UL — SIGNIFICANT CHANGE UP (ref 130–400)
PMV BLD: 10.2 FL — SIGNIFICANT CHANGE UP (ref 7.4–10.4)
POTASSIUM SERPL-MCNC: 4.3 MMOL/L — SIGNIFICANT CHANGE UP (ref 3.5–5)
POTASSIUM SERPL-SCNC: 4.3 MMOL/L — SIGNIFICANT CHANGE UP (ref 3.5–5)
RBC # BLD: 3.58 M/UL — LOW (ref 4.7–6.1)
RBC # FLD: 13 % — SIGNIFICANT CHANGE UP (ref 11.5–14.5)
SODIUM SERPL-SCNC: 140 MMOL/L — SIGNIFICANT CHANGE UP (ref 135–146)
WBC # BLD: 8.18 K/UL — SIGNIFICANT CHANGE UP (ref 4.8–10.8)
WBC # FLD AUTO: 8.18 K/UL — SIGNIFICANT CHANGE UP (ref 4.8–10.8)

## 2023-07-29 PROCEDURE — 99221 1ST HOSP IP/OBS SF/LOW 40: CPT

## 2023-07-29 RX ORDER — SENNA PLUS 8.6 MG/1
2 TABLET ORAL
Qty: 28 | Refills: 0
Start: 2023-07-29 | End: 2023-08-11

## 2023-07-29 RX ORDER — SENNA PLUS 8.6 MG/1
2 TABLET ORAL
Qty: 0 | Refills: 0 | DISCHARGE
Start: 2023-07-29

## 2023-07-29 RX ORDER — ASPIRIN/CALCIUM CARB/MAGNESIUM 324 MG
1 TABLET ORAL
Qty: 60 | Refills: 0
Start: 2023-07-29 | End: 2023-08-27

## 2023-07-29 RX ORDER — ASPIRIN/CALCIUM CARB/MAGNESIUM 324 MG
1 TABLET ORAL
Qty: 0 | Refills: 0 | DISCHARGE
Start: 2023-07-29

## 2023-07-29 RX ORDER — ACETAMINOPHEN 500 MG
2 TABLET ORAL
Qty: 112 | Refills: 0
Start: 2023-07-29 | End: 2023-08-11

## 2023-07-29 RX ORDER — TRAMADOL HYDROCHLORIDE 50 MG/1
1 TABLET ORAL
Qty: 0 | Refills: 0 | DISCHARGE
Start: 2023-07-29

## 2023-07-29 RX ORDER — CELECOXIB 200 MG/1
1 CAPSULE ORAL
Qty: 14 | Refills: 0
Start: 2023-07-29 | End: 2023-08-04

## 2023-07-29 RX ORDER — CELECOXIB 200 MG/1
1 CAPSULE ORAL
Qty: 0 | Refills: 0 | DISCHARGE
Start: 2023-07-29

## 2023-07-29 RX ORDER — ACETAMINOPHEN 500 MG
2 TABLET ORAL
Qty: 0 | Refills: 0 | DISCHARGE
Start: 2023-07-29

## 2023-07-29 RX ORDER — TRAMADOL HYDROCHLORIDE 50 MG/1
1 TABLET ORAL
Qty: 42 | Refills: 0
Start: 2023-07-29 | End: 2023-08-04

## 2023-07-29 RX ORDER — NALOXONE HYDROCHLORIDE 4 MG/.1ML
4 SPRAY NASAL
Qty: 1 | Refills: 0
Start: 2023-07-29

## 2023-07-29 RX ADMIN — Medication 650 MILLIGRAM(S): at 12:24

## 2023-07-29 RX ADMIN — PANTOPRAZOLE SODIUM 40 MILLIGRAM(S): 20 TABLET, DELAYED RELEASE ORAL at 05:08

## 2023-07-29 RX ADMIN — Medication 37.5 MILLIGRAM(S): at 08:24

## 2023-07-29 RX ADMIN — Medication 1 TABLET(S): at 05:08

## 2023-07-29 RX ADMIN — CELECOXIB 200 MILLIGRAM(S): 200 CAPSULE ORAL at 12:24

## 2023-07-29 RX ADMIN — CELECOXIB 200 MILLIGRAM(S): 200 CAPSULE ORAL at 11:09

## 2023-07-29 RX ADMIN — Medication 100 MILLIGRAM(S): at 05:08

## 2023-07-29 RX ADMIN — Medication 240 MILLIGRAM(S): at 05:08

## 2023-07-29 RX ADMIN — SODIUM CHLORIDE 75 MILLILITER(S): 9 INJECTION INTRAMUSCULAR; INTRAVENOUS; SUBCUTANEOUS at 04:58

## 2023-07-29 RX ADMIN — Medication 81 MILLIGRAM(S): at 05:08

## 2023-07-29 RX ADMIN — CHLORHEXIDINE GLUCONATE 1 APPLICATION(S): 213 SOLUTION TOPICAL at 11:11

## 2023-07-29 RX ADMIN — Medication 650 MILLIGRAM(S): at 11:10

## 2023-07-29 NOTE — OCCUPATIONAL THERAPY INITIAL EVALUATION ADULT - LIVES WITH, PROFILE
wife and daughter in a private home +15 steps to enter with bilateral handrails +2+8+2 steps inside to bedroom with left handrail +walk-in-shower +shower chair/children/spouse

## 2023-07-29 NOTE — OCCUPATIONAL THERAPY INITIAL EVALUATION ADULT - TRANSFER SAFETY CONCERNS NOTED: SHOWER, REHAB EVAL
As discussed with pt, pt to have family member present during shower transfer to increase safety. Pt demonstrated good understanding and in agreement./decreased weight-shifting ability

## 2023-07-29 NOTE — DISCHARGE NOTE NURSING/CASE MANAGEMENT/SOCIAL WORK - PATIENT PORTAL LINK FT
You can access the FollowMyHealth Patient Portal offered by [facility] by registering at the following website: [email or web address]. By joining [facility]’s FollowMyHealth portal, you will also be able to view your health information using other applications (apps) compatible with our system.

## 2023-07-29 NOTE — OCCUPATIONAL THERAPY INITIAL EVALUATION ADULT - GENERAL OBSERVATIONS, REHAB EVAL
8:35-9:00; chart reviewed, ok to treat by Occupational Therapist as confirmed by RN Mandie, Pt received seated in bedside chair +aquacel left hip +RUE heplock in NAD. Pt reported 4/10 left hip pain; RN aware. Pt in agreement with OT IE.

## 2023-07-29 NOTE — OCCUPATIONAL THERAPY INITIAL EVALUATION ADULT - PERSONAL SAFETY AND JUDGMENT, REHAB EVAL
Orthopedics   Donold Salvage Day 79 y o  female MRN: [id number]  Unit/Bed#: -01      Subjective:  79 y  o female post operative day 1 right femoral intramedullary nail  Pt doing well  Pain controlled      Labs:    0  Lab Value Date/Time   HCT 40 0 01/02/2018 0005   HCT 38 0 07/18/2017 1456   HCT 40 8 06/01/2017 1313   HCT 36 8 12/01/2015 0915   HCT 38 6 07/09/2015 1516   HCT 35 1 02/25/2015 1841   HGB 13 0 01/02/2018 0005   HGB 11 9 07/18/2017 1456   HGB 12 7 06/01/2017 1313   HGB 11 7 12/01/2015 0915   HGB 12 0 07/09/2015 1516   HGB 10 5 (L) 02/25/2015 1841   INR 1 04 01/02/2018 0319   INR 1 10 02/20/2015 0618   WBC 15 91 (H) 01/02/2018 0005   WBC 12 83 (H) 07/18/2017 1456   WBC 13 06 (H) 06/01/2017 1313   WBC 10 63 (H) 12/01/2015 0915   WBC 14 31 (H) 07/09/2015 1516   WBC 9 22 02/25/2015 1841   ESR 33 (H) 12/22/2016 1432   CRP 9 5 (H) 12/22/2016 1432       Meds:    Current Facility-Administered Medications:     acetaminophen (TYLENOL) tablet 650 mg, 650 mg, Oral, Q6H PRN, Samantha Peacock MD    acetaminophen (TYLENOL) tablet 650 mg, 650 mg, Oral, Q6H [address], Samantha Peacock MD, 650 mg at 01/03/18 0516    ascorbic acid (VITAMIN C) tablet 500 mg, 500 mg, Oral, Daily, Samantha Peacock MD    aspirin chewable tablet 81 mg, 81 mg, Oral, Daily, Samantha Peacock MD    atorvastatin (LIPITOR) tablet 20 mg, 20 mg, Oral, HS, Samantha Peacock MD, 20 mg at 01/02/18 2127    bisacodyl (DULCOLAX) EC tablet 5 mg, 5 mg, Oral, Once, Samantha Peacock MD    calcium carbonate (OYSTER SHELL,OSCAL) 500 mg tablet 1 tablet, 1 tablet, Oral, BID With Meals, Samantha Peacock MD, 1 tablet at 01/02/18 1735    enoxaparin (LOVENOX) subcutaneous injection 40 mg, 40 mg, Subcutaneous, Daily, Yue Alvarez PA-C    fentanyl citrate (PF) 100 MCG/2ML 25 mcg, 25 mcg, Intravenous, Q1H PRN, Samantha Peacock MD    furosemide (LASIX) tablet 40 mg, 40 mg, Oral, Daily, Samantha Peacock MD, 40 mg at 01/02/18 1829    lactated ringers infusion, 75 mL/hr, Intravenous, Continuous, Mariangel Sosa CRNA, Stopped at 01/02/18 1351    levothyroxine tablet 50 mcg, 50 mcg, Oral, Early Morning, Robert Lee MD, 50 mcg at 01/03/18 0516    metFORMIN (GLUCOPHAGE) tablet 500 mg, 500 mg, Oral, BID With Meals, Robert Lee MD, 500 mg at 01/02/18 1735    metoprolol tartrate (LOPRESSOR) tablet 50 mg, 50 mg, Oral, BID, Robert Lee MD, 50 mg at 01/02/18 1736    multivitamin-minerals (CENTRUM) tablet 1 tablet, 1 tablet, Oral, Daily, Robert Lee MD    oxyCODONE (ROXICODONE) IR tablet 2 5 mg, 2 5 mg, Oral, Q4H PRN, Robert Lee MD    oxyCODONE (ROXICODONE) IR tablet 5 mg, 5 mg, Oral, Q4H PRN, Robert Lee MD, 5 mg at 01/02/18 0748    pantoprazole (PROTONIX) EC tablet 40 mg, 40 mg, Oral, Early Morning, Robert Lee MD, 40 mg at 01/03/18 0516    potassium chloride (K-DUR,KLOR-CON) CR tablet 20 mEq, 20 mEq, Oral, Daily, Robert Lee MD, 20 mEq at 01/02/18 1829    promethazine (PHENERGAN) tablet 25 mg, 25 mg, Oral, Daily, Robert Lee MD    traMADol [employer]) tablet 50 mg, 50 mg, Oral, Q6H PRN, Janet Doss PA-C    Blood Culture:   No results found for: BLOODCX    Wound Culture:   No results found for: WOUNDCULT    Ins and Outs:  I/O last 24 hours: In: 200 [P O :180; I V :550; IV Piggyback:250]  Out: 680 [Urine:580; Blood:100]          Physical exam:  Vitals:    01/03/18 0031   BP: 120/55   Pulse: 79   Resp: 22   Temp: 98 4 °F (36 9 °C)   SpO2: 98%     right lower extremity  · Mild bloody drainage on distal incision  · SILT s/s/sp/dp/t  · Motor intact to ankle df/pf, EHL/FHL  · 2+ dorsalis pedis pulse    _*_*_*_*_*_*_*_*_*_*_*_*_*_*_*_*_*_*_*_*_*_*_*_*_*_*_*_*_*_*_*_*_*_*_*_*_*_*_*_*_*    Assessment: 79 y  o female post operative day 1 right femur IMN   Pt doing well    Plan:  · Up and out of bed  · Weightbearing as tolerated RLE  · PT/OT  · DVT prophylaxis  · Analgesics  · Will continue to assess for acute blood loss anemia    Blanca Montanez MD intact

## 2023-07-29 NOTE — CONSULT NOTE ADULT - SUBJECTIVE AND OBJECTIVE BOX
ANGLE KING is a 63y Male    Patient is a 63y old  Male who presents with a chief complaint of s/p left kaitlyn  (28 Jul 2023 09:57). Patient was having pain for several months and has failed all other conservative measures. He is S/p L Total hip replacement. He admits to some pain at the sight of surgery but is tolerating it well.      PAST MEDICAL  PAST MEDICAL & SURGICAL HISTORY:  OA (osteoarthritis)      HTN (hypertension)      Hypercholesterolemia      Depression  PT DENIES SUICIDAL IDEATION      Backache      Kidney stones      E-coli UTI      History of ESBL E. coli infection      Cardiac arrhythmia      GERD (gastroesophageal reflux disease)      Anxiety      H/O hernia repair      History of open reduction and internal fixation (ORIF) procedure  RIGHT LEG      H/O hemorrhoids        REVIEW OF SYSTEMS:  CONSTITUTIONAL: No fever, weight loss, or fatigue  EYES: No eye pain, visual disturbances, or discharge  ENMT:  No difficulty hearing, tinnitus, vertigo; No sinus or throat pain  NECK: No pain or stiffness  BREASTS: No pain, masses, or nipple discharge  RESPIRATORY: No cough, wheezing, chills or hemoptysis; No shortness of breath  CARDIOVASCULAR: No chest pain, palpitations, dizziness, or leg swelling  GASTROINTESTINAL: No abdominal or epigastric pain. No nausea, vomiting, or hematemesis; No diarrhea or constipation. No melena or hematochezia.  GENITOURINARY: No dysuria, frequency, hematuria, or incontinence  NEUROLOGICAL: No headaches, memory loss, loss of strength, numbness, or tremors  SKIN: No itching, burning, rashes, or lesions   LYMPH NODES: No enlarged glands  ENDOCRINE: No heat or cold intolerance; No hair loss  MUSCULOSKELETAL: No joint pain or swelling; No muscle, back, or extremity pain  PSYCHIATRIC: No depression, anxiety, mood swings, or difficulty sleeping  HEME/LYMPH: No easy bruising, or bleeding gums  ALLERY AND IMMUNOLOGIC: No hives or eczema    T(C): 36.6 (07-29-23 @ 04:06), Max: 36.6 (07-29-23 @ 04:06)  HR: 85 (07-29-23 @ 05:05) (76 - 86)  BP: 117/59 (07-29-23 @ 05:05) (101/55 - 122/67)  RR: 18 (07-29-23 @ 04:06) (18 - 18)  SpO2: 96% (07-29-23 @ 04:06) (95% - 100%)  Wt(kg): --Vital Signs Last 24 Hrs  T(C): 36.6 (29 Jul 2023 04:06), Max: 36.6 (29 Jul 2023 04:06)  T(F): 97.9 (29 Jul 2023 04:06), Max: 97.9 (29 Jul 2023 04:06)  HR: 85 (29 Jul 2023 05:05) (76 - 86)  BP: 117/59 (29 Jul 2023 05:05) (101/55 - 122/67)  BP(mean): --  RR: 18 (29 Jul 2023 04:06) (18 - 18)  SpO2: 96% (29 Jul 2023 04:06) (95% - 100%)    Parameters below as of 29 Jul 2023 04:06  Patient On (Oxygen Delivery Method): room air        PHYSICAL EXAM:  GENERAL: NAD, well-groomed, well-developed  HEAD:  Atraumatic, Normocephalic  EYES: EOMI, PERRLA, conjunctiva and sclera clear  ENMT: No tonsillar erythema, exudates, or enlargement; Moist mucous membranes, Good dentition, No lesions  NECK: Supple, No JVD, Normal thyroid  NERVOUS SYSTEM:  Alert & Oriented X3, Good concentration; Motor Strength 5/5 B/L upper and lower extremities; DTRs 2+ intact and symmetric  CHEST/LUNG: Clear to percussion bilaterally; No rales, rhonchi, wheezing, or rubs  HEART: Regular rate and rhythm; No murmurs, rubs, or gallops  ABDOMEN: Soft, Nontender, Nondistended; Bowel sounds present  EXTREMITIES:  2+ Peripheral Pulses, No clubbing, cyanosis, or edema  LYMPH: No lymphadenopathy noted  SKIN: No rashes or lesions    Consultant(s) Notes Reviewed:  [x ] YES  [ ] NO  Care Discussed with Consultants/Other Providers [ x] YES  [ ] NO    LABS:                        11.3   8.18  )-----------( 211      ( 29 Jul 2023 08:17 )             33.7     07-29    140  |  107  |  17  ----------------------------<  112<H>  4.3   |  23  |  0.9    Ca    8.6      29 Jul 2023 08:17            Urinalysis Basic - ( 29 Jul 2023 08:17 )    Color: x / Appearance: x / SG: x / pH: x  Gluc: 112 mg/dL / Ketone: x  / Bili: x / Urobili: x   Blood: x / Protein: x / Nitrite: x   Leuk Esterase: x / RBC: x / WBC x   Sq Epi: x / Non Sq Epi: x / Bacteria: x        Lactate Trend        CAPILLARY BLOOD GLUCOSE      POCT Blood Glucose.: 91 mg/dL (28 Jul 2023 06:35)      RADIOLOGY & ADDITIONAL TESTS:    Imaging Personally Reviewed:  [ ] YES  [ ] NO    Assessment & Plan    S/p L total hip replacement  -primary management per Ortho  -Pain management  -Physical therapy    HTN  -C/w metoprolol and cardizem    Depression  - c/w venlafaxine     Hx of ESBL UTI  -C/w bactrim  -Currently asymptomatic    Thank you for allowing me to care for Angle Sofía

## 2023-07-29 NOTE — OCCUPATIONAL THERAPY INITIAL EVALUATION ADULT - ADDITIONAL COMMENTS
PLOF obtained from pt. Pt reported that he owns sc, RW, 3:1 commode, shower chair. Pt's family was assisting with don/doff left sock PTA.  (+) driving

## 2023-08-02 DIAGNOSIS — F41.9 ANXIETY DISORDER, UNSPECIFIED: ICD-10-CM

## 2023-08-02 DIAGNOSIS — Z91.018 ALLERGY TO OTHER FOODS: ICD-10-CM

## 2023-08-02 DIAGNOSIS — E78.00 PURE HYPERCHOLESTEROLEMIA, UNSPECIFIED: ICD-10-CM

## 2023-08-02 DIAGNOSIS — K21.9 GASTRO-ESOPHAGEAL REFLUX DISEASE WITHOUT ESOPHAGITIS: ICD-10-CM

## 2023-08-02 DIAGNOSIS — F32.A DEPRESSION, UNSPECIFIED: ICD-10-CM

## 2023-08-02 DIAGNOSIS — M16.12 UNILATERAL PRIMARY OSTEOARTHRITIS, LEFT HIP: ICD-10-CM

## 2023-08-02 DIAGNOSIS — I10 ESSENTIAL (PRIMARY) HYPERTENSION: ICD-10-CM

## 2023-08-03 LAB — SURGICAL PATHOLOGY STUDY: SIGNIFICANT CHANGE UP

## 2023-08-18 ENCOUNTER — APPOINTMENT (OUTPATIENT)
Dept: ORTHOPEDIC SURGERY | Facility: CLINIC | Age: 63
End: 2023-08-18
Payer: MEDICARE

## 2023-08-18 PROBLEM — Z86.19 PERSONAL HISTORY OF OTHER INFECTIOUS AND PARASITIC DISEASES: Chronic | Status: ACTIVE | Noted: 2023-07-11

## 2023-08-18 PROCEDURE — 73521 X-RAY EXAM HIPS BI 2 VIEWS: CPT

## 2023-08-18 PROCEDURE — 99024 POSTOP FOLLOW-UP VISIT: CPT

## 2023-08-18 NOTE — IMAGING
[de-identified] : Left hip exam: Healing incision site, staples removed and Steri-Strips placed.  No sign of infection.  No ecchymosis, no erythema.  No tenderness to palpation.  Good range of motion.  Neurovascularly intact. High Risk (score 12 or above)

## 2023-08-18 NOTE — HISTORY OF PRESENT ILLNESS
[de-identified] : Patient is a 63-year-old male status post left total hip placement 7/28/2023 by Dr. Hubert Stevens.  Patient states he is doing well, no complaints.

## 2023-08-18 NOTE — DISCUSSION/SUMMARY
[de-identified] : X-ray bilateral hip for comparison: Bilateral hip surgical hardware intact in good position.  Discussed with patient that he is doing well.  Advised continuation of aspirin for 1 week for DVT prophylaxis.  Advised continuation of at home physical therapy exercises.  Follow-up in 6 weeks for reevaluation.  Patient understands agrees with plan.

## 2023-09-28 ENCOUNTER — APPOINTMENT (OUTPATIENT)
Dept: ORTHOPEDIC SURGERY | Facility: CLINIC | Age: 63
End: 2023-09-28
Payer: MEDICARE

## 2023-09-28 DIAGNOSIS — M17.10 UNILATERAL PRIMARY OSTEOARTHRITIS, UNSPECIFIED KNEE: ICD-10-CM

## 2023-09-28 DIAGNOSIS — Z96.642 PRESENCE OF LEFT ARTIFICIAL HIP JOINT: ICD-10-CM

## 2023-09-28 PROCEDURE — 99024 POSTOP FOLLOW-UP VISIT: CPT

## 2023-09-28 PROCEDURE — 20610 DRAIN/INJ JOINT/BURSA W/O US: CPT | Mod: 79,RT

## 2023-09-28 PROCEDURE — 73560 X-RAY EXAM OF KNEE 1 OR 2: CPT | Mod: 50

## 2023-10-19 RX ORDER — TAMSULOSIN HYDROCHLORIDE 0.4 MG/1
0.4 CAPSULE ORAL
Qty: 90 | Refills: 3 | Status: ACTIVE | COMMUNITY
Start: 2023-07-25 | End: 1900-01-01

## 2023-10-19 RX ORDER — METHENAMINE HIPPURATE 1 G/1
1 TABLET ORAL
Qty: 180 | Refills: 3 | Status: ACTIVE | COMMUNITY
Start: 2023-07-25 | End: 1900-01-01

## 2024-01-05 ENCOUNTER — NON-APPOINTMENT (OUTPATIENT)
Age: 64
End: 2024-01-05

## 2024-01-05 LAB
PSA FREE FLD-MCNC: 29 %
PSA FREE SERPL-MCNC: 0.22 NG/ML
PSA SERPL-MCNC: 0.75 NG/ML

## 2024-01-08 ENCOUNTER — APPOINTMENT (OUTPATIENT)
Dept: UROLOGY | Facility: CLINIC | Age: 64
End: 2024-01-08
Payer: MEDICARE

## 2024-01-08 VITALS
HEART RATE: 93 BPM | SYSTOLIC BLOOD PRESSURE: 115 MMHG | HEIGHT: 70 IN | BODY MASS INDEX: 26.48 KG/M2 | WEIGHT: 185 LBS | DIASTOLIC BLOOD PRESSURE: 77 MMHG

## 2024-01-08 DIAGNOSIS — N20.0 CALCULUS OF KIDNEY: ICD-10-CM

## 2024-01-08 DIAGNOSIS — R97.20 ELEVATED PROSTATE, SPECIFIC ANTIGEN [PSA]: ICD-10-CM

## 2024-01-08 DIAGNOSIS — N28.1 CYST OF KIDNEY, ACQUIRED: ICD-10-CM

## 2024-01-08 PROCEDURE — 99214 OFFICE O/P EST MOD 30 MIN: CPT

## 2024-01-10 PROBLEM — N28.1 COMPLEX RENAL CYST: Status: ACTIVE | Noted: 2023-06-27

## 2024-01-10 PROBLEM — N20.0 NEPHROLITHIASIS: Status: ACTIVE | Noted: 2022-04-28

## 2024-01-10 PROBLEM — R97.20 ELEVATED PROSTATE SPECIFIC ANTIGEN (PSA): Status: ACTIVE | Noted: 2023-05-09

## 2024-01-10 NOTE — HISTORY OF PRESENT ILLNESS
[FreeTextEntry1] : ANGLE KING is a 63 year old male who presents for consultation for renal colic, CT found to have Lt ureteral stone, passed per pt, was asymptomatic with ultrasound confirming passage. He has history of recurrent UTI and elevated PSA velocity.  Currently managed on tamsulosin 0.4 mg daily and methenamine 1 g twice daily. Reports office today feeling well.  No infections over the last 6 months.  Denies any dysuria or gross hematuria.  Denies any flank pain fevers.  Most recent PSA January 2024 is 0.75 ng/mL.  Prior: CT of the abdomen pelvis with IV contrast from 7/2/2023, demonstrates left renal multifocal cortical areas of hypoenhancement and peripheral stranding suspicious for acute pyelonephritis. No evidence of radiopaque obstructing stone. Images visualized and on my read I agree there are no stones in the ureter  MRI of the abdomen with and without IV contrast, from 7/24/2023, demonstrates left renal cyst measures 2.9 x 2.9 cm, Bosniak 2. No internal septations or solid components. Mural calcifications are better seen on prior CT.  Labs from 7/11/2023  CBC demonstrates anemia  BMP demonstrates a creatinine of 0.8 with an EGFR of 99 improved from on admission creatinine of 1.3 with an EGFR of 61  Urine culture and blood culture E. coli  Previously: Renal/bladder ultrasound, from 5/22/2023: Demonstrates unremarkable examination of right kidney. There is a peripherally calcified 3 mm midpole left renal cyst, stable in size. Diffuse mildly thickened bladder wall a 7 mm, previously 5 mm, which is a new finding. symmetric appearing Postvoid 97 cc with a prostate volume of 29 cc. Imaging reviewed with patient which demonstrates perhaps a thick septation, perhaps solid component of this left cyst. Prior CT imaging was reviewed which demonstrate area of calcifications  Previously: PSA, from 4/27/2023 is 1.76 with an 8% free fraction elevated from prior value of 0.87 in March 2022.  CT abdomen pelvis images from May 2022 no hydroperinephrosis , bilaterally no kidney stones or urt left renal cyst 3 cm with peripheral calcification , seen previously unchanged Hounsfield units 19  perivesical stranding  UA  UA- 5/22  wBS , no nitrate ,  UC - no growth  RBUS 3/2022 images agree with findings and the cyst has in fact been present since at least 2009 1. No hydronephrosis. Interval resolution of mild left hydronephrosis seen on 12/30/2021 CT scan. 2. Left renal cyst with peripheral calcification, stable compared to CT. 3. Mildly enlarged prostate gland without evidence of urinary retention. Normal  PSA 0.87 3/2022   PMH: Previously one episode of renal stone, prior pt of dr regalado no gu procedures Family History: No  malignancies Social History: on disability  CT scan images from 12/2021: Mild Lt hydroureteronephrosis to the level of 3 mm proximal ureteral stone, additional 1 mm stone proximal to the initial stone. [id number]. No stones in the kidneys BL.  8 mm pulmonary nodule. WHich he has been following for 20 years (no change).  Cr=1.2 UA: negative Ca= 9.5 //

## 2024-01-10 NOTE — ASSESSMENT
[FreeTextEntry1] : ANGLE KING is a 63 year old male who presents for consultation for renal colic, CT found to have Lt ureteral stone, passed per pt, was asymptomatic with ultrasound confirming passage. He has history of recurrent UTI and elevated PSA velocity. Multiple bouts of E. coli resulting in pyelonephritis, sepsis. Renal cyst Bosniak 2 with no enhancement.  Doing well on tamsulosin 0.4 mg daily  Continue methenamine 1 g twice daily for chronic recurrent UTI prevention PSA has improved.  Continue medication Follow-up 6 months for uroflow and PVR PSA and RBUS in 1 year reassess  chronic nephrolithiasis

## 2024-02-02 ENCOUNTER — APPOINTMENT (OUTPATIENT)
Dept: ORTHOPEDIC SURGERY | Facility: CLINIC | Age: 64
End: 2024-02-02
Payer: MEDICARE

## 2024-02-02 ENCOUNTER — APPOINTMENT (OUTPATIENT)
Dept: RADIOLOGY | Facility: CLINIC | Age: 64
End: 2024-02-02

## 2024-02-02 ENCOUNTER — APPOINTMENT (OUTPATIENT)
Dept: PAIN MANAGEMENT | Facility: CLINIC | Age: 64
End: 2024-02-02
Payer: MEDICARE

## 2024-02-02 VITALS — HEIGHT: 69 IN | WEIGHT: 185 LBS | BODY MASS INDEX: 27.4 KG/M2

## 2024-02-02 VITALS — BODY MASS INDEX: 26.48 KG/M2 | HEIGHT: 70 IN | WEIGHT: 185 LBS

## 2024-02-02 DIAGNOSIS — Z96.649 PRESENCE OF UNSPECIFIED ARTIFICIAL HIP JOINT: ICD-10-CM

## 2024-02-02 DIAGNOSIS — Z87.39 PERSONAL HISTORY OF OTHER DISEASES OF THE MUSCULOSKELETAL SYSTEM AND CONNECTIVE TISSUE: ICD-10-CM

## 2024-02-02 DIAGNOSIS — K29.60 OTHER GASTRITIS W/OUT BLEEDING: ICD-10-CM

## 2024-02-02 DIAGNOSIS — M25.571 PAIN IN RIGHT ANKLE AND JOINTS OF RIGHT FOOT: ICD-10-CM

## 2024-02-02 DIAGNOSIS — Z86.79 PERSONAL HISTORY OF OTHER DISEASES OF THE CIRCULATORY SYSTEM: ICD-10-CM

## 2024-02-02 DIAGNOSIS — Z86.59 PERSONAL HISTORY OF OTHER MENTAL AND BEHAVIORAL DISORDERS: ICD-10-CM

## 2024-02-02 DIAGNOSIS — M16.12 UNILATERAL PRIMARY OSTEOARTHRITIS, LEFT HIP: ICD-10-CM

## 2024-02-02 DIAGNOSIS — Z96.659 PRESENCE OF UNSPECIFIED ARTIFICIAL KNEE JOINT: ICD-10-CM

## 2024-02-02 DIAGNOSIS — G89.29 PAIN IN RIGHT ANKLE AND JOINTS OF RIGHT FOOT: ICD-10-CM

## 2024-02-02 PROCEDURE — 99214 OFFICE O/P EST MOD 30 MIN: CPT

## 2024-02-02 PROCEDURE — 99213 OFFICE O/P EST LOW 20 MIN: CPT

## 2024-02-02 PROCEDURE — 73610 X-RAY EXAM OF ANKLE: CPT | Mod: RT

## 2024-02-02 PROCEDURE — G2211 COMPLEX E/M VISIT ADD ON: CPT

## 2024-02-02 PROCEDURE — 73630 X-RAY EXAM OF FOOT: CPT | Mod: RT

## 2024-02-02 NOTE — HISTORY OF PRESENT ILLNESS
[de-identified] : 63-year-old patient who comes in today with chronic right foot and ankle pain.  Is been worsening since an injury that he had several years ago.  He was told at that time he would have arthritis and that it would get worse.  He has had injections in the past.  Is really started getting worse recently.  He localized the pain he does have to the subtalar joint.  Denies any pain elsewhere.

## 2024-02-02 NOTE — PHYSICAL EXAM
[de-identified] : He is alert oriented x 3.  Is pleasant cooperative.  I examined his right lower extremity.  The ankle itself is nontender.  He is full range of motion of the ankle.  He is quite tender in subtalar joint.  He has limited hindfoot eversion and inversion.  Compartments are soft compressible.  Neurovascular intact distally.

## 2024-02-02 NOTE — DISCUSSION/SUMMARY
[de-identified] : I discussed the patient's findings with him.  We discussed both nonsurgical and surgical management.  He would like to try another injection before he leaves for vacation but he wants this done closer to when his vacation is.  I will see him back next week for an injection into the subtalar joint.  All questions sought and answered.

## 2024-02-02 NOTE — DATA REVIEWED
[FreeTextEntry1] : 3 views of the patient's right foot with weightbearing were ordered and reviewed by me personally.  I also reviewed the x-rays that were done outside.  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  The ankle joint is well-preserved.  The ankle joint is congruent.

## 2024-02-09 ENCOUNTER — APPOINTMENT (OUTPATIENT)
Dept: ORTHOPEDIC SURGERY | Facility: CLINIC | Age: 64
End: 2024-02-09
Payer: MEDICARE

## 2024-02-09 DIAGNOSIS — M19.071 PRIMARY OSTEOARTHRITIS, RIGHT ANKLE AND FOOT: ICD-10-CM

## 2024-02-09 PROCEDURE — 99213 OFFICE O/P EST LOW 20 MIN: CPT | Mod: 25

## 2024-02-09 PROCEDURE — 20605 DRAIN/INJ JOINT/BURSA W/O US: CPT | Mod: RT

## 2024-02-09 NOTE — HISTORY OF PRESENT ILLNESS
[de-identified] : 63-year-old patient here for injection of his right subtalar joint.  He has been having more pain recently.  He wants to proceed forward with injection.

## 2024-02-09 NOTE — PHYSICAL EXAM
[de-identified] : He is tender over both the ankle and subtalar joint.  There is very limited passive and active range of motion of the subtalar joint.  He is neurovascular intact

## 2024-02-09 NOTE — DISCUSSION/SUMMARY
[de-identified] : Patient like to proceed forward with injection.  Under sterile conditions I injected 2 cc of lidocaine and Kenalog into the subtalar joint at the sinus Tarsi.  Patient tolerated procedure well.  I will see him back as needed.

## 2024-03-12 LAB
ALBUMIN SERPL ELPH-MCNC: 4.9 G/DL
ALP BLD-CCNC: 55 U/L
ALT SERPL-CCNC: 34 U/L
ANION GAP SERPL CALC-SCNC: 15 MMOL/L
AST SERPL-CCNC: 28 U/L
BASOPHILS # BLD AUTO: 0.05 K/UL
BASOPHILS NFR BLD AUTO: 1 %
BILIRUB DIRECT SERPL-MCNC: <0.2 MG/DL
BILIRUB INDIRECT SERPL-MCNC: >0.3 MG/DL
BILIRUB SERPL-MCNC: 0.5 MG/DL
BUN SERPL-MCNC: 20 MG/DL
CALCIUM SERPL-MCNC: 10.1 MG/DL
CHLORIDE SERPL-SCNC: 103 MMOL/L
CO2 SERPL-SCNC: 23 MMOL/L
CREAT SERPL-MCNC: 1 MG/DL
EGFR: 85 ML/MIN/1.73M2
EOSINOPHIL # BLD AUTO: 0.07 K/UL
EOSINOPHIL NFR BLD AUTO: 1.4 %
GLUCOSE SERPL-MCNC: 105 MG/DL
HCT VFR BLD CALC: 42.9 %
HGB BLD-MCNC: 14.5 G/DL
IMM GRANULOCYTES NFR BLD AUTO: 0.2 %
INR PPP: 0.96 RATIO
LYMPHOCYTES # BLD AUTO: 2.01 K/UL
LYMPHOCYTES NFR BLD AUTO: 40.9 %
MAN DIFF?: NORMAL
MCHC RBC-ENTMCNC: 32.5 PG
MCHC RBC-ENTMCNC: 33.8 G/DL
MCV RBC AUTO: 96.2 FL
MONOCYTES # BLD AUTO: 0.57 K/UL
MONOCYTES NFR BLD AUTO: 11.6 %
NEUTROPHILS # BLD AUTO: 2.21 K/UL
NEUTROPHILS NFR BLD AUTO: 44.9 %
PLATELET # BLD AUTO: 324 K/UL
POTASSIUM SERPL-SCNC: 4.1 MMOL/L
PROT SERPL-MCNC: 7.1 G/DL
PT BLD: 10.9 SEC
RBC # BLD: 4.46 M/UL
RBC # FLD: 12.8 %
SODIUM SERPL-SCNC: 141 MMOL/L
WBC # FLD AUTO: 4.92 K/UL

## 2024-05-24 NOTE — PRE-ANESTHESIA EVALUATION ADULT - NSANTHOSAYNRD_GEN_A_CORE
Gerard informed by Phone.   No. STEFANY screening performed.  STOP BANG Legend: 0-2 = LOW Risk; 3-4 = INTERMEDIATE Risk; 5-8 = HIGH Risk

## 2024-07-08 ENCOUNTER — NON-APPOINTMENT (OUTPATIENT)
Age: 64
End: 2024-07-08

## 2024-07-09 ENCOUNTER — APPOINTMENT (OUTPATIENT)
Dept: UROLOGY | Facility: CLINIC | Age: 64
End: 2024-07-09
Payer: MEDICARE

## 2024-07-09 VITALS
DIASTOLIC BLOOD PRESSURE: 87 MMHG | WEIGHT: 195 LBS | HEART RATE: 84 BPM | HEIGHT: 69 IN | RESPIRATION RATE: 18 BRPM | OXYGEN SATURATION: 96 % | BODY MASS INDEX: 28.88 KG/M2 | SYSTOLIC BLOOD PRESSURE: 134 MMHG

## 2024-07-09 DIAGNOSIS — N20.0 CALCULUS OF KIDNEY: ICD-10-CM

## 2024-07-09 DIAGNOSIS — N39.0 URINARY TRACT INFECTION, SITE NOT SPECIFIED: ICD-10-CM

## 2024-07-09 DIAGNOSIS — N13.8 BENIGN PROSTATIC HYPERPLASIA WITH LOWER URINARY TRACT SYMPMS: ICD-10-CM

## 2024-07-09 DIAGNOSIS — N40.1 BENIGN PROSTATIC HYPERPLASIA WITH LOWER URINARY TRACT SYMPMS: ICD-10-CM

## 2024-07-09 DIAGNOSIS — R39.15 URGENCY OF URINATION: ICD-10-CM

## 2024-07-09 DIAGNOSIS — N28.1 CYST OF KIDNEY, ACQUIRED: ICD-10-CM

## 2024-07-09 PROCEDURE — 99214 OFFICE O/P EST MOD 30 MIN: CPT

## 2024-07-09 PROCEDURE — G2211 COMPLEX E/M VISIT ADD ON: CPT

## 2024-07-15 ENCOUNTER — NON-APPOINTMENT (OUTPATIENT)
Age: 64
End: 2024-07-15

## 2024-12-25 PROBLEM — F10.90 ALCOHOL USE: Status: ACTIVE | Noted: 2022-11-04

## 2025-01-03 ENCOUNTER — NON-APPOINTMENT (OUTPATIENT)
Age: 65
End: 2025-01-03

## 2025-01-04 ENCOUNTER — OUTPATIENT (OUTPATIENT)
Dept: OUTPATIENT SERVICES | Facility: HOSPITAL | Age: 65
LOS: 1 days | End: 2025-01-04
Payer: MEDICAID

## 2025-01-04 ENCOUNTER — RESULT REVIEW (OUTPATIENT)
Age: 65
End: 2025-01-04

## 2025-01-04 DIAGNOSIS — R39.15 URGENCY OF URINATION: ICD-10-CM

## 2025-01-04 DIAGNOSIS — Z98.890 OTHER SPECIFIED POSTPROCEDURAL STATES: Chronic | ICD-10-CM

## 2025-01-04 DIAGNOSIS — Z87.19 PERSONAL HISTORY OF OTHER DISEASES OF THE DIGESTIVE SYSTEM: Chronic | ICD-10-CM

## 2025-01-04 PROCEDURE — 76770 US EXAM ABDO BACK WALL COMP: CPT | Mod: 26

## 2025-01-04 PROCEDURE — 76770 US EXAM ABDO BACK WALL COMP: CPT

## 2025-01-05 DIAGNOSIS — R39.15 URGENCY OF URINATION: ICD-10-CM

## 2025-01-07 ENCOUNTER — APPOINTMENT (OUTPATIENT)
Dept: UROLOGY | Facility: CLINIC | Age: 65
End: 2025-01-07
Payer: MEDICARE

## 2025-01-07 VITALS
OXYGEN SATURATION: 96 % | SYSTOLIC BLOOD PRESSURE: 125 MMHG | HEART RATE: 80 BPM | WEIGHT: 185 LBS | RESPIRATION RATE: 18 BRPM | BODY MASS INDEX: 27.4 KG/M2 | DIASTOLIC BLOOD PRESSURE: 85 MMHG | HEIGHT: 69 IN

## 2025-01-07 DIAGNOSIS — N40.1 BENIGN PROSTATIC HYPERPLASIA WITH LOWER URINARY TRACT SYMPMS: ICD-10-CM

## 2025-01-07 DIAGNOSIS — N39.0 URINARY TRACT INFECTION, SITE NOT SPECIFIED: ICD-10-CM

## 2025-01-07 DIAGNOSIS — N13.8 BENIGN PROSTATIC HYPERPLASIA WITH LOWER URINARY TRACT SYMPMS: ICD-10-CM

## 2025-01-07 DIAGNOSIS — N28.1 CYST OF KIDNEY, ACQUIRED: ICD-10-CM

## 2025-01-07 LAB
BILIRUB UR QL STRIP: NORMAL
COLLECTION METHOD: NORMAL
GLUCOSE UR-MCNC: NORMAL
HCG UR QL: 0.2 EU/DL
HGB UR QL STRIP.AUTO: NORMAL
KETONES UR-MCNC: NORMAL
LEUKOCYTE ESTERASE UR QL STRIP: NORMAL
NITRITE UR QL STRIP: NORMAL
PH UR STRIP: 7
PROT UR STRIP-MCNC: NORMAL
SP GR UR STRIP: 1.02

## 2025-01-07 PROCEDURE — 99214 OFFICE O/P EST MOD 30 MIN: CPT

## 2025-01-07 PROCEDURE — 81003 URINALYSIS AUTO W/O SCOPE: CPT | Mod: QW

## 2025-01-07 PROCEDURE — G2211 COMPLEX E/M VISIT ADD ON: CPT

## 2025-01-30 NOTE — ED ADULT TRIAGE NOTE - NS ED TRIAGE AVPU SCALE
Alert-The patient is alert, awake and responds to voice. The patient is oriented to time, place, and person. The triage nurse is able to obtain subjective information. Can take tylenol 650mg every 6hrs as needed for pain.    Stay well hydrated.    If nose begins to bleed:   Lean forward. Apply light but constant pressure to nose for 5 minutes. If still bleeding then apply pressure for 10 minutes. If still bleeding then apply pressure for 15 minutes. IF still bleeding then go to ER. Return sooner for fevers, persistent vomit, uncontrolled pain, worsening breathing, worsening lightheaded, worsening bleeding.    Follow up with primary doctor within 1-2 days.     Follow up with ENT. Can follow up at [facility] ([facility] Can call [phone] to schedule appointment.     Use humidifier at home.   Can gently apply vaseline to nostrils once or twice a day.      Your doctor may need to make medication changes if your blood pressure continues to be high.    Follow up at our infectious disease clinic at:   [facility] at [address], [address] [place] [id number]  Phone: [phone]  Call to schedule appointment.     Hypertension    Hypertension, commonly called high blood pressure, is when the force of blood pumping through your arteries is too strong. Hypertension forces your heart to work harder to pump blood. Your arteries may become narrow or stiff. Having untreated or uncontrolled hypertension for a long period of time can cause heart attack, stroke, kidney disease, and other problems. If started on a medication, take exactly as prescribed by your health care professional. Maintain a healthy lifestyle and follow up with your primary care physician.    SEEK IMMEDIATE MEDICAL CARE IF YOU HAVE ANY OF THE FOLLOWING SYMPTOMS: severe headache, confusion, chest pain, abdominal pain, vomiting, or shortness of breath.     Epistaxis    Epistaxis is the medical term for a nosebleed. Nosebleeds are common and can be caused by many conditions, such as injury, infections, dry environments, medicines, nose picking, and home heating and cooling systems. Try controlling your nosebleed by pinching your nose continuously for at least 10 minutes. Avoid lying down while you are having a nosebleed. Sit up and lean forward. Avoid blowing or sniffing your nose for a number of hours after having a nosebleed. Resume your normal activities as you are able, but avoid straining, lifting, or bending at the waist for several days. Maintain humidity in your home by using less air conditioning or by using a humidifier.     If your nose was packed by your health care provider, keep the packing inside of your nose until a health care provider removes it. If a balloon catheter was used to pack your nose, do not cut or remove it unless your health care provider has instructed you to do that.     Aspirin and blood thinners make bleeding more likely. If you are prescribed these medicines and you suffer from nosebleeds, ask your health care provider if you should stop taking the medicines or adjust the dose. Do not stop medicines unless directed by your health care provider.    SEEK IMMEDIATE MEDICAL CARE IF YOU HAVE ANY OF THE FOLLOWING SYMPTOMS: nosebleed lasting longer than 20 minutes, unusual bleeding from or bruising on other parts of your body, dizziness or lightheadedness, fainting, nosebleed occurring after a head injury, or fever.

## 2025-04-01 ENCOUNTER — APPOINTMENT (OUTPATIENT)
Dept: ORTHOPEDIC SURGERY | Facility: CLINIC | Age: 65
End: 2025-04-01
Payer: MEDICARE

## 2025-04-01 DIAGNOSIS — M16.11 UNILATERAL PRIMARY OSTEOARTHRITIS, RIGHT HIP: ICD-10-CM

## 2025-04-01 DIAGNOSIS — Z96.641 PRESENCE OF RIGHT ARTIFICIAL HIP JOINT: ICD-10-CM

## 2025-04-01 PROCEDURE — 73560 X-RAY EXAM OF KNEE 1 OR 2: CPT | Mod: 50

## 2025-04-01 PROCEDURE — 99213 OFFICE O/P EST LOW 20 MIN: CPT

## 2025-04-01 PROCEDURE — 73522 X-RAY EXAM HIPS BI 3-4 VIEWS: CPT
